# Patient Record
Sex: FEMALE | Race: WHITE | Employment: UNEMPLOYED | URBAN - METROPOLITAN AREA
[De-identification: names, ages, dates, MRNs, and addresses within clinical notes are randomized per-mention and may not be internally consistent; named-entity substitution may affect disease eponyms.]

---

## 2017-01-05 DIAGNOSIS — E11.21 TYPE 2 DIABETES MELLITUS WITH DIABETIC NEPHROPATHY, WITH LONG-TERM CURRENT USE OF INSULIN (HCC): ICD-10-CM

## 2017-01-05 DIAGNOSIS — Z79.4 TYPE 2 DIABETES MELLITUS WITH DIABETIC NEPHROPATHY, WITH LONG-TERM CURRENT USE OF INSULIN (HCC): ICD-10-CM

## 2017-01-05 RX ORDER — INSULIN LISPRO 100 [IU]/ML
INJECTION, SUSPENSION SUBCUTANEOUS
Qty: 15 ML | Refills: 1 | Status: SHIPPED | OUTPATIENT
Start: 2017-01-05 | End: 2017-01-09 | Stop reason: SDUPTHER

## 2017-01-09 DIAGNOSIS — E11.21 TYPE 2 DIABETES MELLITUS WITH DIABETIC NEPHROPATHY, WITH LONG-TERM CURRENT USE OF INSULIN (HCC): ICD-10-CM

## 2017-01-09 DIAGNOSIS — Z79.4 TYPE 2 DIABETES MELLITUS WITH DIABETIC NEPHROPATHY, WITH LONG-TERM CURRENT USE OF INSULIN (HCC): ICD-10-CM

## 2017-01-09 RX ORDER — INSULIN LISPRO 100 [IU]/ML
INJECTION, SUSPENSION SUBCUTANEOUS
Qty: 30 ML | Refills: 0 | Status: SHIPPED | OUTPATIENT
Start: 2017-01-09 | End: 2017-02-28

## 2017-01-09 RX ORDER — INSULIN LISPRO 100 [IU]/ML
INJECTION, SUSPENSION SUBCUTANEOUS
Qty: 10 PEN | Refills: 1 | Status: SHIPPED | OUTPATIENT
Start: 2017-01-09 | End: 2017-01-09

## 2017-02-27 DIAGNOSIS — Z79.4 TYPE 2 DIABETES MELLITUS WITH DIABETIC NEPHROPATHY, WITH LONG-TERM CURRENT USE OF INSULIN (HCC): ICD-10-CM

## 2017-02-27 DIAGNOSIS — E11.21 TYPE 2 DIABETES MELLITUS WITH DIABETIC NEPHROPATHY, WITH LONG-TERM CURRENT USE OF INSULIN (HCC): ICD-10-CM

## 2017-02-28 RX ORDER — INSULIN LISPRO 100 [IU]/ML
INJECTION, SUSPENSION SUBCUTANEOUS
Qty: 15 ML | Refills: 0 | Status: SHIPPED | OUTPATIENT
Start: 2017-02-28 | End: 2017-03-01

## 2017-03-01 ENCOUNTER — OFFICE VISIT (OUTPATIENT)
Dept: FAMILY MEDICINE CLINIC | Age: 77
End: 2017-03-01

## 2017-03-01 VITALS
RESPIRATION RATE: 16 BRPM | WEIGHT: 194 LBS | DIASTOLIC BLOOD PRESSURE: 68 MMHG | HEIGHT: 62 IN | SYSTOLIC BLOOD PRESSURE: 118 MMHG | BODY MASS INDEX: 35.7 KG/M2 | TEMPERATURE: 98.4 F | HEART RATE: 66 BPM

## 2017-03-01 DIAGNOSIS — E11.42 DIABETIC POLYNEUROPATHY ASSOCIATED WITH TYPE 2 DIABETES MELLITUS (HCC): ICD-10-CM

## 2017-03-01 DIAGNOSIS — E11.21 TYPE 2 DIABETES MELLITUS WITH DIABETIC NEPHROPATHY, WITH LONG-TERM CURRENT USE OF INSULIN (HCC): Primary | ICD-10-CM

## 2017-03-01 DIAGNOSIS — M15.9 GENERALIZED OSTEOARTHRITIS OF MULTIPLE SITES: ICD-10-CM

## 2017-03-01 DIAGNOSIS — F32.5 MAJOR DEPRESSIVE DISORDER WITH SINGLE EPISODE, IN FULL REMISSION (HCC): ICD-10-CM

## 2017-03-01 DIAGNOSIS — Z79.4 TYPE 2 DIABETES MELLITUS WITH DIABETIC NEPHROPATHY, WITH LONG-TERM CURRENT USE OF INSULIN (HCC): Primary | ICD-10-CM

## 2017-03-01 DIAGNOSIS — I10 ESSENTIAL HYPERTENSION: ICD-10-CM

## 2017-03-01 DIAGNOSIS — E78.2 MIXED HYPERLIPIDEMIA: ICD-10-CM

## 2017-03-01 LAB — HBA1C MFR BLD: 8.9 %

## 2017-03-01 PROCEDURE — 99214 OFFICE O/P EST MOD 30 MIN: CPT | Performed by: FAMILY MEDICINE

## 2017-03-01 PROCEDURE — 83036 HEMOGLOBIN GLYCOSYLATED A1C: CPT | Performed by: FAMILY MEDICINE

## 2017-03-01 RX ORDER — FLUOXETINE HYDROCHLORIDE 40 MG/1
40 CAPSULE ORAL DAILY
Qty: 90 CAPSULE | Refills: 1 | Status: SHIPPED | OUTPATIENT
Start: 2017-03-01 | End: 2017-05-08

## 2017-03-21 ENCOUNTER — OFFICE VISIT (OUTPATIENT)
Dept: FAMILY MEDICINE CLINIC | Age: 77
End: 2017-03-21

## 2017-03-21 VITALS
SYSTOLIC BLOOD PRESSURE: 118 MMHG | DIASTOLIC BLOOD PRESSURE: 70 MMHG | BODY MASS INDEX: 36.44 KG/M2 | HEART RATE: 68 BPM | WEIGHT: 198 LBS | RESPIRATION RATE: 16 BRPM | TEMPERATURE: 98.4 F | HEIGHT: 62 IN

## 2017-03-21 DIAGNOSIS — H60.542 ACUTE ECZEMATOID OTITIS EXTERNA OF LEFT EAR: Primary | ICD-10-CM

## 2017-03-21 PROCEDURE — 99213 OFFICE O/P EST LOW 20 MIN: CPT | Performed by: FAMILY MEDICINE

## 2017-04-17 ENCOUNTER — OFFICE VISIT (OUTPATIENT)
Dept: FAMILY MEDICINE CLINIC | Age: 77
End: 2017-04-17

## 2017-04-17 VITALS
WEIGHT: 187 LBS | RESPIRATION RATE: 16 BRPM | HEART RATE: 90 BPM | BODY MASS INDEX: 34.41 KG/M2 | OXYGEN SATURATION: 98 % | SYSTOLIC BLOOD PRESSURE: 118 MMHG | HEIGHT: 62 IN | TEMPERATURE: 98.4 F | DIASTOLIC BLOOD PRESSURE: 70 MMHG

## 2017-04-17 DIAGNOSIS — J20.9 ACUTE BRONCHITIS, UNSPECIFIED ORGANISM: Primary | ICD-10-CM

## 2017-04-17 DIAGNOSIS — Z79.4 TYPE 2 DIABETES MELLITUS WITH DIABETIC NEPHROPATHY, WITH LONG-TERM CURRENT USE OF INSULIN (HCC): ICD-10-CM

## 2017-04-17 DIAGNOSIS — E11.21 TYPE 2 DIABETES MELLITUS WITH DIABETIC NEPHROPATHY, WITH LONG-TERM CURRENT USE OF INSULIN (HCC): ICD-10-CM

## 2017-04-17 DIAGNOSIS — H10.31 ACUTE BACTERIAL CONJUNCTIVITIS OF RIGHT EYE: ICD-10-CM

## 2017-04-17 PROCEDURE — 99214 OFFICE O/P EST MOD 30 MIN: CPT | Performed by: FAMILY MEDICINE

## 2017-04-17 RX ORDER — DEXTROMETHORPHAN HYDROBROMIDE AND PROMETHAZINE HYDROCHLORIDE 15; 6.25 MG/5ML; MG/5ML
5 SYRUP ORAL 4 TIMES DAILY PRN
Qty: 240 ML | Refills: 0 | Status: SHIPPED | OUTPATIENT
Start: 2017-04-17 | End: 2017-04-24

## 2017-04-17 RX ORDER — CEFUROXIME AXETIL 250 MG/1
250 TABLET ORAL 2 TIMES DAILY
Qty: 20 TABLET | Refills: 0 | Status: SHIPPED | OUTPATIENT
Start: 2017-04-17 | End: 2017-04-27

## 2017-04-17 RX ORDER — SULFACETAMIDE SODIUM 100 MG/ML
1 SOLUTION/ DROPS OPHTHALMIC 4 TIMES DAILY
Qty: 1 BOTTLE | Refills: 0 | Status: SHIPPED | OUTPATIENT
Start: 2017-04-17 | End: 2017-04-27

## 2017-05-06 DIAGNOSIS — I10 ESSENTIAL HYPERTENSION: ICD-10-CM

## 2017-05-06 DIAGNOSIS — Z79.4 TYPE 2 DIABETES MELLITUS WITH DIABETIC NEPHROPATHY, WITH LONG-TERM CURRENT USE OF INSULIN (HCC): ICD-10-CM

## 2017-05-06 DIAGNOSIS — E11.21 TYPE 2 DIABETES MELLITUS WITH DIABETIC NEPHROPATHY, WITH LONG-TERM CURRENT USE OF INSULIN (HCC): ICD-10-CM

## 2017-05-06 DIAGNOSIS — F32.5 MAJOR DEPRESSIVE DISORDER WITH SINGLE EPISODE, IN FULL REMISSION (HCC): ICD-10-CM

## 2017-05-08 RX ORDER — QUINAPRIL 40 MG/1
TABLET ORAL
Qty: 90 TABLET | Refills: 0 | Status: SHIPPED | OUTPATIENT
Start: 2017-05-08 | End: 2017-07-19 | Stop reason: SDUPTHER

## 2017-05-08 RX ORDER — FLUOXETINE HYDROCHLORIDE 40 MG/1
CAPSULE ORAL
Qty: 90 CAPSULE | Refills: 0 | Status: SHIPPED | OUTPATIENT
Start: 2017-05-08 | End: 2017-07-19 | Stop reason: SDUPTHER

## 2017-07-19 ENCOUNTER — OFFICE VISIT (OUTPATIENT)
Dept: FAMILY MEDICINE CLINIC | Age: 77
End: 2017-07-19

## 2017-07-19 ENCOUNTER — TELEPHONE (OUTPATIENT)
Dept: FAMILY MEDICINE CLINIC | Age: 77
End: 2017-07-19

## 2017-07-19 VITALS
BODY MASS INDEX: 33.66 KG/M2 | RESPIRATION RATE: 16 BRPM | HEIGHT: 63 IN | TEMPERATURE: 98.4 F | SYSTOLIC BLOOD PRESSURE: 130 MMHG | DIASTOLIC BLOOD PRESSURE: 78 MMHG | WEIGHT: 190 LBS | HEART RATE: 60 BPM

## 2017-07-19 DIAGNOSIS — Z86.73 HISTORY OF CVA (CEREBROVASCULAR ACCIDENT): ICD-10-CM

## 2017-07-19 DIAGNOSIS — N81.10 CYSTOCELE WITHOUT UTERINE PROLAPSE: ICD-10-CM

## 2017-07-19 DIAGNOSIS — I10 ESSENTIAL HYPERTENSION: ICD-10-CM

## 2017-07-19 DIAGNOSIS — I67.9 CEREBRAL VASCULAR DISEASE: ICD-10-CM

## 2017-07-19 DIAGNOSIS — E78.2 MIXED HYPERLIPIDEMIA: ICD-10-CM

## 2017-07-19 DIAGNOSIS — K21.9 GASTROESOPHAGEAL REFLUX DISEASE WITHOUT ESOPHAGITIS: ICD-10-CM

## 2017-07-19 DIAGNOSIS — Z79.4 TYPE 2 DIABETES MELLITUS WITH DIABETIC NEPHROPATHY, WITH LONG-TERM CURRENT USE OF INSULIN (HCC): ICD-10-CM

## 2017-07-19 DIAGNOSIS — I65.21 STENOSIS OF RIGHT CAROTID ARTERY: ICD-10-CM

## 2017-07-19 DIAGNOSIS — Z79.4 TYPE 2 DIABETES MELLITUS WITH DIABETIC NEPHROPATHY, WITH LONG-TERM CURRENT USE OF INSULIN (HCC): Primary | ICD-10-CM

## 2017-07-19 DIAGNOSIS — G63 POLYNEUROPATHY ASSOCIATED WITH UNDERLYING DISEASE (HCC): ICD-10-CM

## 2017-07-19 DIAGNOSIS — E11.21 TYPE 2 DIABETES MELLITUS WITH DIABETIC NEPHROPATHY, WITH LONG-TERM CURRENT USE OF INSULIN (HCC): ICD-10-CM

## 2017-07-19 DIAGNOSIS — E11.21 TYPE 2 DIABETES MELLITUS WITH DIABETIC NEPHROPATHY, WITH LONG-TERM CURRENT USE OF INSULIN (HCC): Primary | ICD-10-CM

## 2017-07-19 DIAGNOSIS — F32.5 MAJOR DEPRESSIVE DISORDER WITH SINGLE EPISODE, IN FULL REMISSION (HCC): ICD-10-CM

## 2017-07-19 LAB
A/G RATIO: 1.8 (ref 1.1–2.2)
ALBUMIN SERPL-MCNC: 4.6 G/DL (ref 3.4–5)
ALP BLD-CCNC: 74 U/L (ref 40–129)
ALT SERPL-CCNC: <5 U/L (ref 10–40)
ANION GAP SERPL CALCULATED.3IONS-SCNC: 15 MMOL/L (ref 3–16)
AST SERPL-CCNC: 12 U/L (ref 15–37)
BASOPHILS ABSOLUTE: 0 K/UL (ref 0–0.2)
BASOPHILS RELATIVE PERCENT: 0.7 %
BILIRUB SERPL-MCNC: 0.5 MG/DL (ref 0–1)
BUN BLDV-MCNC: 28 MG/DL (ref 7–20)
CALCIUM SERPL-MCNC: 10.3 MG/DL (ref 8.3–10.6)
CHLORIDE BLD-SCNC: 105 MMOL/L (ref 99–110)
CHOLESTEROL, TOTAL: 157 MG/DL (ref 0–199)
CO2: 26 MMOL/L (ref 21–32)
CREAT SERPL-MCNC: 1.4 MG/DL (ref 0.6–1.2)
EOSINOPHILS ABSOLUTE: 0.2 K/UL (ref 0–0.6)
EOSINOPHILS RELATIVE PERCENT: 3.2 %
GFR AFRICAN AMERICAN: 44
GFR NON-AFRICAN AMERICAN: 36
GLOBULIN: 2.5 G/DL
GLUCOSE BLD-MCNC: 32 MG/DL (ref 70–99)
HCT VFR BLD CALC: 40.1 % (ref 36–48)
HDLC SERPL-MCNC: 76 MG/DL (ref 40–60)
HEMOGLOBIN: 13.2 G/DL (ref 12–16)
LDL CHOLESTEROL CALCULATED: 61 MG/DL
LYMPHOCYTES ABSOLUTE: 2.2 K/UL (ref 1–5.1)
LYMPHOCYTES RELATIVE PERCENT: 35 %
MCH RBC QN AUTO: 30.7 PG (ref 26–34)
MCHC RBC AUTO-ENTMCNC: 32.9 G/DL (ref 31–36)
MCV RBC AUTO: 93.4 FL (ref 80–100)
MONOCYTES ABSOLUTE: 0.6 K/UL (ref 0–1.3)
MONOCYTES RELATIVE PERCENT: 9.5 %
NEUTROPHILS ABSOLUTE: 3.3 K/UL (ref 1.7–7.7)
NEUTROPHILS RELATIVE PERCENT: 51.6 %
PDW BLD-RTO: 15.3 % (ref 12.4–15.4)
PLATELET # BLD: 232 K/UL (ref 135–450)
PMV BLD AUTO: 10.2 FL (ref 5–10.5)
POTASSIUM SERPL-SCNC: 4.8 MMOL/L (ref 3.5–5.1)
RBC # BLD: 4.3 M/UL (ref 4–5.2)
SODIUM BLD-SCNC: 146 MMOL/L (ref 136–145)
TOTAL PROTEIN: 7.1 G/DL (ref 6.4–8.2)
TRIGL SERPL-MCNC: 99 MG/DL (ref 0–150)
VLDLC SERPL CALC-MCNC: 20 MG/DL
WBC # BLD: 6.3 K/UL (ref 4–11)

## 2017-07-19 PROCEDURE — 99214 OFFICE O/P EST MOD 30 MIN: CPT | Performed by: FAMILY MEDICINE

## 2017-07-19 PROCEDURE — 4010F ACE/ARB THERAPY RXD/TAKEN: CPT | Performed by: FAMILY MEDICINE

## 2017-07-19 RX ORDER — PANTOPRAZOLE SODIUM 40 MG/1
40 TABLET, DELAYED RELEASE ORAL DAILY
Qty: 90 TABLET | Refills: 3 | Status: SHIPPED | OUTPATIENT
Start: 2017-07-19 | End: 2017-10-19 | Stop reason: SDUPTHER

## 2017-07-19 RX ORDER — CARVEDILOL 25 MG/1
25 TABLET ORAL 2 TIMES DAILY WITH MEALS
Qty: 180 TABLET | Refills: 1 | Status: SHIPPED | OUTPATIENT
Start: 2017-07-19 | End: 2017-10-19 | Stop reason: SDUPTHER

## 2017-07-19 RX ORDER — INSULIN ASPART 100 [IU]/ML
40 INJECTION, SUSPENSION SUBCUTANEOUS 2 TIMES DAILY WITH MEALS
Qty: 30 ML | Refills: 3 | Status: SHIPPED | OUTPATIENT
Start: 2017-07-19 | End: 2017-09-12 | Stop reason: SDUPTHER

## 2017-07-19 RX ORDER — AMLODIPINE BESYLATE 5 MG/1
5 TABLET ORAL DAILY
Qty: 90 TABLET | Refills: 1 | Status: SHIPPED | OUTPATIENT
Start: 2017-07-19 | End: 2017-10-19 | Stop reason: SDUPTHER

## 2017-07-19 RX ORDER — HYDROCHLOROTHIAZIDE 25 MG/1
25 TABLET ORAL DAILY
Qty: 90 TABLET | Refills: 1 | Status: SHIPPED | OUTPATIENT
Start: 2017-07-19 | End: 2017-10-19 | Stop reason: SDUPTHER

## 2017-07-19 RX ORDER — FLUOXETINE HYDROCHLORIDE 40 MG/1
CAPSULE ORAL
Qty: 90 CAPSULE | Refills: 0 | Status: SHIPPED | OUTPATIENT
Start: 2017-07-19 | End: 2017-08-08 | Stop reason: SDUPTHER

## 2017-07-19 RX ORDER — BLOOD SUGAR DIAGNOSTIC
1 STRIP MISCELLANEOUS 2 TIMES DAILY
Qty: 200 EACH | Refills: 3 | Status: SHIPPED | OUTPATIENT
Start: 2017-07-19 | End: 2022-05-12

## 2017-07-19 RX ORDER — QUINAPRIL 40 MG/1
TABLET ORAL
Qty: 90 TABLET | Refills: 0 | Status: SHIPPED | OUTPATIENT
Start: 2017-07-19 | End: 2017-10-19 | Stop reason: SDUPTHER

## 2017-07-19 RX ORDER — ATORVASTATIN CALCIUM 40 MG/1
40 TABLET, FILM COATED ORAL DAILY
Qty: 90 TABLET | Refills: 1 | Status: SHIPPED | OUTPATIENT
Start: 2017-07-19 | End: 2017-10-19 | Stop reason: SDUPTHER

## 2017-07-20 LAB
ESTIMATED AVERAGE GLUCOSE: 194.4 MG/DL
HBA1C MFR BLD: 8.4 %

## 2017-08-08 DIAGNOSIS — F32.5 MAJOR DEPRESSIVE DISORDER WITH SINGLE EPISODE, IN FULL REMISSION (HCC): ICD-10-CM

## 2017-08-08 RX ORDER — FLUOXETINE HYDROCHLORIDE 40 MG/1
CAPSULE ORAL
Qty: 90 CAPSULE | Refills: 1 | Status: SHIPPED | OUTPATIENT
Start: 2017-08-08 | End: 2017-10-19 | Stop reason: SDUPTHER

## 2017-08-18 ENCOUNTER — HOSPITAL ENCOUNTER (OUTPATIENT)
Dept: MAMMOGRAPHY | Age: 77
Discharge: HOME | End: 2017-08-18
Attending: FAMILY MEDICINE | Admitting: FAMILY MEDICINE

## 2017-08-18 DIAGNOSIS — G54.0 BRACHIAL PLEXUS DISORDERS: ICD-10-CM

## 2017-08-18 DIAGNOSIS — Z12.31 ENCOUNTER FOR SCREENING MAMMOGRAM FOR BREAST CANCER: ICD-10-CM

## 2017-09-12 ENCOUNTER — TELEPHONE (OUTPATIENT)
Dept: FAMILY MEDICINE CLINIC | Age: 77
End: 2017-09-12

## 2017-09-12 DIAGNOSIS — Z79.4 TYPE 2 DIABETES MELLITUS WITH DIABETIC NEPHROPATHY, WITH LONG-TERM CURRENT USE OF INSULIN (HCC): ICD-10-CM

## 2017-09-12 DIAGNOSIS — E11.21 TYPE 2 DIABETES MELLITUS WITH DIABETIC NEPHROPATHY, WITH LONG-TERM CURRENT USE OF INSULIN (HCC): ICD-10-CM

## 2017-09-13 RX ORDER — INSULIN ASPART 100 [IU]/ML
40 INJECTION, SUSPENSION SUBCUTANEOUS 2 TIMES DAILY WITH MEALS
Qty: 30 ML | Refills: 3 | Status: SHIPPED | OUTPATIENT
Start: 2017-09-13 | End: 2017-10-19 | Stop reason: SDUPTHER

## 2017-10-03 ENCOUNTER — OFFICE VISIT (OUTPATIENT)
Dept: ORTHOPEDIC SURGERY | Age: 77
End: 2017-10-03

## 2017-10-03 VITALS
SYSTOLIC BLOOD PRESSURE: 139 MMHG | DIASTOLIC BLOOD PRESSURE: 67 MMHG | BODY MASS INDEX: 34.55 KG/M2 | WEIGHT: 195 LBS | HEART RATE: 65 BPM | HEIGHT: 63 IN

## 2017-10-03 DIAGNOSIS — M25.562 LEFT KNEE PAIN, UNSPECIFIED CHRONICITY: ICD-10-CM

## 2017-10-03 DIAGNOSIS — M17.12 PRIMARY OSTEOARTHRITIS OF LEFT KNEE: Primary | ICD-10-CM

## 2017-10-03 PROCEDURE — 20610 DRAIN/INJ JOINT/BURSA W/O US: CPT | Performed by: ORTHOPAEDIC SURGERY

## 2017-10-03 PROCEDURE — 99214 OFFICE O/P EST MOD 30 MIN: CPT | Performed by: ORTHOPAEDIC SURGERY

## 2017-10-03 RX ORDER — INSULIN NPH HUM/REG INSULIN HM 70-30/ML
VIAL (ML) SUBCUTANEOUS
COMMUNITY
Start: 2017-09-12 | End: 2017-10-19

## 2017-10-03 NOTE — PROGRESS NOTES
Medication Sig Dispense Refill    insulin aspart protamine-insulin aspart (NOVOLOG MIX 70/30) (70-30) 100 UNIT/ML injection Inject 40 Units into the skin 2 times daily (with meals) 30 mL 3    FLUoxetine (PROZAC) 40 MG capsule TAKE 1 CAPSULE BY MOUTH ONE TIME A DAY  90 capsule 1    Insulin Syringe-Needle U-100 31G X 5/16\" 1 ML MISC 1 each by Does not apply route 2 times daily 200 each 3    amLODIPine (NORVASC) 5 MG tablet Take 1 tablet by mouth daily 90 tablet 1    atorvastatin (LIPITOR) 40 MG tablet Take 1 tablet by mouth daily 90 tablet 1    carvedilol (COREG) 25 MG tablet Take 1 tablet by mouth 2 times daily (with meals) 180 tablet 1    hydrochlorothiazide (HYDRODIURIL) 25 MG tablet Take 1 tablet by mouth daily 90 tablet 1    metFORMIN (GLUCOPHAGE) 1000 MG tablet TAKE 1 TABLET BY MOUTH TWO TIMES A DAY WITH MEALS 180 tablet 0    quinapril (ACCUPRIL) 40 MG tablet TAKE 1 TABLET BY MOUTH ONE TIME A DAY 90 tablet 0    pantoprazole (PROTONIX) 40 MG tablet Take 1 tablet by mouth daily 90 tablet 3    clopidogrel (PLAVIX) 75 MG tablet Take 1 tablet by mouth daily 30 tablet 3    Blood Glucose Monitoring Suppl (ACCU-CHEK WALTER) GREGORIO Test 3 times daily 1 Device 0    glucose blood VI test strips (ACCU-CHEK WALTER PLUS) strip Apply 1 each topically 3 times daily As needed. 300 each 3    glucose blood VI test strips (ACCU-CHEK WALTER) strip 4 times a day 150 each 3    aspirin 325 MG tablet Take 1 tablet by mouth daily. 30 tablet 0     No current facility-administered medications for this visit. No Known Allergies    Review of Systems:  Pertinent items are noted in HPI. Review of systems from Patient History Form dated 10/3/17 and available in the patient's chart under the Media tab.       Physical Examination:     Vital signs:   /67  Pulse 65  Ht 5' 2.5\" (1.588 m)  Wt 195 lb (88.5 kg)  LMP  (LMP Unknown)  BMI 35.1 kg/m2    General:  alert, appears stated age, cooperative and no distress   Gait:

## 2017-10-03 NOTE — MR AVS SNAPSHOT
11/28/2012, 11/14/2011    Pneumococcal 13-valent Conjugate ([id number]) 10/2/2015    Pneumococcal Conjugate 7-valent 4/7/2006    Pneumococcal Polysaccharide ([id number]) 4/7/2006    Tdap (Boostrix, Adacel) 7/15/2010    Zoster 12/12/2016      Preventive Care        Date Due    Yearly Flu Vaccine (1) 9/1/2017    Tetanus Combination Vaccine (2 - Td) 7/15/2020    Cholesterol Screening 7/19/2022            MyChart Signup           [employer] allows you to send messages to your doctor, view your test results, renew your prescriptions, schedule appointments, view visit notes, and more. How Do I Sign Up? 1. In your Internet browser, go to [email or web address]  2. Click on the Sign Up Now link in the Sign In box. You will see the New Member Sign Up page. 3. Enter your [employer] Access Code exactly as it appears below. You will not need to use this code after youve completed the sign-up process. If you do not sign up before the expiration date, you must request a new code. [employer] Access Code: [email or web address]  Expires: 12/2/2017  2:09 PM    4. Enter your Social Security Number ([id number]) and Date of Birth (mm/dd/yyyy) as indicated and click Submit. You will be taken to the next sign-up page. 5. Create a [employer] ID. This will be your [employer] login ID and cannot be changed, so think of one that is secure and easy to remember. 6. Create a [employer] password. You can change your password at any time. 7. Enter your Password Reset Question and Answer. This can be used at a later time if you forget your password. 8. Enter your e-mail address. You will receive e-mail notification when new information is available in [address] 9. Click Sign Up. You can now view your medical record. Additional Information  If you have questions, please contact the physician practice where you receive care. Remember, [employer] is NOT to be used for urgent needs. For medical emergencies, dial 911.

## 2017-10-05 RX ORDER — CLOPIDOGREL BISULFATE 75 MG/1
TABLET ORAL
Qty: 30 TABLET | Refills: 5 | Status: SHIPPED | OUTPATIENT
Start: 2017-10-05 | End: 2018-02-09 | Stop reason: SDUPTHER

## 2017-10-14 DIAGNOSIS — E11.21 TYPE 2 DIABETES MELLITUS WITH DIABETIC NEPHROPATHY, WITH LONG-TERM CURRENT USE OF INSULIN (HCC): Primary | ICD-10-CM

## 2017-10-14 DIAGNOSIS — Z79.4 TYPE 2 DIABETES MELLITUS WITH DIABETIC NEPHROPATHY, WITH LONG-TERM CURRENT USE OF INSULIN (HCC): Primary | ICD-10-CM

## 2017-10-16 RX ORDER — BLOOD-GLUCOSE METER
EACH MISCELLANEOUS
Qty: 1 KIT | Refills: 0 | Status: SHIPPED | OUTPATIENT
Start: 2017-10-16 | End: 2019-10-09 | Stop reason: SDUPTHER

## 2017-10-16 RX ORDER — BLOOD-GLUCOSE METER
EACH MISCELLANEOUS
Qty: 1 KIT | Refills: 0 | Status: SHIPPED | OUTPATIENT
Start: 2017-10-16 | End: 2017-10-16 | Stop reason: SDUPTHER

## 2017-10-17 ENCOUNTER — TELEPHONE (OUTPATIENT)
Dept: FAMILY MEDICINE CLINIC | Age: 77
End: 2017-10-17

## 2017-10-17 DIAGNOSIS — E11.21 TYPE 2 DIABETES MELLITUS WITH DIABETIC NEPHROPATHY, WITH LONG-TERM CURRENT USE OF INSULIN (HCC): ICD-10-CM

## 2017-10-17 DIAGNOSIS — Z79.4 TYPE 2 DIABETES MELLITUS WITH DIABETIC NEPHROPATHY, WITH LONG-TERM CURRENT USE OF INSULIN (HCC): ICD-10-CM

## 2017-10-17 NOTE — TELEPHONE ENCOUNTER
[facility] is calling stating pt needs test strips for the ACCU-CHEK WALTER PLUS     The kit was called in the other day but pt will need more test strips since she test 3xdaily

## 2017-10-19 ENCOUNTER — OFFICE VISIT (OUTPATIENT)
Dept: FAMILY MEDICINE CLINIC | Age: 77
End: 2017-10-19

## 2017-10-19 VITALS
HEART RATE: 58 BPM | HEIGHT: 63 IN | TEMPERATURE: 98.2 F | SYSTOLIC BLOOD PRESSURE: 130 MMHG | WEIGHT: 197 LBS | RESPIRATION RATE: 18 BRPM | BODY MASS INDEX: 34.91 KG/M2 | DIASTOLIC BLOOD PRESSURE: 72 MMHG

## 2017-10-19 DIAGNOSIS — K21.9 GASTROESOPHAGEAL REFLUX DISEASE WITHOUT ESOPHAGITIS: ICD-10-CM

## 2017-10-19 DIAGNOSIS — I10 ESSENTIAL HYPERTENSION: ICD-10-CM

## 2017-10-19 DIAGNOSIS — E11.21 TYPE 2 DIABETES MELLITUS WITH DIABETIC NEPHROPATHY, WITH LONG-TERM CURRENT USE OF INSULIN (HCC): ICD-10-CM

## 2017-10-19 DIAGNOSIS — E11.21 TYPE 2 DIABETES MELLITUS WITH DIABETIC NEPHROPATHY, WITH LONG-TERM CURRENT USE OF INSULIN (HCC): Primary | ICD-10-CM

## 2017-10-19 DIAGNOSIS — Z79.4 TYPE 2 DIABETES MELLITUS WITH DIABETIC NEPHROPATHY, WITH LONG-TERM CURRENT USE OF INSULIN (HCC): Primary | ICD-10-CM

## 2017-10-19 DIAGNOSIS — E78.2 MIXED HYPERLIPIDEMIA: ICD-10-CM

## 2017-10-19 DIAGNOSIS — Z79.4 TYPE 2 DIABETES MELLITUS WITH DIABETIC NEPHROPATHY, WITH LONG-TERM CURRENT USE OF INSULIN (HCC): ICD-10-CM

## 2017-10-19 DIAGNOSIS — F32.5 MAJOR DEPRESSIVE DISORDER WITH SINGLE EPISODE, IN FULL REMISSION (HCC): ICD-10-CM

## 2017-10-19 DIAGNOSIS — Z23 NEEDS FLU SHOT: ICD-10-CM

## 2017-10-19 DIAGNOSIS — E11.42 DIABETIC POLYNEUROPATHY ASSOCIATED WITH TYPE 2 DIABETES MELLITUS (HCC): ICD-10-CM

## 2017-10-19 DIAGNOSIS — R33.9 URINARY RETENTION: ICD-10-CM

## 2017-10-19 LAB
ANION GAP SERPL CALCULATED.3IONS-SCNC: 14 MMOL/L (ref 3–16)
BILIRUBIN, POC: NEGATIVE
BLOOD URINE, POC: ABNORMAL
BUN BLDV-MCNC: 26 MG/DL (ref 7–20)
CALCIUM SERPL-MCNC: 9.6 MG/DL (ref 8.3–10.6)
CHLORIDE BLD-SCNC: 101 MMOL/L (ref 99–110)
CLARITY, POC: ABNORMAL
CO2: 28 MMOL/L (ref 21–32)
COLOR, POC: ABNORMAL
CREAT SERPL-MCNC: 1.2 MG/DL (ref 0.6–1.2)
GFR AFRICAN AMERICAN: 53
GFR NON-AFRICAN AMERICAN: 43
GLUCOSE BLD-MCNC: 202 MG/DL (ref 70–99)
GLUCOSE URINE, POC: NEGATIVE
KETONES, POC: NEGATIVE
LEUKOCYTE EST, POC: ABNORMAL
NITRITE, POC: NEGATIVE
PH, POC: 7
POTASSIUM SERPL-SCNC: 4.5 MMOL/L (ref 3.5–5.1)
PROTEIN, POC: ABNORMAL
SODIUM BLD-SCNC: 143 MMOL/L (ref 136–145)
SPECIFIC GRAVITY, POC: 1.02
UROBILINOGEN, POC: ABNORMAL

## 2017-10-19 PROCEDURE — G8598 ASA/ANTIPLAT THER USED: HCPCS | Performed by: FAMILY MEDICINE

## 2017-10-19 PROCEDURE — G0008 ADMIN INFLUENZA VIRUS VAC: HCPCS | Performed by: FAMILY MEDICINE

## 2017-10-19 PROCEDURE — 1123F ACP DISCUSS/DSCN MKR DOCD: CPT | Performed by: FAMILY MEDICINE

## 2017-10-19 PROCEDURE — 1090F PRES/ABSN URINE INCON ASSESS: CPT | Performed by: FAMILY MEDICINE

## 2017-10-19 PROCEDURE — G8417 CALC BMI ABV UP PARAM F/U: HCPCS | Performed by: FAMILY MEDICINE

## 2017-10-19 PROCEDURE — 1036F TOBACCO NON-USER: CPT | Performed by: FAMILY MEDICINE

## 2017-10-19 PROCEDURE — 90662 IIV NO PRSV INCREASED AG IM: CPT | Performed by: FAMILY MEDICINE

## 2017-10-19 PROCEDURE — G8399 PT W/DXA RESULTS DOCUMENT: HCPCS | Performed by: FAMILY MEDICINE

## 2017-10-19 PROCEDURE — 4040F PNEUMOC VAC/ADMIN/RCVD: CPT | Performed by: FAMILY MEDICINE

## 2017-10-19 PROCEDURE — 81002 URINALYSIS NONAUTO W/O SCOPE: CPT | Performed by: FAMILY MEDICINE

## 2017-10-19 PROCEDURE — 99214 OFFICE O/P EST MOD 30 MIN: CPT | Performed by: FAMILY MEDICINE

## 2017-10-19 PROCEDURE — G8427 DOCREV CUR MEDS BY ELIG CLIN: HCPCS | Performed by: FAMILY MEDICINE

## 2017-10-19 PROCEDURE — G8484 FLU IMMUNIZE NO ADMIN: HCPCS | Performed by: FAMILY MEDICINE

## 2017-10-19 RX ORDER — PANTOPRAZOLE SODIUM 40 MG/1
40 TABLET, DELAYED RELEASE ORAL DAILY
Qty: 90 TABLET | Refills: 3 | Status: SHIPPED | OUTPATIENT
Start: 2017-10-19 | End: 2018-02-09 | Stop reason: SDUPTHER

## 2017-10-19 RX ORDER — ATORVASTATIN CALCIUM 40 MG/1
40 TABLET, FILM COATED ORAL DAILY
Qty: 90 TABLET | Refills: 1 | Status: SHIPPED | OUTPATIENT
Start: 2017-10-19 | End: 2018-02-09 | Stop reason: SDUPTHER

## 2017-10-19 RX ORDER — AMLODIPINE BESYLATE 5 MG/1
5 TABLET ORAL DAILY
Qty: 90 TABLET | Refills: 1 | Status: SHIPPED | OUTPATIENT
Start: 2017-10-19 | End: 2018-02-09 | Stop reason: SDUPTHER

## 2017-10-19 RX ORDER — INSULIN ASPART 100 [IU]/ML
INJECTION, SUSPENSION SUBCUTANEOUS
Qty: 30 ML | Refills: 3 | Status: SHIPPED | OUTPATIENT
Start: 2017-10-19 | End: 2017-10-24 | Stop reason: SDUPTHER

## 2017-10-19 RX ORDER — FLUOXETINE HYDROCHLORIDE 40 MG/1
CAPSULE ORAL
Qty: 90 CAPSULE | Refills: 1 | Status: SHIPPED | OUTPATIENT
Start: 2017-10-19 | End: 2018-02-09 | Stop reason: SDUPTHER

## 2017-10-19 RX ORDER — CARVEDILOL 25 MG/1
25 TABLET ORAL 2 TIMES DAILY WITH MEALS
Qty: 180 TABLET | Refills: 1 | Status: SHIPPED | OUTPATIENT
Start: 2017-10-19 | End: 2018-02-09 | Stop reason: SDUPTHER

## 2017-10-19 RX ORDER — HYDROCHLOROTHIAZIDE 25 MG/1
25 TABLET ORAL DAILY
Qty: 90 TABLET | Refills: 1 | Status: SHIPPED | OUTPATIENT
Start: 2017-10-19 | End: 2018-02-09 | Stop reason: SDUPTHER

## 2017-10-19 RX ORDER — QUINAPRIL 40 MG/1
TABLET ORAL
Qty: 90 TABLET | Refills: 0 | Status: SHIPPED | OUTPATIENT
Start: 2017-10-19 | End: 2017-11-06 | Stop reason: SDUPTHER

## 2017-10-19 NOTE — PROGRESS NOTES
Insulin Syringe-Needle U-100 31G X 5/16\" 1 ML MISC 1 each by Does not apply route 2 times daily 200 each 3    amLODIPine (NORVASC) 5 MG tablet Take 1 tablet by mouth daily 90 tablet 1    atorvastatin (LIPITOR) 40 MG tablet Take 1 tablet by mouth daily 90 tablet 1    carvedilol (COREG) 25 MG tablet Take 1 tablet by mouth 2 times daily (with meals) 180 tablet 1    hydrochlorothiazide (HYDRODIURIL) 25 MG tablet Take 1 tablet by mouth daily 90 tablet 1    metFORMIN (GLUCOPHAGE) 1000 MG tablet TAKE 1 TABLET BY MOUTH TWO TIMES A DAY WITH MEALS 180 tablet 0    quinapril (ACCUPRIL) 40 MG tablet TAKE 1 TABLET BY MOUTH ONE TIME A DAY 90 tablet 0    pantoprazole (PROTONIX) 40 MG tablet Take 1 tablet by mouth daily 90 tablet 3    glucose blood VI test strips (ACCU-CHEK WALTER) strip 4 times a day 150 each 3    aspirin 325 MG tablet Take 1 tablet by mouth daily. 30 tablet 0     No current facility-administered medications for this visit.        Cholesterol, Total   Date Value Ref Range Status   07/19/2017 157 0 - 199 mg/dL Final     LDL Calculated   Date Value Ref Range Status   07/19/2017 61 <100 mg/dL Final     HDL   Date Value Ref Range Status   07/19/2017 76 (H) 40 - 60 mg/dL Final   11/14/2011 65 (H) 40 - 60 mg/dl Final     Triglycerides   Date Value Ref Range Status   07/19/2017 99 0 - 150 mg/dL Final     Lab Results   Component Value Date    GLUCOSE 32 (LL) 07/19/2017     Lab Results   Component Value Date     (H) 07/19/2017    K 4.8 07/19/2017    CREATININE 1.4 (H) 07/19/2017     Lab Results   Component Value Date    WBC 6.3 07/19/2017    HGB 13.2 07/19/2017    HCT 40.1 07/19/2017    MCV 93.4 07/19/2017     07/19/2017     Lab Results   Component Value Date    ALT <5 (L) 07/19/2017    AST 12 (L) 07/19/2017    ALKPHOS 74 07/19/2017    BILITOT 0.5 07/19/2017     No results found for: TSH  Lab Results   Component Value Date    LABA1C 8.4 07/19/2017     The 10-year ASCVD risk score (Octavio Howe et al., 2013) is: 45.7%    Values used to calculate the score:      Age: 68 years      Sex: Female      Is Non- : No      Diabetic: Yes      Tobacco smoker: No      Systolic Blood Pressure: 713 mmHg      Is BP treated: Yes      HDL Cholesterol: 76 mg/dL      Total Cholesterol: 157 mg/dL    DIABETES MELLITUS FOLOW-UP  Subjective:      Chief Complaint   Patient presents with    Diabetes     3 month check up      Adelina Pat is an 68 y.o. female who presents for follow up of following chronic problems:   Type 2 diabetes mellitus with diabetic nephropathy, with long-term current use of insulin ([address]     Essential hypertension     Mixed hyperlipidemia     Gastroesophageal reflux disease without esophagitis     Major depressive disorder with single episode, in full remission ([address]     Diabetic polyneuropathy associated with type 2 diabetes mellitus ([facility]      Nocturia 3-4x last few months, BS sometimes 50-60  Denies incontinenece  Feels incomplete void, push to get urine out    · Patient checks sugars 3  time(s) daily. Average: 120. Range: . · Patent follows diabetic diet? Sometimes  · Exercise: walking intermittently  · Taking medicines daily as directed? Yes  · Is the patient reporting any side effects of medications? No  · Patient checks feet daily? Yes  · Tobacco history updated:  reports that she quit smoking about 15 years ago. She has never used smokeless tobacco.  · Blood pressure taken correctly and will be repeated if > 130/80  · See Health maintenance list below for last retinal exam and microalbumin.   · See med list below for diabetes, blood pressure or OTC meds and whether taking aspirin    ROS:  General ROS: negative for - fever or night sweats  Ophthalmic ROS: negative for - blurry vision or decreased vision  Endocrine ROS: negative for - malaise/lethargy or unexpected weight changes  Respiratory ROS: no cough, shortness of breath, or wheezing  Cardiovascular ROS: no

## 2017-10-19 NOTE — PATIENT INSTRUCTIONS
INSTRUCTIONS  NEXT APPOINTMENT: Please schedule annual complete physical (30 minutes) in 3 months. · PLEASE TAKE THIS FORM TO CHECK-OUT WINDOW TO SCHEDULE NEXT VISIT.   · REFILL POLICY:  If not getting refills today, then PLEASE make next appointment on way out today. Will need to see that future appointment scheudled when pharmacy contacts Dr. Clara Linton for a refill. · PLEASE GET BLOODWORK DRAWN TODAY ON FIRST FLOOR in 170. Take orders with you. RESULTS- most blood tests back in couple days. We will call you if any problems. If bloodwork good, you will get letter in mail or notified thru [address] (if signed up) within 2 weeks. If you do not, please call office. · See urology about bladder. Patient Education        Urinary Retention: Care Instructions  Your Care Instructions    Urinary retention means that you aren't able to urinate. In men, it is often caused by a blockage of the urinary tract from an enlarged prostate gland. In men and women, it can also be caused by an infection or nerve damage. Or it may be a side effect of a medicine. The doctor may have drained the urine from your bladder. If you still have problems passing urine, you may need to use a catheter at home. This is used to empty your bladder until the problem can be fixed. Your doctor may put a catheter in your bladder before you go home. If so, he or she will tell you when to come back to have the catheter removed. The doctor has checked you closely. But problems can develop later. If you notice any problems or new symptoms, get medical treatment right away. Follow-up care is a key part of your treatment and safety. Be sure to make and go to all appointments, and call your doctor if you are having problems. It's also a good idea to know your test results and keep a list of the medicines you take. How can you care for yourself at home? Take your medicines exactly as prescribed.  Call your doctor if you think you are having a problem Studies: About These Tests  What are urodynamic studies? Urodynamic studies are a group of tests that show how your body stores and releases urine. The type of test varies from person to person. A simple urodynamic test is done in a doctor's office. Other tests may be done in a hospital or surgery center. Why are these tests done? These tests are done to help find out why a person has symptoms such as:  Leaking urine. Feeling the need to urinate often. Pain when urinating. A weak stream of urine. Frequent urinary tract infections. How can you prepare for the test?  You may be asked to arrive for the test with a full bladder. What happens during the test?  For basic urodynamic testing: You will urinate into a container while the amount of urine and how fast it flows out of the bladder are measured. A thin, flexible tube called a catheter is then inserted into the bladder through the urethra. The urethra is the tube that carries urine from the bladder to the outside of the body. This catheter helps measure how much urine is still in the bladder. You may feel a slight burning sensation when the catheter is inserted. The bladder may be filled with water through the catheter until you have the first urge to urinate. The amount of water in the bladder is measured at this point. Then more water may be added while you resist urinating until you no longer can keep from urinating. The doctor will remove the catheter. What else should you know about the test?  Sometimes X-rays are taken during a test. If they are, your bladder may be filled with fluid that will show up on an X-ray. You may be sore after the test. Soaking in a warm tub may help. How long does the test take? How long the test will take depends on the type of test you have. Ask your doctor how long your specific test or tests should take. What happens after the test?  You will probably be able to go home right away.   You can go back to your

## 2017-10-19 NOTE — PROGRESS NOTES
Vaccine Information Sheet, \"Influenza - Inactivated\"  given to Parag Prudent, or parent/legal guardian of  Parag Prudent and verbalized understanding. Patient responses:    Have you ever had a reaction to a flu vaccine? No  Are you able to eat eggs without adverse effects? No  Do you have any current illness? No  Have you ever had Guillian [place] Syndrome? No    Flu vaccine given per order. Please see immunization tab.

## 2017-10-20 LAB
ESTIMATED AVERAGE GLUCOSE: 197.3 MG/DL
HBA1C MFR BLD: 8.5 %
URINE CULTURE, ROUTINE: NORMAL

## 2017-10-24 DIAGNOSIS — E11.21 TYPE 2 DIABETES MELLITUS WITH DIABETIC NEPHROPATHY, WITH LONG-TERM CURRENT USE OF INSULIN (HCC): ICD-10-CM

## 2017-10-24 DIAGNOSIS — Z79.4 TYPE 2 DIABETES MELLITUS WITH DIABETIC NEPHROPATHY, WITH LONG-TERM CURRENT USE OF INSULIN (HCC): ICD-10-CM

## 2017-10-24 RX ORDER — INSULIN ASPART 100 [IU]/ML
INJECTION, SUSPENSION SUBCUTANEOUS
Qty: 30 ML | Refills: 3 | Status: SHIPPED | OUTPATIENT
Start: 2017-10-24 | End: 2018-02-09 | Stop reason: SDUPTHER

## 2017-10-31 ENCOUNTER — TELEPHONE (OUTPATIENT)
Dept: FAMILY MEDICINE CLINIC | Age: 77
End: 2017-10-31

## 2017-10-31 RX ORDER — INSULIN LISPRO 100 [IU]/ML
INJECTION, SUSPENSION SUBCUTANEOUS
Qty: 5 PEN | Refills: 3 | Status: SHIPPED | OUTPATIENT
Start: 2017-10-31 | End: 2018-02-09

## 2017-10-31 NOTE — TELEPHONE ENCOUNTER
Received fax from [facility] requesting pa on Novolog. This was all ready denied on 9/18/17. Humalog 75/25 is the preferred Formulary.  Can we change to humalog or try another pa on novolog

## 2017-11-06 DIAGNOSIS — I10 ESSENTIAL HYPERTENSION: ICD-10-CM

## 2017-11-06 RX ORDER — QUINAPRIL 40 MG/1
TABLET ORAL
Qty: 90 TABLET | Refills: 0 | Status: SHIPPED | OUTPATIENT
Start: 2017-11-06 | End: 2018-02-09 | Stop reason: SDUPTHER

## 2018-02-04 DIAGNOSIS — E11.21 TYPE 2 DIABETES MELLITUS WITH DIABETIC NEPHROPATHY, WITH LONG-TERM CURRENT USE OF INSULIN (HCC): ICD-10-CM

## 2018-02-04 DIAGNOSIS — Z79.4 TYPE 2 DIABETES MELLITUS WITH DIABETIC NEPHROPATHY, WITH LONG-TERM CURRENT USE OF INSULIN (HCC): ICD-10-CM

## 2018-02-09 ENCOUNTER — OFFICE VISIT (OUTPATIENT)
Dept: FAMILY MEDICINE CLINIC | Age: 78
End: 2018-02-09

## 2018-02-09 VITALS
DIASTOLIC BLOOD PRESSURE: 80 MMHG | BODY MASS INDEX: 35.44 KG/M2 | HEART RATE: 70 BPM | WEIGHT: 200 LBS | SYSTOLIC BLOOD PRESSURE: 128 MMHG | TEMPERATURE: 98.1 F | HEIGHT: 63 IN | RESPIRATION RATE: 16 BRPM

## 2018-02-09 DIAGNOSIS — E11.42 DIABETIC POLYNEUROPATHY ASSOCIATED WITH TYPE 2 DIABETES MELLITUS (HCC): ICD-10-CM

## 2018-02-09 DIAGNOSIS — E78.2 MIXED HYPERLIPIDEMIA: ICD-10-CM

## 2018-02-09 DIAGNOSIS — Z79.4 TYPE 2 DIABETES MELLITUS WITH DIABETIC NEPHROPATHY, WITH LONG-TERM CURRENT USE OF INSULIN (HCC): ICD-10-CM

## 2018-02-09 DIAGNOSIS — K21.9 GASTROESOPHAGEAL REFLUX DISEASE WITHOUT ESOPHAGITIS: ICD-10-CM

## 2018-02-09 DIAGNOSIS — I65.21 STENOSIS OF RIGHT CAROTID ARTERY: ICD-10-CM

## 2018-02-09 DIAGNOSIS — Z86.73 HISTORY OF CVA (CEREBROVASCULAR ACCIDENT): Primary | ICD-10-CM

## 2018-02-09 DIAGNOSIS — F32.0 MAJOR DEPRESSIVE DISORDER, SINGLE EPISODE, MILD (HCC): ICD-10-CM

## 2018-02-09 DIAGNOSIS — I10 ESSENTIAL HYPERTENSION: ICD-10-CM

## 2018-02-09 DIAGNOSIS — I67.9 CEREBRAL VASCULAR DISEASE: ICD-10-CM

## 2018-02-09 DIAGNOSIS — E11.21 TYPE 2 DIABETES MELLITUS WITH DIABETIC NEPHROPATHY, WITH LONG-TERM CURRENT USE OF INSULIN (HCC): ICD-10-CM

## 2018-02-09 DIAGNOSIS — F32.5 MAJOR DEPRESSIVE DISORDER WITH SINGLE EPISODE, IN FULL REMISSION (HCC): ICD-10-CM

## 2018-02-09 LAB — HBA1C MFR BLD: 8.2 %

## 2018-02-09 PROCEDURE — G8484 FLU IMMUNIZE NO ADMIN: HCPCS | Performed by: FAMILY MEDICINE

## 2018-02-09 PROCEDURE — G8598 ASA/ANTIPLAT THER USED: HCPCS | Performed by: FAMILY MEDICINE

## 2018-02-09 PROCEDURE — 4040F PNEUMOC VAC/ADMIN/RCVD: CPT | Performed by: FAMILY MEDICINE

## 2018-02-09 PROCEDURE — G8427 DOCREV CUR MEDS BY ELIG CLIN: HCPCS | Performed by: FAMILY MEDICINE

## 2018-02-09 PROCEDURE — G8417 CALC BMI ABV UP PARAM F/U: HCPCS | Performed by: FAMILY MEDICINE

## 2018-02-09 PROCEDURE — 1036F TOBACCO NON-USER: CPT | Performed by: FAMILY MEDICINE

## 2018-02-09 PROCEDURE — 1123F ACP DISCUSS/DSCN MKR DOCD: CPT | Performed by: FAMILY MEDICINE

## 2018-02-09 PROCEDURE — 99214 OFFICE O/P EST MOD 30 MIN: CPT | Performed by: FAMILY MEDICINE

## 2018-02-09 PROCEDURE — 1090F PRES/ABSN URINE INCON ASSESS: CPT | Performed by: FAMILY MEDICINE

## 2018-02-09 PROCEDURE — 83036 HEMOGLOBIN GLYCOSYLATED A1C: CPT | Performed by: FAMILY MEDICINE

## 2018-02-09 PROCEDURE — G8399 PT W/DXA RESULTS DOCUMENT: HCPCS | Performed by: FAMILY MEDICINE

## 2018-02-09 RX ORDER — INSULIN ASPART 100 [IU]/ML
INJECTION, SUSPENSION SUBCUTANEOUS
Qty: 30 ML | Refills: 1 | Status: SHIPPED | OUTPATIENT
Start: 2018-02-09 | End: 2018-02-09 | Stop reason: SDUPTHER

## 2018-02-09 RX ORDER — PANTOPRAZOLE SODIUM 40 MG/1
40 TABLET, DELAYED RELEASE ORAL DAILY
Qty: 90 TABLET | Refills: 3 | Status: SHIPPED | OUTPATIENT
Start: 2018-02-09 | End: 2018-11-03 | Stop reason: SDUPTHER

## 2018-02-09 RX ORDER — FLUOXETINE HYDROCHLORIDE 40 MG/1
CAPSULE ORAL
Qty: 90 CAPSULE | Refills: 1 | Status: SHIPPED | OUTPATIENT
Start: 2018-02-09 | End: 2018-06-10 | Stop reason: SDUPTHER

## 2018-02-09 RX ORDER — AMLODIPINE BESYLATE 5 MG/1
5 TABLET ORAL DAILY
Qty: 90 TABLET | Refills: 1 | Status: SHIPPED | OUTPATIENT
Start: 2018-02-09 | End: 2018-06-10 | Stop reason: SDUPTHER

## 2018-02-09 RX ORDER — CLOPIDOGREL BISULFATE 75 MG/1
TABLET ORAL
Qty: 30 TABLET | Refills: 5 | Status: SHIPPED | OUTPATIENT
Start: 2018-02-09 | End: 2018-07-12 | Stop reason: SDUPTHER

## 2018-02-09 RX ORDER — INSULIN ASPART 100 [IU]/ML
INJECTION, SUSPENSION SUBCUTANEOUS
Qty: 30 ML | Refills: 1 | Status: ON HOLD | OUTPATIENT
Start: 2018-02-09 | End: 2018-08-20

## 2018-02-09 RX ORDER — CARVEDILOL 25 MG/1
25 TABLET ORAL 2 TIMES DAILY WITH MEALS
Qty: 180 TABLET | Refills: 1 | Status: SHIPPED | OUTPATIENT
Start: 2018-02-09 | End: 2018-06-10 | Stop reason: SDUPTHER

## 2018-02-09 RX ORDER — HYDROCHLOROTHIAZIDE 25 MG/1
25 TABLET ORAL DAILY
Qty: 90 TABLET | Refills: 1 | Status: SHIPPED | OUTPATIENT
Start: 2018-02-09 | End: 2018-06-10 | Stop reason: SDUPTHER

## 2018-02-09 RX ORDER — ATORVASTATIN CALCIUM 40 MG/1
40 TABLET, FILM COATED ORAL DAILY
Qty: 90 TABLET | Refills: 1 | Status: SHIPPED | OUTPATIENT
Start: 2018-02-09 | End: 2018-09-17 | Stop reason: SDUPTHER

## 2018-02-09 RX ORDER — QUINAPRIL 40 MG/1
TABLET ORAL
Qty: 90 TABLET | Refills: 1 | Status: SHIPPED | OUTPATIENT
Start: 2018-02-09 | End: 2018-06-10 | Stop reason: SDUPTHER

## 2018-02-09 RX ORDER — INSULIN LISPRO 100 [IU]/ML
INJECTION, SUSPENSION SUBCUTANEOUS
Qty: 15 PEN | Refills: 1 | Status: CANCELLED | OUTPATIENT
Start: 2018-02-09

## 2018-02-09 ASSESSMENT — PATIENT HEALTH QUESTIONNAIRE - PHQ9
SUM OF ALL RESPONSES TO PHQ9 QUESTIONS 1 & 2: 0
SUM OF ALL RESPONSES TO PHQ QUESTIONS 1-9: 0
2. FEELING DOWN, DEPRESSED OR HOPELESS: 0
1. LITTLE INTEREST OR PLEASURE IN DOING THINGS: 0

## 2018-02-09 NOTE — PROGRESS NOTES
DIABETES MELLITUS FOLOW-UP  Scribe documentation   Halina GARCIA am scribing for Trip Rollins MD. Electronically signed by Halina Gonzalez  on 2/9/2018 at 10:58 AM  I, Gillian Boswell,  personally performed the services described in this documentation, as scribed by the user listed above, and it is both accurate and complete. I agree with the Chief Complaint, ROS, and Past Histories independently gathered by the clinical support staff.      CHART REVIEW  Health Maintenance   Topic Date Due    Potassium monitoring  10/19/2018    Creatinine monitoring  10/19/2018    DTaP/Tdap/Td vaccine (2 - Td) 07/15/2020    Lipid screen  07/19/2022    Zostavax vaccine  Completed    DEXA (modify frequency per FRAX score)  Completed    Flu vaccine  Completed    Pneumococcal low/med risk  Completed      Patient Active Problem List   Diagnosis    DM type 2 (diabetes mellitus, type 2) ([address]    Hypertension    Hyperlipidemia    Osteopenia DXA -1.6 (7/10)    Nephropathy, diabetic ([address]    Edema    Generalized osteoarthritis of multiple sites    Screening mammogram, encounter for    Screening for diabetic retinopathy    Colon polyp- last colon 9/10    Needs flu shot    Fecal urgency    GERD (gastroesophageal reflux disease)    Major depressive disorder, single episode, mild (HCC)    Rotator cuff strain    Brachial plexopathy    Carpal tunnel syndrome of right wrist    Incomplete tear of right rotator cuff    Left thyroid nodule by MRI 2016    Diabetic polyneuropathy associated with type 2 diabetes mellitus ([address]    Primary osteoarthritis of right knee    History of CVA (cerebrovascular accident) 2017 by MRI, no Sx    Cerebral vascular disease    Stenosis of right carotid artery GERTRUDE < 50% (6/2017), repeat 1 year    Urinary retention     Current Outpatient Prescriptions   Medication Sig Dispense Refill    metFORMIN (GLUCOPHAGE) 1000 MG tablet TAKE 1 TABLET BY MOUTH TWO TIMES A DAY WITH MEALS 180 tablet 1  atorvastatin (LIPITOR) 40 MG tablet Take 1 tablet by mouth daily 90 tablet 1    quinapril (ACCUPRIL) 40 MG tablet TAKE 1 TABLET BY MOUTH ONE TIME A DAY 90 tablet 1    insulin aspart protamine-insulin aspart (NOVOLOG MIX 70/30) (70-30) 100 UNIT/ML injection 50 units in AM, 18 units before dinner 30 mL 1    amLODIPine (NORVASC) 5 MG tablet Take 1 tablet by mouth daily 90 tablet 1    carvedilol (COREG) 25 MG tablet Take 1 tablet by mouth 2 times daily (with meals) 180 tablet 1    hydrochlorothiazide (HYDRODIURIL) 25 MG tablet Take 1 tablet by mouth daily 90 tablet 1    pantoprazole (PROTONIX) 40 MG tablet Take 1 tablet by mouth daily 90 tablet 3    FLUoxetine (PROZAC) 40 MG capsule TAKE 1 CAPSULE BY MOUTH ONE TIME A DAY 90 capsule 1    clopidogrel (PLAVIX) 75 MG tablet TAKE 1 TABLET BY MOUTH DAILY 30 tablet 5    glucose blood VI test strips (ACCU-CHEK WALTER PLUS) strip Apply 1 each topically 3 times daily As needed. 300 each 3    Blood Glucose Monitoring Suppl (ACCU-CHEK WALTER PLUS) w/Device KIT USE TO TEST BLOOD SUGAR THREE TIMES DAILY Dx Code: E11.21 1 kit 0    Insulin Syringe-Needle U-100 31G X 5/16\" 1 ML MISC 1 each by Does not apply route 2 times daily 200 each 3    glucose blood VI test strips (ACCU-CHEK WALTER) strip 4 times a day 150 each 3    aspirin 325 MG tablet Take 1 tablet by mouth daily. 30 tablet 0     No current facility-administered medications for this visit.       Cholesterol, Total   Date Value Ref Range Status   07/19/2017 157 0 - 199 mg/dL Final     LDL Calculated   Date Value Ref Range Status   07/19/2017 61 <100 mg/dL Final     HDL   Date Value Ref Range Status   07/19/2017 76 (H) 40 - 60 mg/dL Final   11/14/2011 65 (H) 40 - 60 mg/dl Final     Triglycerides   Date Value Ref Range Status   07/19/2017 99 0 - 150 mg/dL Final     Lab Results   Component Value Date    GLUCOSE 202 (H) 10/19/2017     Lab Results   Component Value Date     10/19/2017    K 4.5 10/19/2017 CREATININE 1.2 10/19/2017     Lab Results   Component Value Date    WBC 6.3 07/19/2017    HGB 13.2 07/19/2017    HCT 40.1 07/19/2017    MCV 93.4 07/19/2017     07/19/2017     Lab Results   Component Value Date    ALT <5 (L) 07/19/2017    AST 12 (L) 07/19/2017    ALKPHOS 74 07/19/2017    BILITOT 0.5 07/19/2017     No results found for: TSH  Lab Results   Component Value Date    LABA1C 8.5 10/19/2017     The 10-year ASCVD risk score (Sharon Allison et al., 2013) is: 44.7%    Values used to calculate the score:      Age: 68 years      Sex: Female      Is Non- : No      Diabetic: Yes      Tobacco smoker: No      Systolic Blood Pressure: 790 mmHg      Is BP treated: Yes      HDL Cholesterol: 76 mg/dL      Total Cholesterol: 157 mg/dL    Subjective:      Chief Complaint   Patient presents with    Diabetes     3 month check up      Kadi Fang is an 68 y.o. female who presents for follow up of following chronic problems:   History of CVA (cerebrovascular accident) 2017 by MRI, no Sx     Type 2 diabetes mellitus with diabetic nephropathy, with long-term current use of insulin (HCC)     Mixed hyperlipidemia     Essential hypertension     Gastroesophageal reflux disease without esophagitis     Major depressive disorder with single episode, in full remission ([address]     Stenosis of right carotid artery GERTRUDE < 50% (6/2017), repeat 1 year     Major depressive disorder, single episode, mild (HCC)     Diabetic polyneuropathy associated with type 2 diabetes mellitus ([address]     Cerebral vascular disease      · Patient checks sugars 2  time(s) daily. Average: 100. Range: . · About 2 low sugars per week, awakens her at night  · Patent follows diabetic diet? Sometimes  · Exercise: walking intermittently  · Taking medicines daily as directed? Yes  · Is the patient reporting any side effects of medications? No  · Patient checks feet daily?    Yes  · Is aspirin on med list? Yes  · Tobacco history impulse normal  · LEGS:  Lower extremity edema:1 + ankle    · Foot exam reveals normal cap refill  SKIN: warm and dry  PSYCH:    · Alert and oriented  · Normal reasoning, insight good  · Facial expressions full, mood appropriate  · No memory disturbance noted     Assessment and Plan:     1. History of CVA (cerebrovascular accident) 2017 by MRI, no Sx     2. Type 2 diabetes mellitus with diabetic nephropathy, with long-term current use of insulin (HCC)  metFORMIN (GLUCOPHAGE) 1000 MG tablet    insulin aspart protamine-insulin aspart (NOVOLOG MIX 70/30) (70-30) 100 UNIT/ML injection    POCT glycosylated hemoglobin (Hb A1C)   3. Mixed hyperlipidemia  atorvastatin (LIPITOR) 40 MG tablet   4. Essential hypertension  quinapril (ACCUPRIL) 40 MG tablet    amLODIPine (NORVASC) 5 MG tablet    carvedilol (COREG) 25 MG tablet    hydrochlorothiazide (HYDRODIURIL) 25 MG tablet   5. Gastroesophageal reflux disease without esophagitis  pantoprazole (PROTONIX) 40 MG tablet   6. Major depressive disorder with single episode, in full remission (HCC)  FLUoxetine (PROZAC) 40 MG capsule   7. Stenosis of right carotid artery GERTRUDE < 50% (6/2017), repeat 1 year  clopidogrel (PLAVIX) 75 MG tablet   8. Major depressive disorder, single episode, mild (HCC)  clopidogrel (PLAVIX) 75 MG tablet   9. Diabetic polyneuropathy associated with type 2 diabetes mellitus ([facility]     10. Cerebral vascular disease  clopidogrel (PLAVIX) 75 MG tablet     RISK FACTORS AND COUNSELLING  · Behavioral Risks- Inadequate physical activity. Counseling provided on the following healthy behaviors: exercise. INSTRUCTIONS  NEXT APPOINTMENT: Please schedule annual complete physical (30 minutes) in 3 months. · PLEASE TAKE THIS FORM TO CHECK-OUT WINDOW TO SCHEDULE NEXT VISIT.   · REFILL POLICY:  If not getting refills today, then PLEASE make next appointment on way out today.   Will need to see that future appointment scheudled when pharmacy contacts Dr. Max Childs for a

## 2018-07-12 DIAGNOSIS — I65.21 STENOSIS OF RIGHT CAROTID ARTERY: ICD-10-CM

## 2018-07-12 DIAGNOSIS — F32.0 MAJOR DEPRESSIVE DISORDER, SINGLE EPISODE, MILD (HCC): ICD-10-CM

## 2018-07-12 DIAGNOSIS — I67.9 CEREBRAL VASCULAR DISEASE: ICD-10-CM

## 2018-07-12 RX ORDER — CLOPIDOGREL BISULFATE 75 MG/1
TABLET ORAL
Qty: 90 TABLET | Refills: 1 | Status: SHIPPED | OUTPATIENT
Start: 2018-07-12 | End: 2018-11-03 | Stop reason: SDUPTHER

## 2018-08-20 PROBLEM — E16.2 HYPOGLYCEMIA: Status: ACTIVE | Noted: 2018-08-20

## 2018-08-22 ENCOUNTER — TELEPHONE (OUTPATIENT)
Dept: FAMILY MEDICINE CLINIC | Age: 78
End: 2018-08-22

## 2018-08-22 NOTE — TELEPHONE ENCOUNTER
[facility] called. ...   Pt was in the hospital yesterday for DM  Her numbers were in the 300  The hospital prescribed her Novolog Mix 70/30  The pharm called the pt stating that there was an issue with this insulin  Please advise

## 2018-08-24 ENCOUNTER — OFFICE VISIT (OUTPATIENT)
Dept: FAMILY MEDICINE CLINIC | Age: 78
End: 2018-08-24

## 2018-08-24 VITALS
WEIGHT: 191 LBS | SYSTOLIC BLOOD PRESSURE: 138 MMHG | DIASTOLIC BLOOD PRESSURE: 78 MMHG | RESPIRATION RATE: 18 BRPM | HEART RATE: 78 BPM | BODY MASS INDEX: 35.15 KG/M2 | TEMPERATURE: 98.5 F | HEIGHT: 62 IN

## 2018-08-24 DIAGNOSIS — E11.21 TYPE 2 DIABETES MELLITUS WITH DIABETIC NEPHROPATHY, WITH LONG-TERM CURRENT USE OF INSULIN (HCC): ICD-10-CM

## 2018-08-24 DIAGNOSIS — N28.9 ACUTE RENAL INSUFFICIENCY: ICD-10-CM

## 2018-08-24 DIAGNOSIS — Z79.4 TYPE 2 DIABETES MELLITUS WITH DIABETIC NEPHROPATHY, WITH LONG-TERM CURRENT USE OF INSULIN (HCC): ICD-10-CM

## 2018-08-24 DIAGNOSIS — Z79.4 TYPE 2 DIABETES MELLITUS WITH DIABETIC NEPHROPATHY, WITH LONG-TERM CURRENT USE OF INSULIN (HCC): Primary | ICD-10-CM

## 2018-08-24 DIAGNOSIS — E11.21 TYPE 2 DIABETES MELLITUS WITH DIABETIC NEPHROPATHY, WITH LONG-TERM CURRENT USE OF INSULIN (HCC): Primary | ICD-10-CM

## 2018-08-24 LAB
ANION GAP SERPL CALCULATED.3IONS-SCNC: 20 MMOL/L (ref 3–16)
BUN BLDV-MCNC: 21 MG/DL (ref 7–20)
CALCIUM SERPL-MCNC: 8.8 MG/DL (ref 8.3–10.6)
CHLORIDE BLD-SCNC: 103 MMOL/L (ref 99–110)
CO2: 18 MMOL/L (ref 21–32)
CREAT SERPL-MCNC: 1.5 MG/DL (ref 0.6–1.2)
GFR AFRICAN AMERICAN: 41
GFR NON-AFRICAN AMERICAN: 34
GLUCOSE BLD-MCNC: 287 MG/DL (ref 70–99)
POTASSIUM SERPL-SCNC: 4.2 MMOL/L (ref 3.5–5.1)
SODIUM BLD-SCNC: 141 MMOL/L (ref 136–145)

## 2018-08-24 PROCEDURE — 99495 TRANSJ CARE MGMT MOD F2F 14D: CPT | Performed by: FAMILY MEDICINE

## 2018-08-24 RX ORDER — INSULIN LISPRO 100 [IU]/ML
INJECTION, SUSPENSION SUBCUTANEOUS
Qty: 5 PEN | Refills: 3 | Status: SHIPPED | OUTPATIENT
Start: 2018-08-24 | End: 2019-03-04 | Stop reason: SDUPTHER

## 2018-08-24 NOTE — PROGRESS NOTES
MD   clopidogrel (PLAVIX) 75 MG tablet TAKE 1 TABLET BY MOUTH  DAILY Yes Brien Franklin MD   quinapril (ACCUPRIL) 40 MG tablet TAKE 1 TABLET BY MOUTH ONCE A DAY Yes Brien Franklin MD   carvedilol (COREG) 25 MG tablet TAKE 1 TABLET BY MOUTH TWO  TIMES DAILY WITH MEALS Yes Brien Franklin MD   amLODIPine (NORVASC) 5 MG tablet TAKE 1 TABLET BY MOUTH  DAILY Yes Brien Franklin MD   FLUoxetine (PROZAC) 40 MG capsule TAKE 1 CAPSULE BY MOUTH  ONCE A DAY Yes Brien Franklin MD   metFORMIN (GLUCOPHAGE) 1000 MG tablet TAKE 1 TABLET BY MOUTH TWO TIMES A DAY WITH MEALS Yes Brien Franklin MD   atorvastatin (LIPITOR) 40 MG tablet Take 1 tablet by mouth daily Yes Brien Franklin MD   pantoprazole (PROTONIX) 40 MG tablet Take 1 tablet by mouth daily Yes Brien Franklin MD   glucose blood VI test strips (ACCU-CHEK WALTER PLUS) strip Apply 1 each topically 3 times daily As needed. Yes Brien Franklin MD   Blood Glucose Monitoring Suppl (ACCU-CHEK WALTER PLUS) w/Device KIT USE TO TEST BLOOD SUGAR THREE TIMES DAILY Dx Code: E11.21 Yes MEY Ko CNP   Insulin Syringe-Needle U-100 31G X 5/16\" 1 ML MISC 1 each by Does not apply route 2 times daily Yes Brien Franklin MD   aspirin 325 MG tablet Take 1 tablet by mouth daily.  Yes MEY Bynum CNP       Patient Active Problem List   Diagnosis    DM type 2 (diabetes mellitus, type 2) ([facility]    Hypertension    Hyperlipidemia    Osteopenia DXA -1.6 (7/10)    Nephropathy, diabetic ([facility]    Edema    Generalized osteoarthritis of multiple sites    Screening mammogram, encounter for    Screening for diabetic retinopathy    Colon polyp- last colon 9/10    Needs flu shot    Fecal urgency    GERD (gastroesophageal reflux disease)    Major depressive disorder, single episode, mild (HCC)    Rotator cuff strain    Brachial plexopathy    Carpal tunnel syndrome of right wrist    Incomplete tear of right rotator cuff    Left thyroid nodule by MRI 2016    Diabetic polyneuropathy associated with type 2 diabetes mellitus ([facility]    Primary osteoarthritis of right knee    History of CVA (cerebrovascular accident) 2017 by MRI, no Sx    Cerebral vascular disease    Stenosis of right carotid artery GERTRUDE < 50% (6/2017), repeat 1 year    Urinary retention    Hypoglycemia      Cholesterol, Total   Date Value Ref Range Status   07/19/2017 157 0 - 199 mg/dL Final     LDL Calculated   Date Value Ref Range Status   07/19/2017 61 <100 mg/dL Final     HDL   Date Value Ref Range Status   07/19/2017 76 (H) 40 - 60 mg/dL Final   11/14/2011 65 (H) 40 - 60 mg/dl Final     Triglycerides   Date Value Ref Range Status   07/19/2017 99 0 - 150 mg/dL Final     Lab Results   Component Value Date    GLUCOSE 206 (H) 08/22/2018     Lab Results   Component Value Date     08/22/2018    K 3.2 (L) 08/22/2018    CREATININE 1.5 (H) 08/22/2018     Lab Results   Component Value Date    WBC 6.3 08/22/2018    HGB 11.4 (L) 08/22/2018    HCT 35.1 (L) 08/22/2018    MCV 93.7 08/22/2018     08/22/2018     Lab Results   Component Value Date    ALT <5 (L) 08/22/2018    AST 8 (L) 08/22/2018    ALKPHOS 75 08/22/2018    BILITOT 0.3 08/22/2018     No results found for: TSH  Lab Results   Component Value Date    LABA1C 6.7 08/20/2018     Flora Radharry YOB: 1940  Date of Visit:  8/24/2018    Chief Complaint   Patient presents with    Follow-Up from [facility] Admitted 8/21/18 D/C 8/22/18 Dx: Hypoglyceima/ Acute Kidney Disease DOC: Patient went back to the [address] [place] [address] ED Follow-up     [facility] Admitted/D/C 8/22/2018 Dx: Hyperglycemia DOC: 8/22/2018     Inpatient course: Discharge summary reviewed- see chart.   Diagnosed with: hypoglycemia  Testing done: Labs, Chest X ray , CT Head, EKG,   Treatment: Changed insulin to Novolog 70-30 but insurance will not pay, needs alternative, taken off HTCZ   Current status: Blood Sugars not controlled, discuss getting insulin pump     Insulin reduced to 40 AM History:   Procedure Laterality Date    CARPAL TUNNEL RELEASE  2000    Right     CATARACT REMOVAL  2006    Bilateral Cataracts     Social History     Social History    Marital status:      Spouse name: N/A    Number of children: N/A    Years of education: N/A     Occupational History    Retired      Social History Main Topics    Smoking status: Former Smoker     Packs/day: 0.50     Years: 15.00     Quit date: 1/1/2002    Smokeless tobacco: Never Used      Comment: congrats    Alcohol use No    Drug use: No    Sexual activity: Not on file     Other Topics Concern    Not on file     Social History Narrative    No narrative on file     Family History   Problem Relation Age of Onset    Stroke Father     Mental Illness Brother         schizophrenia   [address] Maintenance   Topic Date Due    Shingles Vaccine (1 of 2 - 2 Dose Series) 02/12/2017    Flu vaccine (1) 09/01/2018    Potassium monitoring  08/22/2019    Creatinine monitoring  08/22/2019    DTaP/Tdap/Td vaccine (2 - Td) 07/15/2020    DEXA (modify frequency per FRAX score)  Completed    Pneumococcal low/med risk  Completed      Lab Results   Component Value Date    WBC 6.3 08/22/2018    HGB 11.4 (L) 08/22/2018    HCT 35.1 (L) 08/22/2018    MCV 93.7 08/22/2018     08/22/2018     Lab Results   Component Value Date     08/22/2018    K 3.2 (L) 08/22/2018     08/22/2018    CO2 19 (L) 08/22/2018    BUN 28 (H) 08/22/2018    CREATININE 1.5 (H) 08/22/2018    GLUCOSE 206 (H) 08/22/2018    CALCIUM 7.7 (L) 08/22/2018    PROT 5.8 (L) 08/22/2018    LABALBU 3.4 08/22/2018    BILITOT 0.3 08/22/2018    ALKPHOS 75 08/22/2018    AST 8 (L) 08/22/2018    ALT <5 (L) 08/22/2018    LABGLOM 34 (A) 08/22/2018    GFRAA 41 (A) 08/22/2018    AGRATIO 1.4 08/22/2018    GLOB 2.4 08/22/2018     Lab Results   Component Value Date    LABA1C 6.7 08/20/2018     Lab Results   Component Value Date    .6

## 2018-08-30 DIAGNOSIS — I10 ESSENTIAL HYPERTENSION: ICD-10-CM

## 2018-08-30 DIAGNOSIS — F32.5 MAJOR DEPRESSIVE DISORDER WITH SINGLE EPISODE, IN FULL REMISSION (HCC): ICD-10-CM

## 2018-08-30 RX ORDER — QUINAPRIL 40 MG/1
TABLET ORAL
Qty: 90 TABLET | Refills: 1 | Status: SHIPPED | OUTPATIENT
Start: 2018-08-30 | End: 2019-02-26 | Stop reason: SDUPTHER

## 2018-08-30 RX ORDER — AMLODIPINE BESYLATE 5 MG/1
5 TABLET ORAL DAILY
Qty: 90 TABLET | Refills: 1 | Status: SHIPPED | OUTPATIENT
Start: 2018-08-30 | End: 2019-02-26 | Stop reason: SDUPTHER

## 2018-08-30 RX ORDER — HYDROCHLOROTHIAZIDE 25 MG/1
TABLET ORAL
Qty: 90 TABLET | Refills: 1 | Status: SHIPPED | OUTPATIENT
Start: 2018-08-30 | End: 2019-06-18 | Stop reason: SDUPTHER

## 2018-08-30 RX ORDER — CARVEDILOL 25 MG/1
TABLET ORAL
Qty: 180 TABLET | Refills: 1 | Status: SHIPPED | OUTPATIENT
Start: 2018-08-30 | End: 2019-02-26 | Stop reason: SDUPTHER

## 2018-08-30 RX ORDER — FLUOXETINE HYDROCHLORIDE 40 MG/1
CAPSULE ORAL
Qty: 90 CAPSULE | Refills: 1 | Status: SHIPPED | OUTPATIENT
Start: 2018-08-30 | End: 2019-02-26 | Stop reason: SDUPTHER

## 2018-09-02 DIAGNOSIS — E11.21 TYPE 2 DIABETES MELLITUS WITH DIABETIC NEPHROPATHY, WITH LONG-TERM CURRENT USE OF INSULIN (HCC): ICD-10-CM

## 2018-09-02 DIAGNOSIS — Z79.4 TYPE 2 DIABETES MELLITUS WITH DIABETIC NEPHROPATHY, WITH LONG-TERM CURRENT USE OF INSULIN (HCC): ICD-10-CM

## 2018-09-15 DIAGNOSIS — E78.2 MIXED HYPERLIPIDEMIA: ICD-10-CM

## 2018-09-17 RX ORDER — ATORVASTATIN CALCIUM 40 MG/1
TABLET, FILM COATED ORAL
Qty: 90 TABLET | Refills: 0 | Status: SHIPPED | OUTPATIENT
Start: 2018-09-17 | End: 2018-10-13

## 2018-11-03 DIAGNOSIS — K21.9 GASTROESOPHAGEAL REFLUX DISEASE WITHOUT ESOPHAGITIS: ICD-10-CM

## 2018-11-03 DIAGNOSIS — F32.0 MAJOR DEPRESSIVE DISORDER, SINGLE EPISODE, MILD (HCC): ICD-10-CM

## 2018-11-03 DIAGNOSIS — I67.9 CEREBRAL VASCULAR DISEASE: ICD-10-CM

## 2018-11-03 DIAGNOSIS — I65.21 STENOSIS OF RIGHT CAROTID ARTERY: ICD-10-CM

## 2018-11-05 RX ORDER — PANTOPRAZOLE SODIUM 40 MG/1
40 TABLET, DELAYED RELEASE ORAL DAILY
Qty: 90 TABLET | Refills: 0 | Status: SHIPPED | OUTPATIENT
Start: 2018-11-05 | End: 2019-02-26 | Stop reason: SDUPTHER

## 2018-11-05 RX ORDER — CLOPIDOGREL BISULFATE 75 MG/1
TABLET ORAL
Qty: 90 TABLET | Refills: 0 | Status: SHIPPED | OUTPATIENT
Start: 2018-11-05 | End: 2019-02-26 | Stop reason: SDUPTHER

## 2018-11-12 ENCOUNTER — NURSE ONLY (OUTPATIENT)
Dept: FAMILY MEDICINE CLINIC | Age: 78
End: 2018-11-12
Payer: MEDICARE

## 2018-11-12 PROCEDURE — G0008 ADMIN INFLUENZA VIRUS VAC: HCPCS | Performed by: FAMILY MEDICINE

## 2018-11-12 PROCEDURE — 90662 IIV NO PRSV INCREASED AG IM: CPT | Performed by: FAMILY MEDICINE

## 2018-12-01 DIAGNOSIS — Z79.4 TYPE 2 DIABETES MELLITUS WITH DIABETIC NEPHROPATHY, WITH LONG-TERM CURRENT USE OF INSULIN (HCC): ICD-10-CM

## 2018-12-01 DIAGNOSIS — E11.21 TYPE 2 DIABETES MELLITUS WITH DIABETIC NEPHROPATHY, WITH LONG-TERM CURRENT USE OF INSULIN (HCC): ICD-10-CM

## 2018-12-05 ENCOUNTER — OFFICE VISIT (OUTPATIENT)
Dept: FAMILY MEDICINE CLINIC | Age: 78
End: 2018-12-05
Payer: MEDICARE

## 2018-12-05 VITALS
HEIGHT: 62 IN | DIASTOLIC BLOOD PRESSURE: 84 MMHG | HEART RATE: 66 BPM | WEIGHT: 190 LBS | TEMPERATURE: 98.5 F | BODY MASS INDEX: 34.96 KG/M2 | RESPIRATION RATE: 18 BRPM | SYSTOLIC BLOOD PRESSURE: 138 MMHG

## 2018-12-05 DIAGNOSIS — R05.8 POST-VIRAL COUGH SYNDROME: ICD-10-CM

## 2018-12-05 DIAGNOSIS — I10 ESSENTIAL HYPERTENSION: ICD-10-CM

## 2018-12-05 DIAGNOSIS — N18.30 CHRONIC RENAL DISEASE, STAGE 3, MODERATELY DECREASED GLOMERULAR FILTRATION RATE (GFR) BETWEEN 30-59 ML/MIN/1.73 SQUARE METER (HCC): ICD-10-CM

## 2018-12-05 DIAGNOSIS — E11.21 TYPE 2 DIABETES MELLITUS WITH DIABETIC NEPHROPATHY, WITH LONG-TERM CURRENT USE OF INSULIN (HCC): Primary | ICD-10-CM

## 2018-12-05 DIAGNOSIS — E78.2 MIXED HYPERLIPIDEMIA: ICD-10-CM

## 2018-12-05 DIAGNOSIS — F32.0 MAJOR DEPRESSIVE DISORDER, SINGLE EPISODE, MILD (HCC): ICD-10-CM

## 2018-12-05 DIAGNOSIS — Z79.4 TYPE 2 DIABETES MELLITUS WITH DIABETIC NEPHROPATHY, WITH LONG-TERM CURRENT USE OF INSULIN (HCC): Primary | ICD-10-CM

## 2018-12-05 DIAGNOSIS — L57.0 ACTINIC KERATOSIS OF LEFT CHEEK: ICD-10-CM

## 2018-12-05 DIAGNOSIS — I65.21 STENOSIS OF RIGHT CAROTID ARTERY: ICD-10-CM

## 2018-12-05 PROBLEM — E16.2 HYPOGLYCEMIA: Status: RESOLVED | Noted: 2018-08-20 | Resolved: 2018-12-05

## 2018-12-05 LAB
A/G RATIO: 1.7 (ref 1.1–2.2)
ALBUMIN SERPL-MCNC: 4.1 G/DL (ref 3.4–5)
ALP BLD-CCNC: 67 U/L (ref 40–129)
ALT SERPL-CCNC: <5 U/L (ref 10–40)
ANION GAP SERPL CALCULATED.3IONS-SCNC: 15 MMOL/L (ref 3–16)
AST SERPL-CCNC: 10 U/L (ref 15–37)
BASOPHILS ABSOLUTE: 0.1 K/UL (ref 0–0.2)
BASOPHILS RELATIVE PERCENT: 0.9 %
BILIRUB SERPL-MCNC: 0.5 MG/DL (ref 0–1)
BUN BLDV-MCNC: 17 MG/DL (ref 7–20)
CALCIUM SERPL-MCNC: 9.3 MG/DL (ref 8.3–10.6)
CHLORIDE BLD-SCNC: 107 MMOL/L (ref 99–110)
CHOLESTEROL, TOTAL: 135 MG/DL (ref 0–199)
CO2: 26 MMOL/L (ref 21–32)
CREAT SERPL-MCNC: 1.2 MG/DL (ref 0.6–1.2)
EOSINOPHILS ABSOLUTE: 0.2 K/UL (ref 0–0.6)
EOSINOPHILS RELATIVE PERCENT: 3 %
GFR AFRICAN AMERICAN: 52
GFR NON-AFRICAN AMERICAN: 43
GLOBULIN: 2.4 G/DL
GLUCOSE BLD-MCNC: 134 MG/DL (ref 70–99)
HBA1C MFR BLD: 7.8 %
HCT VFR BLD CALC: 34.7 % (ref 36–48)
HDLC SERPL-MCNC: 52 MG/DL (ref 40–60)
HEMOGLOBIN: 11.2 G/DL (ref 12–16)
LDL CHOLESTEROL CALCULATED: 65 MG/DL
LYMPHOCYTES ABSOLUTE: 1.8 K/UL (ref 1–5.1)
LYMPHOCYTES RELATIVE PERCENT: 26.4 %
MCH RBC QN AUTO: 29.6 PG (ref 26–34)
MCHC RBC AUTO-ENTMCNC: 32.2 G/DL (ref 31–36)
MCV RBC AUTO: 92 FL (ref 80–100)
MONOCYTES ABSOLUTE: 0.6 K/UL (ref 0–1.3)
MONOCYTES RELATIVE PERCENT: 9.6 %
NEUTROPHILS ABSOLUTE: 4 K/UL (ref 1.7–7.7)
NEUTROPHILS RELATIVE PERCENT: 60.1 %
PDW BLD-RTO: 14.8 % (ref 12.4–15.4)
PLATELET # BLD: 296 K/UL (ref 135–450)
PMV BLD AUTO: 9.8 FL (ref 5–10.5)
POTASSIUM SERPL-SCNC: 4.2 MMOL/L (ref 3.5–5.1)
RBC # BLD: 3.77 M/UL (ref 4–5.2)
SODIUM BLD-SCNC: 148 MMOL/L (ref 136–145)
TOTAL PROTEIN: 6.5 G/DL (ref 6.4–8.2)
TRIGL SERPL-MCNC: 91 MG/DL (ref 0–150)
VLDLC SERPL CALC-MCNC: 18 MG/DL
WBC # BLD: 6.7 K/UL (ref 4–11)

## 2018-12-05 PROCEDURE — 1101F PT FALLS ASSESS-DOCD LE1/YR: CPT | Performed by: FAMILY MEDICINE

## 2018-12-05 PROCEDURE — 83036 HEMOGLOBIN GLYCOSYLATED A1C: CPT | Performed by: FAMILY MEDICINE

## 2018-12-05 PROCEDURE — 4040F PNEUMOC VAC/ADMIN/RCVD: CPT | Performed by: FAMILY MEDICINE

## 2018-12-05 PROCEDURE — 1036F TOBACCO NON-USER: CPT | Performed by: FAMILY MEDICINE

## 2018-12-05 PROCEDURE — G8399 PT W/DXA RESULTS DOCUMENT: HCPCS | Performed by: FAMILY MEDICINE

## 2018-12-05 PROCEDURE — 99214 OFFICE O/P EST MOD 30 MIN: CPT | Performed by: FAMILY MEDICINE

## 2018-12-05 PROCEDURE — 1090F PRES/ABSN URINE INCON ASSESS: CPT | Performed by: FAMILY MEDICINE

## 2018-12-05 PROCEDURE — G8598 ASA/ANTIPLAT THER USED: HCPCS | Performed by: FAMILY MEDICINE

## 2018-12-05 PROCEDURE — 1123F ACP DISCUSS/DSCN MKR DOCD: CPT | Performed by: FAMILY MEDICINE

## 2018-12-05 PROCEDURE — G8417 CALC BMI ABV UP PARAM F/U: HCPCS | Performed by: FAMILY MEDICINE

## 2018-12-05 PROCEDURE — G8482 FLU IMMUNIZE ORDER/ADMIN: HCPCS | Performed by: FAMILY MEDICINE

## 2018-12-05 PROCEDURE — G8427 DOCREV CUR MEDS BY ELIG CLIN: HCPCS | Performed by: FAMILY MEDICINE

## 2018-12-05 NOTE — PROGRESS NOTES
Value Date    ALT <5 (L) 08/22/2018    AST 8 (L) 08/22/2018    ALKPHOS 75 08/22/2018    BILITOT 0.3 08/22/2018     No results found for: TSH  Lab Results   Component Value Date    LABA1C 7.8 12/05/2018    LABA1C 6.7 08/20/2018    LABA1C 8.2 02/09/2018     Lab Results   Component Value Date    LABMICR YES 08/19/2018     The 10-year ASCVD risk score (Lourdes Dias et al., 2013) is: 54.6%    Values used to calculate the score:      Age: 66 years      Sex: Female      Is Non- : No      Diabetic: Yes      Tobacco smoker: No      Systolic Blood Pressure: 444 mmHg      Is BP treated: Yes      HDL Cholesterol: 76 mg/dL      Total Cholesterol: 157 mg/dL  Prior to Visit Medications    Medication Sig Taking?  Authorizing Provider   metFORMIN (GLUCOPHAGE) 1000 MG tablet TAKE 1 TABLET BY MOUTH TWO TIMES A DAY WITH MEALS  Yes Pepe Perla MD   clopidogrel (PLAVIX) 75 MG tablet TAKE 1 TABLET BY MOUTH  DAILY Yes Pepe Perla MD   pantoprazole (PROTONIX) 40 MG tablet TAKE 1 TABLET BY MOUTH  DAILY Yes Pepe Perla MD   atorvastatin (LIPITOR) 40 MG tablet TAKE 1 TABLET BY MOUTH ONE TIME A DAY  Yes Pepe Perla MD   hydrochlorothiazide (HYDRODIURIL) 25 MG tablet TAKE 1 TABLET BY MOUTH  DAILY Yes Pepe Perla MD   carvedilol (COREG) 25 MG tablet TAKE 1 TABLET BY MOUTH TWO  TIMES DAILY WITH MEALS Yes Pepe Perla MD   amLODIPine (NORVASC) 5 MG tablet TAKE 1 TABLET BY MOUTH  DAILY Yes Pepe Perla MD   FLUoxetine (PROZAC) 40 MG capsule TAKE 1 CAPSULE BY MOUTH  ONCE A DAY Yes Pepe Perla MD   quinapril (ACCUPRIL) 40 MG tablet TAKE 1 TABLET BY MOUTH ONCE A DAY Yes Pepe Perla MD   MEIJER PEN NEEDLES 31G X 6 MM MISC USE FOR FLEXPEN AND QUICKPEN FOUR TIMES DAILY  Yes Diana Brown, MEY - CNP   insulin lispro protamine & lispro (HUMALOG MIX 75/25 KWIKPEN) (75-25) 100 UNIT per ML SUPN injection pen 40 units in AM, 0-10 units before dinner Yes Pepe Perla MD   glucose blood VI test strips (ACCU-CHEK WALTER presents with    Diabetes     3 month check up      Eula Wayne is an 66 y.o. female who presents for follow up of following chronic problems:  1. Type 2 diabetes mellitus with diabetic nephropathy, with long-term current use of insulin (HCC) - low sugar about once a week   2. Major depressive disorder, single episode, mild -  stable   3. Essential hypertension - no issues/concerns. 4. Mixed hyperlipidemia - taking meds, no issues. 5. Chronic renal disease, stage 3, moderately decreased glomerular filtration rate (GFR) between 30-59 mL/min/1.73 square meter (HCC)    6. Stenosis of right carotid artery GERTRUDE < 50% (6/2017), repeat 1 year    7. Post-viral cough syndrome      Complaints:   Productive cough with yellow phlegm x 2 weeks, has not tried anything for the cough     Upper Respiratory   Eula Wayne is a 66 y.o. female who presents for evaluation of symptoms of URI. Onset of symptoms was 2 weeks ago. Symptoms include nasal congestion and minimally productive cough and are gradually worsening since that time. Other symptoms: cough lingering  Denies: low grade fever, shortness of breath, sneezing and wheezing. Tried OTC: cold/cough meds     Bumps on left cheek getting bigger over past 2 months  · Patient checks sugars 2-3  time(s) daily. Average: 130. Rare up to 200   · Patent follows diabetic diet? Sometimes  · Exercise: never  never  · Taking medicines daily as directed? Yes  · Is the patient reporting any side effects of medications? No  · Patient checks feet daily? Yes  · Is aspirin on med list? Yes  · Tobacco history updated:  reports that she quit smoking about 16 years ago. She has a 7.50 pack-year smoking history. She has never used smokeless tobacco.    Review of Systems   General ROS: fever? No,    Night sweats? No  Ophthalmic ROS: change in vision? No  Endocrine ROS: fatigue? No   Unexpected weight changes? No  Respiratory ROS: shortness of breath?  No  Cardiovascular ROS:

## 2018-12-10 DIAGNOSIS — E11.21 TYPE 2 DIABETES MELLITUS WITH DIABETIC NEPHROPATHY, WITH LONG-TERM CURRENT USE OF INSULIN (HCC): ICD-10-CM

## 2018-12-10 DIAGNOSIS — Z79.4 TYPE 2 DIABETES MELLITUS WITH DIABETIC NEPHROPATHY, WITH LONG-TERM CURRENT USE OF INSULIN (HCC): ICD-10-CM

## 2018-12-11 RX ORDER — BLOOD SUGAR DIAGNOSTIC
STRIP MISCELLANEOUS
Qty: 200 EACH | Refills: 5 | Status: SHIPPED | OUTPATIENT
Start: 2018-12-11 | End: 2019-06-18 | Stop reason: SDUPTHER

## 2018-12-13 DIAGNOSIS — Z79.4 TYPE 2 DIABETES MELLITUS WITH DIABETIC NEPHROPATHY, WITH LONG-TERM CURRENT USE OF INSULIN (HCC): ICD-10-CM

## 2018-12-13 DIAGNOSIS — E11.21 TYPE 2 DIABETES MELLITUS WITH DIABETIC NEPHROPATHY, WITH LONG-TERM CURRENT USE OF INSULIN (HCC): ICD-10-CM

## 2019-01-09 ENCOUNTER — OFFICE VISIT (OUTPATIENT)
Dept: SURGERY | Age: 79
End: 2019-01-09
Payer: MEDICARE

## 2019-01-09 VITALS
SYSTOLIC BLOOD PRESSURE: 147 MMHG | OXYGEN SATURATION: 99 % | DIASTOLIC BLOOD PRESSURE: 79 MMHG | BODY MASS INDEX: 34.78 KG/M2 | TEMPERATURE: 97.9 F | HEART RATE: 60 BPM | HEIGHT: 62 IN | WEIGHT: 189 LBS

## 2019-01-09 DIAGNOSIS — L57.0 ACTINIC KERATOSIS OF LEFT CHEEK: Primary | ICD-10-CM

## 2019-01-09 PROCEDURE — G8598 ASA/ANTIPLAT THER USED: HCPCS | Performed by: SURGERY

## 2019-01-09 PROCEDURE — G8427 DOCREV CUR MEDS BY ELIG CLIN: HCPCS | Performed by: SURGERY

## 2019-01-09 PROCEDURE — 1123F ACP DISCUSS/DSCN MKR DOCD: CPT | Performed by: SURGERY

## 2019-01-09 PROCEDURE — 1036F TOBACCO NON-USER: CPT | Performed by: SURGERY

## 2019-01-09 PROCEDURE — 1101F PT FALLS ASSESS-DOCD LE1/YR: CPT | Performed by: SURGERY

## 2019-01-09 PROCEDURE — 99203 OFFICE O/P NEW LOW 30 MIN: CPT | Performed by: SURGERY

## 2019-01-09 PROCEDURE — G8417 CALC BMI ABV UP PARAM F/U: HCPCS | Performed by: SURGERY

## 2019-01-09 PROCEDURE — 4040F PNEUMOC VAC/ADMIN/RCVD: CPT | Performed by: SURGERY

## 2019-01-09 PROCEDURE — 1090F PRES/ABSN URINE INCON ASSESS: CPT | Performed by: SURGERY

## 2019-01-09 PROCEDURE — G8399 PT W/DXA RESULTS DOCUMENT: HCPCS | Performed by: SURGERY

## 2019-01-09 PROCEDURE — G8482 FLU IMMUNIZE ORDER/ADMIN: HCPCS | Performed by: SURGERY

## 2019-02-26 DIAGNOSIS — I65.21 STENOSIS OF RIGHT CAROTID ARTERY: ICD-10-CM

## 2019-02-26 DIAGNOSIS — I10 ESSENTIAL HYPERTENSION: ICD-10-CM

## 2019-02-26 DIAGNOSIS — K21.9 GASTROESOPHAGEAL REFLUX DISEASE WITHOUT ESOPHAGITIS: ICD-10-CM

## 2019-02-26 DIAGNOSIS — F32.0 MAJOR DEPRESSIVE DISORDER, SINGLE EPISODE, MILD (HCC): ICD-10-CM

## 2019-02-26 DIAGNOSIS — I67.9 CEREBRAL VASCULAR DISEASE: ICD-10-CM

## 2019-02-26 DIAGNOSIS — F32.5 MAJOR DEPRESSIVE DISORDER WITH SINGLE EPISODE, IN FULL REMISSION (HCC): ICD-10-CM

## 2019-02-26 RX ORDER — FLUOXETINE HYDROCHLORIDE 40 MG/1
CAPSULE ORAL
Qty: 90 CAPSULE | Refills: 0 | Status: SHIPPED | OUTPATIENT
Start: 2019-02-26 | End: 2019-06-18 | Stop reason: SDUPTHER

## 2019-02-26 RX ORDER — AMLODIPINE BESYLATE 5 MG/1
5 TABLET ORAL DAILY
Qty: 90 TABLET | Refills: 0 | Status: SHIPPED | OUTPATIENT
Start: 2019-02-26 | End: 2019-06-18 | Stop reason: SDUPTHER

## 2019-02-26 RX ORDER — PANTOPRAZOLE SODIUM 40 MG/1
40 TABLET, DELAYED RELEASE ORAL DAILY
Qty: 90 TABLET | Refills: 0 | Status: SHIPPED | OUTPATIENT
Start: 2019-02-26 | End: 2019-06-18 | Stop reason: SDUPTHER

## 2019-02-26 RX ORDER — CARVEDILOL 25 MG/1
TABLET ORAL
Qty: 180 TABLET | Refills: 0 | Status: SHIPPED | OUTPATIENT
Start: 2019-02-26 | End: 2019-06-18 | Stop reason: SDUPTHER

## 2019-02-26 RX ORDER — QUINAPRIL 40 MG/1
TABLET ORAL
Qty: 90 TABLET | Refills: 0 | Status: SHIPPED | OUTPATIENT
Start: 2019-02-26 | End: 2019-06-18 | Stop reason: SDUPTHER

## 2019-02-26 RX ORDER — CLOPIDOGREL BISULFATE 75 MG/1
TABLET ORAL
Qty: 90 TABLET | Refills: 0 | Status: SHIPPED | OUTPATIENT
Start: 2019-02-26 | End: 2019-06-18 | Stop reason: SDUPTHER

## 2019-03-04 ENCOUNTER — TELEPHONE (OUTPATIENT)
Dept: FAMILY MEDICINE CLINIC | Age: 79
End: 2019-03-04

## 2019-03-04 DIAGNOSIS — E11.21 TYPE 2 DIABETES MELLITUS WITH DIABETIC NEPHROPATHY, WITH LONG-TERM CURRENT USE OF INSULIN (HCC): ICD-10-CM

## 2019-03-04 DIAGNOSIS — Z79.4 TYPE 2 DIABETES MELLITUS WITH DIABETIC NEPHROPATHY, WITH LONG-TERM CURRENT USE OF INSULIN (HCC): ICD-10-CM

## 2019-03-04 RX ORDER — INSULIN LISPRO 100 [IU]/ML
INJECTION, SUSPENSION SUBCUTANEOUS
Qty: 5 PEN | Refills: 0 | Status: SHIPPED | OUTPATIENT
Start: 2019-03-04 | End: 2019-06-18 | Stop reason: SDUPTHER

## 2019-03-04 NOTE — TELEPHONE ENCOUNTER
1- due for appointment now, please schedule  2- Please notify patient that prescription was e-scribed to [facility]

## 2019-03-13 DIAGNOSIS — E78.2 MIXED HYPERLIPIDEMIA: ICD-10-CM

## 2019-03-13 RX ORDER — ATORVASTATIN CALCIUM 40 MG/1
TABLET, FILM COATED ORAL
Qty: 90 TABLET | Refills: 0 | Status: SHIPPED | OUTPATIENT
Start: 2019-03-13 | End: 2019-06-10 | Stop reason: SDUPTHER

## 2019-06-18 ENCOUNTER — OFFICE VISIT (OUTPATIENT)
Dept: FAMILY MEDICINE CLINIC | Age: 79
End: 2019-06-18
Payer: MEDICARE

## 2019-06-18 VITALS
TEMPERATURE: 98.2 F | HEIGHT: 62 IN | HEART RATE: 68 BPM | BODY MASS INDEX: 33.31 KG/M2 | WEIGHT: 181 LBS | RESPIRATION RATE: 16 BRPM | DIASTOLIC BLOOD PRESSURE: 60 MMHG | SYSTOLIC BLOOD PRESSURE: 120 MMHG

## 2019-06-18 DIAGNOSIS — E11.42 DIABETIC POLYNEUROPATHY ASSOCIATED WITH TYPE 2 DIABETES MELLITUS (HCC): Primary | ICD-10-CM

## 2019-06-18 DIAGNOSIS — F32.0 MAJOR DEPRESSIVE DISORDER, SINGLE EPISODE, MILD (HCC): ICD-10-CM

## 2019-06-18 DIAGNOSIS — R42 VERTIGO: ICD-10-CM

## 2019-06-18 DIAGNOSIS — F32.5 MAJOR DEPRESSIVE DISORDER WITH SINGLE EPISODE, IN FULL REMISSION (HCC): ICD-10-CM

## 2019-06-18 DIAGNOSIS — I67.9 CEREBRAL VASCULAR DISEASE: ICD-10-CM

## 2019-06-18 DIAGNOSIS — I10 ESSENTIAL HYPERTENSION: ICD-10-CM

## 2019-06-18 DIAGNOSIS — N18.30 CHRONIC RENAL DISEASE, STAGE 3, MODERATELY DECREASED GLOMERULAR FILTRATION RATE (GFR) BETWEEN 30-59 ML/MIN/1.73 SQUARE METER (HCC): ICD-10-CM

## 2019-06-18 DIAGNOSIS — E11.21 TYPE 2 DIABETES MELLITUS WITH DIABETIC NEPHROPATHY, WITH LONG-TERM CURRENT USE OF INSULIN (HCC): ICD-10-CM

## 2019-06-18 DIAGNOSIS — E78.2 MIXED HYPERLIPIDEMIA: ICD-10-CM

## 2019-06-18 DIAGNOSIS — Z86.73 HISTORY OF CVA (CEREBROVASCULAR ACCIDENT): ICD-10-CM

## 2019-06-18 DIAGNOSIS — Z79.4 TYPE 2 DIABETES MELLITUS WITH DIABETIC NEPHROPATHY, WITH LONG-TERM CURRENT USE OF INSULIN (HCC): ICD-10-CM

## 2019-06-18 DIAGNOSIS — I65.21 STENOSIS OF RIGHT CAROTID ARTERY: ICD-10-CM

## 2019-06-18 DIAGNOSIS — K21.9 GASTROESOPHAGEAL REFLUX DISEASE WITHOUT ESOPHAGITIS: ICD-10-CM

## 2019-06-18 LAB — HBA1C MFR BLD: 8.8 %

## 2019-06-18 PROCEDURE — G8399 PT W/DXA RESULTS DOCUMENT: HCPCS | Performed by: FAMILY MEDICINE

## 2019-06-18 PROCEDURE — 4040F PNEUMOC VAC/ADMIN/RCVD: CPT | Performed by: FAMILY MEDICINE

## 2019-06-18 PROCEDURE — G8598 ASA/ANTIPLAT THER USED: HCPCS | Performed by: FAMILY MEDICINE

## 2019-06-18 PROCEDURE — 1036F TOBACCO NON-USER: CPT | Performed by: FAMILY MEDICINE

## 2019-06-18 PROCEDURE — 1090F PRES/ABSN URINE INCON ASSESS: CPT | Performed by: FAMILY MEDICINE

## 2019-06-18 PROCEDURE — G8417 CALC BMI ABV UP PARAM F/U: HCPCS | Performed by: FAMILY MEDICINE

## 2019-06-18 PROCEDURE — 1123F ACP DISCUSS/DSCN MKR DOCD: CPT | Performed by: FAMILY MEDICINE

## 2019-06-18 PROCEDURE — 83036 HEMOGLOBIN GLYCOSYLATED A1C: CPT | Performed by: FAMILY MEDICINE

## 2019-06-18 PROCEDURE — G8427 DOCREV CUR MEDS BY ELIG CLIN: HCPCS | Performed by: FAMILY MEDICINE

## 2019-06-18 PROCEDURE — 99214 OFFICE O/P EST MOD 30 MIN: CPT | Performed by: FAMILY MEDICINE

## 2019-06-18 RX ORDER — QUINAPRIL 40 MG/1
TABLET ORAL
Qty: 90 TABLET | Refills: 1 | Status: SHIPPED | OUTPATIENT
Start: 2019-06-18 | End: 2019-11-23 | Stop reason: SDUPTHER

## 2019-06-18 RX ORDER — INSULIN LISPRO 100 [IU]/ML
INJECTION, SUSPENSION SUBCUTANEOUS
Qty: 10 PEN | Refills: 3 | Status: SHIPPED | OUTPATIENT
Start: 2019-06-18 | End: 2019-10-01 | Stop reason: SDUPTHER

## 2019-06-18 RX ORDER — PANTOPRAZOLE SODIUM 40 MG/1
40 TABLET, DELAYED RELEASE ORAL DAILY
Qty: 90 TABLET | Refills: 3 | Status: SHIPPED | OUTPATIENT
Start: 2019-06-18 | End: 2019-12-06 | Stop reason: SDUPTHER

## 2019-06-18 RX ORDER — FLUOXETINE HYDROCHLORIDE 40 MG/1
CAPSULE ORAL
Qty: 90 CAPSULE | Refills: 1 | Status: SHIPPED | OUTPATIENT
Start: 2019-06-18 | End: 2019-12-06 | Stop reason: SDUPTHER

## 2019-06-18 RX ORDER — CARVEDILOL 25 MG/1
TABLET ORAL
Qty: 180 TABLET | Refills: 1 | Status: SHIPPED | OUTPATIENT
Start: 2019-06-18 | End: 2019-12-06 | Stop reason: SDUPTHER

## 2019-06-18 RX ORDER — AMLODIPINE BESYLATE 5 MG/1
5 TABLET ORAL DAILY
Qty: 90 TABLET | Refills: 1 | Status: SHIPPED | OUTPATIENT
Start: 2019-06-18 | End: 2019-12-06 | Stop reason: SDUPTHER

## 2019-06-18 RX ORDER — MECLIZINE HYDROCHLORIDE 25 MG/1
25 TABLET ORAL 3 TIMES DAILY PRN
Qty: 30 TABLET | Refills: 1 | Status: SHIPPED | OUTPATIENT
Start: 2019-06-18 | End: 2019-12-06 | Stop reason: SDUPTHER

## 2019-06-18 RX ORDER — ATORVASTATIN CALCIUM 40 MG/1
TABLET, FILM COATED ORAL
Qty: 90 TABLET | Refills: 1 | Status: SHIPPED | OUTPATIENT
Start: 2019-06-18 | End: 2019-12-06 | Stop reason: SDUPTHER

## 2019-06-18 RX ORDER — INSULIN LISPRO 100 [IU]/ML
INJECTION, SUSPENSION SUBCUTANEOUS
Qty: 10 PEN | Refills: 3 | Status: SHIPPED | OUTPATIENT
Start: 2019-06-18 | End: 2019-06-18 | Stop reason: SDUPTHER

## 2019-06-18 RX ORDER — HYDROCHLOROTHIAZIDE 25 MG/1
TABLET ORAL
Qty: 90 TABLET | Refills: 1 | Status: SHIPPED | OUTPATIENT
Start: 2019-06-18 | End: 2019-12-06 | Stop reason: SDUPTHER

## 2019-06-18 RX ORDER — CLOPIDOGREL BISULFATE 75 MG/1
TABLET ORAL
Qty: 90 TABLET | Refills: 1 | Status: SHIPPED | OUTPATIENT
Start: 2019-06-18 | End: 2019-12-06 | Stop reason: SDUPTHER

## 2019-06-18 NOTE — PATIENT INSTRUCTIONS
INSTRUCTIONS  NEXT APPOINTMENT: Please schedule fasting annual physical (30 minutes) in 3 months. OK to have water and medications (except for diabetes medicines). ·  Please get flu vaccine when available in fall. Can get either at this office or at stores such as [employer] and [address]. · Take meclizine as needed when dizzy. Do the exercises. · Take 30 units in AM, 15 units with lunch, 0-10 units before dinner    Patient Education       BENIGN PAROXYSMAL VERTIGO    Overview   What is benign paroxysmal positional vertigo? Benign paroxysmal positional vertigo (BPPV) is a problem with the nerves and structure of the inner ear that causes you to suddenly feel dizzy. Symptoms   What are the symptoms of BPPV? You might feel like the room is spinning around in circles or that your surroundings are moving. This feeling is called \"vertigo.  BPPV is associated with feelings of vertigo when you move a certain way (such as turning your head, standing up, rolling over in bed or lying down). You might also feel nauseous (sick to your stomach) at the same time. The nausea and dizziness go away in a few seconds. BPPV is bothersome, but it's rarely serious. Causes & Risk Factors   What causes BPPV? Your inner ear contains tiny calcium particles that help you keep your balance. Normally, these particles are distributed evenly in the inner ears 3 canals. When you move your head, the calcium particles stimulate nerve cells inside the canals. The nerve cells then send your brain a signal telling it which direction your head is moving. Sometimes, the particles can break loose and clump together in one of the canals. When this happens, the nerve cells tell your brain that your head has moved more than it actually has. This incorrect signal results in vertigo. BPPV is most often associated with aging, but it can also occur after you hit your head or (in more rare cases) you develop a virus in the inner ear.     Diagnosis & Tests   How is BPPV diagnosed? Your doctor may suspect BPPV if you feel dizzy when you move your head or body in certain ways. Several tests can help your doctor tell if your dizziness is caused by BPPV, such as a magnetic resonance imaging (MRI) scan or an electronystagmography (ENG). Treatment   How is BPPV treated? Your doctor can show you some easy head movements to help move the particles out of the inner ear canals and into areas where they will not cause episodes of vertigo. Doing these movements can stop the symptoms and may keep the dizziness from coming back. Your doctor may also give you medicine to treat the nausea and dizziness. In more severe cases, surgery may be necessary to fix the problem. HOME TREATMENT OF BPPV:  BERNABE-DAROFF EXERCISES       The Bernabe-Daroff Exercises are a home method of treating BPPV, usually used when the side of BPPV is unclear. Their use has been declining in recent years, as the home Epley maneuver (see below) is considerably more effective. They succeed in 95% of cases but are more arduous than the office treatments. These exercises also may take longer than the other maneuvers -- the response rate at one week is only about 25% Jakob et al, 1999). These exercises are performed in three sets per day for two weeks. In each set, one performs the maneuver as shown five times. 1 repetition = maneuver done to each side in turn (takes 2 minutes)   Suggested Schedule for Bernabe-Daroff exercises   Time Exercise Duration   Morning 5 repetitions 10 minutes   Noon 5 repetitions 10 minutes   Evening 5 repetitions 10 minutes   Start sitting upright (position 1). Then move into the side-lying position (position 2), with the head angled upward about assisted. An easy way to remember this is to imagine someone standing about 6 feet in front of you, and just keep looking at their head at all times.  Stay in the side-lying position for 30 seconds, or until the dizziness subsides if this is longer, then go back to the sitting position (position 3). Stay there for 30 seconds, and then go to the opposite side (position 4) and follow the same routine. These exercises should be performed for two weeks, three times per day, or for three weeks, twice per day. This adds up to 42 sets in total. In most persons, complete relief from symptoms is obtained after 30 sets, or about 10 days. In approximately 30 percent of patients, BPPV will recur within one year. HOME EPLEY MANEUVER        The Epley and/or Semont maneuvers as described above can be done at home (Calin et al, 1999; Abbe Bowens and Vic, 2004). We often recommend the home-Epley to our patients who have a clear diagnosis. This procedure seems to be even more effective than the in-office procedure, perhaps because it is repeated every night for a week. The method (for the left side) is performed as shown on the figure to the right. One stays in each of the supine (lying down) positions for 30 seconds, and in the sitting upright position (top) for 1 minute. Thus, once cycle takes 2 1/2 minutes. Typically 3 cycles are performed just prior to going to sleep. It is best to do them at night rather than in the morning or midday, as if one becomes dizzy following the exercises, then it can resolve while one is sleeping. The mirror image of this procedure is used for the right ear.

## 2019-06-18 NOTE — PROGRESS NOTES
DIABETES MELLITUS FOLOW-UP  Subjective:      Chief Complaint   Patient presents with    Diabetes     Norma Alexander is an 78 y.o. female who presents for follow up of following chronic problems:  1. Diabetic polyneuropathy associated with type 2 diabetes mellitus ([facility]    2. Chronic renal disease, stage 3, moderately decreased glomerular filtration rate (GFR) between 30-59 mL/min/1.73 square meter (HCC)    3. Essential hypertension    4. Mixed hyperlipidemia    5. History of CVA (cerebrovascular accident) 2017 by MRI, no Sx    6. Type 2 diabetes mellitus with diabetic nephropathy, with long-term current use of insulin ([address]    7. Major depressive disorder, single episode, mild ([address]    8. Vertigo    9. Major depressive disorder with single episode, in full remission ([facility]    10. Gastroesophageal reflux disease without esophagitis    11. Stenosis of right carotid artery GERTRUDE < 50% (6/2017), repeat 1 year    12. Cerebral vascular disease      Complaints: pt states she has been getting dizzy when she lays down and when she gets up. For less than a minute. No ear pain. · Patient checks sugars 3  time(s) daily. Average: 140. Range: under 200. · Patent follows diabetic diet? Sometimes  · Exercise: sometimes  intermittently  · Taking medicines daily as directed? Yes  · Is the patient reporting any side effects of medications? No  · Patient checks bottom of feet daily? Yes  · Tobacco history updated:  reports that she quit smoking about 17 years ago. She has a 7.50 pack-year smoking history. She has never used smokeless tobacco.    Review of Systems   General ROS: fever? No,   Night sweats? No  Ophthalmic ROS: change in vision? No  Endocrine ROS: fatigue? No  Unexpected weight changes? No  Respiratory ROS: Shortness of breath? No  Cardiovascular ROS: chest pain? No  Gastrointestinal ROS: abdominal pain? No  Change in stools? No  Genito-Urinary ROS: painful urination?   No  Trouble urinating? No  Neurological ROS: TIA or stroke symptoms? No  Numbness/tingling? Yes in fingers and feet, symptoms are at baseline. Dermatological ROS: rash or sores on feet? No  Changes in skin spots? No     * Documentation provided by medical assistant reviewed and updated by provider. HISTORY:  Patient's medications, allergies, past medical, and social histories were reviewed and updated as appropriate. CHART REVIEW  Health Maintenance   Topic Date Due    Shingles Vaccine (2 of 3) 06/18/2020 (Originally 2/6/2017)    Potassium monitoring  12/05/2019    Creatinine monitoring  12/05/2019    DTaP/Tdap/Td vaccine (2 - Td) 07/15/2020    DEXA (modify frequency per FRAX score)  Completed    Flu vaccine  Completed    Pneumococcal 65+ years Vaccine  Completed     The 10-year ASCVD risk score (Nicky Fernandez, et al., 2013) is: 47.4%    Values used to calculate the score:      Age: 78 years      Sex: Female      Is Non- : No      Diabetic: Yes      Tobacco smoker: No      Systolic Blood Pressure: 551 mmHg      Is BP treated: Yes      HDL Cholesterol: 52 mg/dL      Total Cholesterol: 135 mg/dL  Prior to Visit Medications    Medication Sig Taking?  Authorizing Provider   meclizine (ANTIVERT) 25 MG tablet Take 1 tablet by mouth 3 times daily as needed for Dizziness or Nausea Yes Jade Burns MD   atorvastatin (LIPITOR) 40 MG tablet TAKE 1 TABLET BY MOUTH ONE TIME A DAY Yes Jade Burns MD   metFORMIN (GLUCOPHAGE) 1000 MG tablet TAKE 1 TABLET BY MOUTH TWO TIMES A DAY WITH MEALS Yes Jade Burns MD   insulin lispro protamine & lispro (HUMALOG MIX 75/25 KWIKPEN) (75-25) 100 UNIT per ML SUPN injection pen 40 units in AM, 0-10 units before dinner Yes Jade Burns MD   FLUoxetine (PROZAC) 40 MG capsule TAKE 1 CAPSULE BY MOUTH  ONCE A DAY Yes Jade Burns MD   amLODIPine (NORVASC) 5 MG tablet Take 1 tablet by mouth daily Yes Jade Burns MD   carvedilol (COREG) 25 MG tablet TAKE 1 TABLET BY MOUTH TWO  TIMES DAILY WITH MEALS Yes Nelida Mesa MD   pantoprazole (PROTONIX) 40 MG tablet Take 1 tablet by mouth daily Yes Nelida Mesa MD   quinapril (ACCUPRIL) 40 MG tablet TAKE 1 TABLET BY MOUTH ONCE A DAY Yes Nelida Mesa MD   clopidogrel (PLAVIX) 75 MG tablet TAKE 1 TABLET BY MOUTH  DAILY Yes Nelida Mesa MD   hydrochlorothiazide (HYDRODIURIL) 25 MG tablet TAKE 1 TABLET BY MOUTH  DAILY Yes Nelida Mesa MD   blood glucose test strips (ACCU-CHEK WALTER PLUS) strip USE THREE TIMES DAILY AS NEEDED Yes Nelida Mesa MD   MEIJER PEN NEEDLES 31G X 6 MM MISC USE FOR FLEXPEN AND QUICKPEN FOUR TIMES DAILY  Yes MEY Magana CNP   Blood Glucose Monitoring Suppl (ACCU-CHEK WALTER PLUS) w/Device KIT USE TO TEST BLOOD SUGAR THREE TIMES DAILY Dx Code: E11.21 Yes MEY Magana CNP   Insulin Syringe-Needle U-100 31G X 5/16\" 1 ML MISC 1 each by Does not apply route 2 times daily Yes Nelida Mesa MD   aspirin 325 MG tablet Take 1 tablet by mouth daily.  Yes MEY Valero CNP      Family History   Problem Relation Age of Onset    Stroke Father     Mental Illness Brother         schizophrenia    Cancer Brother         Lung Cancer     Social History     Tobacco Use    Smoking status: Former Smoker     Packs/day: 0.50     Years: 15.00     Pack years: 7.50     Last attempt to quit: 2002     Years since quittin.4    Smokeless tobacco: Never Used    Tobacco comment: congrats   Substance Use Topics    Alcohol use: No    Drug use: No      LAST LABS  Cholesterol, Total   Date Value Ref Range Status   2018 135 0 - 199 mg/dL Final     LDL Calculated   Date Value Ref Range Status   2018 65 <100 mg/dL Final     HDL   Date Value Ref Range Status   2018 52 40 - 60 mg/dL Final   2011 65 (H) 40 - 60 mg/dl Final     Triglycerides   Date Value Ref Range Status   2018 91 0 - 150 mg/dL Final     Lab Results   Component Value Date    GLUCOSE 134 (H) 12/05/2018     Lab Results   Component Value Date     (H) 12/05/2018    K 4.2 12/05/2018    CREATININE 1.2 12/05/2018     Lab Results   Component Value Date    WBC 6.7 12/05/2018    HGB 11.2 (L) 12/05/2018    HCT 34.7 (L) 12/05/2018    MCV 92.0 12/05/2018     12/05/2018     Lab Results   Component Value Date    ALT <5 (L) 12/05/2018    AST 10 (L) 12/05/2018    ALKPHOS 67 12/05/2018    BILITOT 0.5 12/05/2018     No results found for: TSH  Lab Results   Component Value Date    LABA1C 7.8 12/05/2018 06/21/19   Objective:   PHYSICAL EXAM   /60 (Site: Left Upper Arm, Position: Sitting, Cuff Size: Large Adult)   Pulse 68   Temp 98.2 °F (36.8 °C) (Oral)   Resp 16   Ht 5' 2\" (1.575 m)   Wt 181 lb (82.1 kg)   LMP  (LMP Unknown)   BMI 33.11 kg/m²   BP Readings from Last 5 Encounters:   06/18/19 120/60   01/09/19 (!) 147/79   12/05/18 138/84   08/24/18 138/78   08/22/18 (!) 154/72     Wt Readings from Last 5 Encounters:   06/18/19 181 lb (82.1 kg)   01/09/19 189 lb (85.7 kg)   12/05/18 190 lb (86.2 kg)   08/24/18 191 lb (86.6 kg)   08/20/18 193 lb 4 oz (87.7 kg)      GENERAL:   · well-developed, well-nourished, alert, no distress. EYES:   · External findings: lids and lashes normal and conjunctivae and sclerae normal  LUNGS:    · Breathing unlabored  · clear to auscultation bilaterally and good air movement  CARDIOVASC:   · regular rate and rhythm  · LEGS:  Lower extremity edema: 1 + pitting to ankle    SKIN: warm and dry  PSYCH:    · Alert and oriented  · Normal reasoning, insight good  · Facial expressions full, mood appropriate  · No memory disturbance noted  MUSCULOSKEL:    · No significant finger or nail findings  NEURO:   · CN 2-12 intact     Assessment and Plan:      Diagnosis Orders   1. Diabetic polyneuropathy associated with type 2 diabetes mellitus ([facility]     2.  Chronic renal disease, stage 3, moderately decreased glomerular filtration rate (GFR) between 30-59 mL/min/1.73 square

## 2019-09-30 ENCOUNTER — TELEPHONE (OUTPATIENT)
Dept: FAMILY MEDICINE CLINIC | Age: 79
End: 2019-09-30

## 2019-09-30 DIAGNOSIS — E11.21 TYPE 2 DIABETES MELLITUS WITH DIABETIC NEPHROPATHY, WITH LONG-TERM CURRENT USE OF INSULIN (HCC): ICD-10-CM

## 2019-09-30 DIAGNOSIS — Z79.4 TYPE 2 DIABETES MELLITUS WITH DIABETIC NEPHROPATHY, WITH LONG-TERM CURRENT USE OF INSULIN (HCC): ICD-10-CM

## 2019-10-01 RX ORDER — INSULIN LISPRO 100 [IU]/ML
INJECTION, SUSPENSION SUBCUTANEOUS
Qty: 10 PEN | Refills: 0 | Status: SHIPPED | OUTPATIENT
Start: 2019-10-01 | End: 2019-12-06 | Stop reason: SDUPTHER

## 2019-10-02 ENCOUNTER — TELEPHONE (OUTPATIENT)
Dept: FAMILY MEDICINE CLINIC | Age: 79
End: 2019-10-02

## 2019-10-02 DIAGNOSIS — E11.21 TYPE 2 DIABETES MELLITUS WITH DIABETIC NEPHROPATHY, WITH LONG-TERM CURRENT USE OF INSULIN (HCC): ICD-10-CM

## 2019-10-02 DIAGNOSIS — Z79.4 TYPE 2 DIABETES MELLITUS WITH DIABETIC NEPHROPATHY, WITH LONG-TERM CURRENT USE OF INSULIN (HCC): ICD-10-CM

## 2019-10-08 RX ORDER — BLOOD-GLUCOSE METER
EACH MISCELLANEOUS
Qty: 1 KIT | Refills: 0 | Status: CANCELLED | OUTPATIENT
Start: 2019-10-08

## 2019-10-08 RX ORDER — LANCETS 30 GAUGE
1 EACH MISCELLANEOUS 4 TIMES DAILY
Qty: 400 EACH | Refills: 3 | Status: SHIPPED | OUTPATIENT
Start: 2019-10-08 | End: 2019-12-06 | Stop reason: SDUPTHER

## 2019-10-08 RX ORDER — GLUCOSAMINE HCL/CHONDROITIN SU 500-400 MG
CAPSULE ORAL
Qty: 400 STRIP | Refills: 3 | Status: SHIPPED | OUTPATIENT
Start: 2019-10-08 | End: 2019-12-06 | Stop reason: SDUPTHER

## 2019-10-09 DIAGNOSIS — Z79.4 TYPE 2 DIABETES MELLITUS WITH DIABETIC NEPHROPATHY, WITH LONG-TERM CURRENT USE OF INSULIN (HCC): ICD-10-CM

## 2019-10-09 DIAGNOSIS — E11.21 TYPE 2 DIABETES MELLITUS WITH DIABETIC NEPHROPATHY, WITH LONG-TERM CURRENT USE OF INSULIN (HCC): ICD-10-CM

## 2019-10-09 RX ORDER — BLOOD-GLUCOSE METER
EACH MISCELLANEOUS
Qty: 1 KIT | Refills: 0 | Status: CANCELLED | OUTPATIENT
Start: 2019-10-09

## 2019-10-09 RX ORDER — BLOOD-GLUCOSE METER
EACH MISCELLANEOUS
Qty: 1 KIT | Refills: 0 | Status: SHIPPED | OUTPATIENT
Start: 2019-10-09

## 2019-10-10 ENCOUNTER — TELEPHONE (OUTPATIENT)
Dept: FAMILY MEDICINE CLINIC | Age: 79
End: 2019-10-10

## 2019-10-30 DIAGNOSIS — E11.21 TYPE 2 DIABETES MELLITUS WITH DIABETIC NEPHROPATHY, WITH LONG-TERM CURRENT USE OF INSULIN (HCC): ICD-10-CM

## 2019-10-30 DIAGNOSIS — Z79.4 TYPE 2 DIABETES MELLITUS WITH DIABETIC NEPHROPATHY, WITH LONG-TERM CURRENT USE OF INSULIN (HCC): ICD-10-CM

## 2019-11-21 DIAGNOSIS — I10 ESSENTIAL HYPERTENSION: ICD-10-CM

## 2019-11-23 RX ORDER — QUINAPRIL 40 MG/1
TABLET ORAL
Qty: 90 TABLET | Refills: 0 | Status: SHIPPED | OUTPATIENT
Start: 2019-11-23 | End: 2019-12-06 | Stop reason: SDUPTHER

## 2019-12-06 ENCOUNTER — OFFICE VISIT (OUTPATIENT)
Dept: FAMILY MEDICINE CLINIC | Age: 79
End: 2019-12-06
Payer: MEDICARE

## 2019-12-06 ENCOUNTER — TELEPHONE (OUTPATIENT)
Dept: FAMILY MEDICINE CLINIC | Age: 79
End: 2019-12-06

## 2019-12-06 VITALS
WEIGHT: 190 LBS | HEIGHT: 62 IN | SYSTOLIC BLOOD PRESSURE: 128 MMHG | HEART RATE: 72 BPM | RESPIRATION RATE: 16 BRPM | DIASTOLIC BLOOD PRESSURE: 78 MMHG | BODY MASS INDEX: 34.96 KG/M2

## 2019-12-06 DIAGNOSIS — F32.0 MAJOR DEPRESSIVE DISORDER, SINGLE EPISODE, MILD (HCC): ICD-10-CM

## 2019-12-06 DIAGNOSIS — F32.5 MAJOR DEPRESSIVE DISORDER WITH SINGLE EPISODE, IN FULL REMISSION (HCC): ICD-10-CM

## 2019-12-06 DIAGNOSIS — E78.2 MIXED HYPERLIPIDEMIA: ICD-10-CM

## 2019-12-06 DIAGNOSIS — I65.21 STENOSIS OF RIGHT CAROTID ARTERY: ICD-10-CM

## 2019-12-06 DIAGNOSIS — Z79.4 TYPE 2 DIABETES MELLITUS WITH DIABETIC NEPHROPATHY, WITH LONG-TERM CURRENT USE OF INSULIN (HCC): Primary | ICD-10-CM

## 2019-12-06 DIAGNOSIS — E11.21 TYPE 2 DIABETES MELLITUS WITH DIABETIC NEPHROPATHY, WITH LONG-TERM CURRENT USE OF INSULIN (HCC): ICD-10-CM

## 2019-12-06 DIAGNOSIS — I10 ESSENTIAL HYPERTENSION: ICD-10-CM

## 2019-12-06 DIAGNOSIS — Z79.4 TYPE 2 DIABETES MELLITUS WITH DIABETIC NEPHROPATHY, WITH LONG-TERM CURRENT USE OF INSULIN (HCC): ICD-10-CM

## 2019-12-06 DIAGNOSIS — I67.9 CEREBRAL VASCULAR DISEASE: ICD-10-CM

## 2019-12-06 DIAGNOSIS — E11.21 TYPE 2 DIABETES MELLITUS WITH DIABETIC NEPHROPATHY, WITH LONG-TERM CURRENT USE OF INSULIN (HCC): Primary | ICD-10-CM

## 2019-12-06 DIAGNOSIS — R42 VERTIGO: ICD-10-CM

## 2019-12-06 DIAGNOSIS — K21.9 GASTROESOPHAGEAL REFLUX DISEASE WITHOUT ESOPHAGITIS: ICD-10-CM

## 2019-12-06 DIAGNOSIS — Z23 NEEDS FLU SHOT: ICD-10-CM

## 2019-12-06 LAB — HBA1C MFR BLD: 8.6 %

## 2019-12-06 PROCEDURE — 83036 HEMOGLOBIN GLYCOSYLATED A1C: CPT | Performed by: FAMILY MEDICINE

## 2019-12-06 PROCEDURE — G8482 FLU IMMUNIZE ORDER/ADMIN: HCPCS | Performed by: FAMILY MEDICINE

## 2019-12-06 PROCEDURE — 1123F ACP DISCUSS/DSCN MKR DOCD: CPT | Performed by: FAMILY MEDICINE

## 2019-12-06 PROCEDURE — G8427 DOCREV CUR MEDS BY ELIG CLIN: HCPCS | Performed by: FAMILY MEDICINE

## 2019-12-06 PROCEDURE — 4040F PNEUMOC VAC/ADMIN/RCVD: CPT | Performed by: FAMILY MEDICINE

## 2019-12-06 PROCEDURE — G8399 PT W/DXA RESULTS DOCUMENT: HCPCS | Performed by: FAMILY MEDICINE

## 2019-12-06 PROCEDURE — 1036F TOBACCO NON-USER: CPT | Performed by: FAMILY MEDICINE

## 2019-12-06 PROCEDURE — 1090F PRES/ABSN URINE INCON ASSESS: CPT | Performed by: FAMILY MEDICINE

## 2019-12-06 PROCEDURE — G8598 ASA/ANTIPLAT THER USED: HCPCS | Performed by: FAMILY MEDICINE

## 2019-12-06 PROCEDURE — G8417 CALC BMI ABV UP PARAM F/U: HCPCS | Performed by: FAMILY MEDICINE

## 2019-12-06 PROCEDURE — 90653 IIV ADJUVANT VACCINE IM: CPT | Performed by: FAMILY MEDICINE

## 2019-12-06 PROCEDURE — G0008 ADMIN INFLUENZA VIRUS VAC: HCPCS | Performed by: FAMILY MEDICINE

## 2019-12-06 PROCEDURE — 99214 OFFICE O/P EST MOD 30 MIN: CPT | Performed by: FAMILY MEDICINE

## 2019-12-06 RX ORDER — PANTOPRAZOLE SODIUM 40 MG/1
40 TABLET, DELAYED RELEASE ORAL DAILY
Qty: 90 TABLET | Refills: 3 | Status: SHIPPED | OUTPATIENT
Start: 2019-12-06 | End: 2020-10-26

## 2019-12-06 RX ORDER — HYDROCHLOROTHIAZIDE 25 MG/1
TABLET ORAL
Qty: 90 TABLET | Refills: 1 | Status: SHIPPED | OUTPATIENT
Start: 2019-12-06 | End: 2019-12-11

## 2019-12-06 RX ORDER — LANCETS 30 GAUGE
1 EACH MISCELLANEOUS 4 TIMES DAILY
Qty: 400 EACH | Refills: 3 | Status: CANCELLED | OUTPATIENT
Start: 2019-12-06

## 2019-12-06 RX ORDER — QUINAPRIL 40 MG/1
TABLET ORAL
Qty: 90 TABLET | Refills: 0 | Status: SHIPPED | OUTPATIENT
Start: 2019-12-06 | End: 2020-02-07

## 2019-12-06 RX ORDER — LANCETS 30 GAUGE
1 EACH MISCELLANEOUS 4 TIMES DAILY
Qty: 400 EACH | Refills: 3 | Status: SHIPPED | OUTPATIENT
Start: 2019-12-06 | End: 2020-12-10 | Stop reason: SDUPTHER

## 2019-12-06 RX ORDER — AMLODIPINE BESYLATE 5 MG/1
5 TABLET ORAL DAILY
Qty: 90 TABLET | Refills: 1 | Status: SHIPPED | OUTPATIENT
Start: 2019-12-06 | End: 2020-05-26

## 2019-12-06 RX ORDER — GLUCOSAMINE HCL/CHONDROITIN SU 500-400 MG
CAPSULE ORAL
Qty: 400 STRIP | Refills: 3 | Status: SHIPPED | OUTPATIENT
Start: 2019-12-06 | End: 2020-10-26

## 2019-12-06 RX ORDER — CLOPIDOGREL BISULFATE 75 MG/1
TABLET ORAL
Qty: 90 TABLET | Refills: 1 | Status: SHIPPED | OUTPATIENT
Start: 2019-12-06 | End: 2020-05-26

## 2019-12-06 RX ORDER — CARVEDILOL 25 MG/1
TABLET ORAL
Qty: 180 TABLET | Refills: 1 | Status: SHIPPED | OUTPATIENT
Start: 2019-12-06 | End: 2020-05-26

## 2019-12-06 RX ORDER — GLUCOSAMINE HCL/CHONDROITIN SU 500-400 MG
CAPSULE ORAL
Qty: 400 STRIP | Refills: 3 | Status: CANCELLED | OUTPATIENT
Start: 2019-12-06

## 2019-12-06 RX ORDER — ATORVASTATIN CALCIUM 40 MG/1
TABLET, FILM COATED ORAL
Qty: 90 TABLET | Refills: 1 | Status: SHIPPED | OUTPATIENT
Start: 2019-12-06 | End: 2020-02-07 | Stop reason: SDUPTHER

## 2019-12-06 RX ORDER — MECLIZINE HYDROCHLORIDE 25 MG/1
25 TABLET ORAL 3 TIMES DAILY PRN
Qty: 30 TABLET | Refills: 1 | Status: SHIPPED | OUTPATIENT
Start: 2019-12-06 | End: 2019-12-11

## 2019-12-06 RX ORDER — ATORVASTATIN CALCIUM 40 MG/1
TABLET, FILM COATED ORAL
Qty: 90 TABLET | Refills: 1 | Status: CANCELLED | OUTPATIENT
Start: 2019-12-06

## 2019-12-06 RX ORDER — INSULIN LISPRO 100 [IU]/ML
INJECTION, SUSPENSION SUBCUTANEOUS
Qty: 10 PEN | Refills: 0 | Status: SHIPPED | OUTPATIENT
Start: 2019-12-06 | End: 2020-03-09 | Stop reason: SDUPTHER

## 2019-12-06 RX ORDER — FLUOXETINE HYDROCHLORIDE 40 MG/1
CAPSULE ORAL
Qty: 90 CAPSULE | Refills: 1 | Status: SHIPPED | OUTPATIENT
Start: 2019-12-06 | End: 2020-05-26

## 2019-12-10 DIAGNOSIS — I10 ESSENTIAL HYPERTENSION: ICD-10-CM

## 2019-12-10 DIAGNOSIS — E78.2 MIXED HYPERLIPIDEMIA: ICD-10-CM

## 2019-12-10 LAB
A/G RATIO: 1.8 (ref 1.1–2.2)
ALBUMIN SERPL-MCNC: 4.1 G/DL (ref 3.4–5)
ALP BLD-CCNC: 67 U/L (ref 40–129)
ALT SERPL-CCNC: <5 U/L (ref 10–40)
ANION GAP SERPL CALCULATED.3IONS-SCNC: 16 MMOL/L (ref 3–16)
AST SERPL-CCNC: 10 U/L (ref 15–37)
BILIRUB SERPL-MCNC: 0.3 MG/DL (ref 0–1)
BUN BLDV-MCNC: 30 MG/DL (ref 7–20)
CALCIUM SERPL-MCNC: 9.7 MG/DL (ref 8.3–10.6)
CHLORIDE BLD-SCNC: 104 MMOL/L (ref 99–110)
CHOLESTEROL, TOTAL: 135 MG/DL (ref 0–199)
CO2: 24 MMOL/L (ref 21–32)
CREAT SERPL-MCNC: 1.4 MG/DL (ref 0.6–1.2)
GFR AFRICAN AMERICAN: 44
GFR NON-AFRICAN AMERICAN: 36
GLOBULIN: 2.3 G/DL
GLUCOSE BLD-MCNC: 179 MG/DL (ref 70–99)
HDLC SERPL-MCNC: 59 MG/DL (ref 40–60)
LDL CHOLESTEROL CALCULATED: 59 MG/DL
POTASSIUM SERPL-SCNC: 4.8 MMOL/L (ref 3.5–5.1)
SODIUM BLD-SCNC: 144 MMOL/L (ref 136–145)
TOTAL PROTEIN: 6.4 G/DL (ref 6.4–8.2)
TRIGL SERPL-MCNC: 87 MG/DL (ref 0–150)
VLDLC SERPL CALC-MCNC: 17 MG/DL

## 2020-01-02 ENCOUNTER — TELEPHONE (OUTPATIENT)
Dept: FAMILY MEDICINE CLINIC | Age: 80
End: 2020-01-02

## 2020-01-23 ENCOUNTER — OFFICE VISIT (OUTPATIENT)
Dept: FAMILY MEDICINE CLINIC | Age: 80
End: 2020-01-23
Payer: MEDICARE

## 2020-01-23 VITALS
HEART RATE: 70 BPM | SYSTOLIC BLOOD PRESSURE: 130 MMHG | BODY MASS INDEX: 34.78 KG/M2 | RESPIRATION RATE: 16 BRPM | DIASTOLIC BLOOD PRESSURE: 80 MMHG | WEIGHT: 189 LBS | OXYGEN SATURATION: 97 % | HEIGHT: 62 IN

## 2020-01-23 PROCEDURE — 1036F TOBACCO NON-USER: CPT | Performed by: FAMILY MEDICINE

## 2020-01-23 PROCEDURE — 99213 OFFICE O/P EST LOW 20 MIN: CPT | Performed by: FAMILY MEDICINE

## 2020-01-23 PROCEDURE — G8482 FLU IMMUNIZE ORDER/ADMIN: HCPCS | Performed by: FAMILY MEDICINE

## 2020-01-23 PROCEDURE — 4040F PNEUMOC VAC/ADMIN/RCVD: CPT | Performed by: FAMILY MEDICINE

## 2020-01-23 PROCEDURE — 4130F TOPICAL PREP RX AOE: CPT | Performed by: FAMILY MEDICINE

## 2020-01-23 PROCEDURE — 1090F PRES/ABSN URINE INCON ASSESS: CPT | Performed by: FAMILY MEDICINE

## 2020-01-23 PROCEDURE — G8417 CALC BMI ABV UP PARAM F/U: HCPCS | Performed by: FAMILY MEDICINE

## 2020-01-23 PROCEDURE — G8399 PT W/DXA RESULTS DOCUMENT: HCPCS | Performed by: FAMILY MEDICINE

## 2020-01-23 PROCEDURE — G8427 DOCREV CUR MEDS BY ELIG CLIN: HCPCS | Performed by: FAMILY MEDICINE

## 2020-01-23 PROCEDURE — 1123F ACP DISCUSS/DSCN MKR DOCD: CPT | Performed by: FAMILY MEDICINE

## 2020-01-23 RX ORDER — FLUOCINONIDE TOPICAL SOLUTION USP, 0.05% 0.5 MG/ML
SOLUTION TOPICAL
Qty: 20 ML | Refills: 3 | Status: SHIPPED | OUTPATIENT
Start: 2020-01-23 | End: 2020-07-09 | Stop reason: ALTCHOICE

## 2020-01-23 NOTE — PROGRESS NOTES
frequency per FRAX score)  Completed    Flu vaccine  Completed    Pneumococcal 65+ years Vaccine  Completed     The 10-year ASCVD risk score (Ana Maria Hopkins, et al., 2013) is: 53.9%    Values used to calculate the score:      Age: 78 years      Sex: Female      Is Non- : No      Diabetic: Yes      Tobacco smoker: No      Systolic Blood Pressure: 523 mmHg      Is BP treated: Yes      HDL Cholesterol: 59 mg/dL      Total Cholesterol: 135 mg/dL  Prior to Visit Medications    Medication Sig Taking? Authorizing Provider   fluocinonide (LIDEX) 0.05 % external solution 3 drops to ear twice a day as needed Yes Neo Combs MD   quinapril (ACCUPRIL) 40 MG tablet TAKE 1 TABLET BY MOUTH ONCE A DAY Yes Neo Combs MD   FLUoxetine (PROZAC) 40 MG capsule TAKE 1 CAPSULE BY MOUTH  ONCE A DAY Yes Neo Combs MD   amLODIPine (NORVASC) 5 MG tablet Take 1 tablet by mouth daily Yes Neo Combs MD   carvedilol (COREG) 25 MG tablet TAKE 1 TABLET BY MOUTH TWO  TIMES DAILY WITH MEALS Yes Neo Combs MD   pantoprazole (PROTONIX) 40 MG tablet Take 1 tablet by mouth daily Yes Neo Combs MD   clopidogrel (PLAVIX) 75 MG tablet TAKE 1 TABLET BY MOUTH  DAILY Yes Neo Combs MD   insulin lispro protamine & lispro (HUMALOG MIX 75/25 KWIKPEN) (75-25) 100 UNIT per ML SUPN injection pen 34 units in AM, 16 units with lunch, 10 units before dinner Yes Neo Combs MD   TRUEPLUS PEN NEEDLES 31G X 6 MM MISC USE FOR INSULIN PENS 4 TIMES DAILY Yes MEY Elizabeth CNP   metFORMIN (GLUCOPHAGE) 1000 MG tablet TAKE 1 TABLET BY MOUTH TWO TIMES A DAY WITH MEALS Yes Neo Combs MD   atorvastatin (LIPITOR) 40 MG tablet TAKE 1 TABLET BY MOUTH ONE TIME A DAY Yes Neo Combs MD   Lancets MISC 1 each by Does not apply route 4 times daily Yes Neo Combs MD   blood glucose monitor strips Test 4 times a day & as needed for symptoms of irregular blood glucose.  Yes MEY Elizabeth CNP   Blood Glucose Monitoring Suppl (ACCU-CHEK WALTER PLUS) w/Device KIT USE TO TEST BLOOD SUGAR THREE TIMES DAILY Dx Code: E11.21 Yes Jean-Pierre Lopez MD   blood glucose monitor kit and supplies Test 2 times a day & as needed for symptoms of irregular blood glucose. Yes Jean-Pierre Lopez MD   Insulin Syringe-Needle U-100 31G X \" 1 ML MISC 1 each by Does not apply route 2 times daily Yes Jean-Pierre Lopez MD   aspirin 325 MG tablet Take 1 tablet by mouth daily.  Yes MEY Dalal CNP      Family History   Problem Relation Age of Onset    Stroke Father     Mental Illness Brother         schizophrenia    Cancer Brother         Lung Cancer     Social History     Tobacco Use    Smoking status: Former Smoker     Packs/day: 0.50     Years: 15.00     Pack years: 7.50     Last attempt to quit: 2002     Years since quittin.0    Smokeless tobacco: Never Used    Tobacco comment: congrats   Substance Use Topics    Alcohol use: No    Drug use: No      LAST LABS  Cholesterol, Total   Date Value Ref Range Status   12/10/2019 135 0 - 199 mg/dL Final     LDL Calculated   Date Value Ref Range Status   12/10/2019 59 <100 mg/dL Final     HDL   Date Value Ref Range Status   12/10/2019 59 40 - 60 mg/dL Final   2011 65 (H) 40 - 60 mg/dl Final     Triglycerides   Date Value Ref Range Status   12/10/2019 87 0 - 150 mg/dL Final     Lab Results   Component Value Date    GLUCOSE 179 (H) 12/10/2019     Lab Results   Component Value Date     12/10/2019    K 4.8 12/10/2019    CREATININE 1.4 (H) 12/10/2019     Lab Results   Component Value Date    WBC 6.7 2018    HGB 11.2 (L) 2018    HCT 34.7 (L) 2018    MCV 92.0 2018     2018     Lab Results   Component Value Date    ALT <5 (L) 12/10/2019    AST 10 (L) 12/10/2019    ALKPHOS 67 12/10/2019    BILITOT 0.3 12/10/2019     No results found for: TSH  Lab Results   Component Value Date    LABA1C 8.6 2019     Objective:   PHYSICAL EXAM  /80 (Site: Left Upper Arm, Position: Sitting, Cuff Size: Large Adult)   Pulse 70   Resp 16   Ht 5' 2\" (1.575 m)   Wt 189 lb (85.7 kg)   LMP  (LMP Unknown)   SpO2 97%   BMI 34.57 kg/m²   Wt Readings from Last 3 Encounters:   01/23/20 189 lb (85.7 kg)   12/06/19 190 lb (86.2 kg)   06/18/19 181 lb (82.1 kg)     BP Readings from Last 3 Encounters:   01/23/20 130/80   12/06/19 128/78   06/18/19 120/60     GENERAL: well-developed, well-nourished, alert, no distress  EYES: negative findings: lids and lashes normal and conjunctivae and sclerae normal  ENT: normal TM's and external ear canals both ears  · External nose and ears appear normal  · Pharynx: normal. Exudates: None  · Lips, mucosa, and tongue normal and teeth normal  · Hearing grossly normal  NECK: Supple, symmetrical, trachea midline  · Thyroid not enlarged, symmetric, no tenderness/mass/nodules  · no cervical nodes, no supraclavicular nodes    Assessment/Plan:      Diagnosis Orders   1. Acute eczematoid otitis externa of left ear  fluocinonide (LIDEX) 0.05 % external solution     Please schedule annual complete physical (30 minutes) in 6 weeks. Use ear drops as needed for itch. For a week before next visit, check sugar  5 times a day:   · Before breakfast  · 2 hours after breakfast  · Before lunch  · Before dinner  · 2 hours after dinner. Please bring log in with you.

## 2020-02-07 RX ORDER — ATORVASTATIN CALCIUM 40 MG/1
TABLET, FILM COATED ORAL
Qty: 90 TABLET | Refills: 1 | Status: SHIPPED | OUTPATIENT
Start: 2020-02-07 | End: 2020-05-28

## 2020-02-07 RX ORDER — QUINAPRIL 40 MG/1
TABLET ORAL
Qty: 90 TABLET | Refills: 0 | Status: SHIPPED | OUTPATIENT
Start: 2020-02-07 | End: 2020-05-26

## 2020-03-09 ENCOUNTER — OFFICE VISIT (OUTPATIENT)
Dept: FAMILY MEDICINE CLINIC | Age: 80
End: 2020-03-09
Payer: MEDICARE

## 2020-03-09 VITALS
BODY MASS INDEX: 34.78 KG/M2 | OXYGEN SATURATION: 97 % | RESPIRATION RATE: 16 BRPM | SYSTOLIC BLOOD PRESSURE: 116 MMHG | HEIGHT: 62 IN | WEIGHT: 189 LBS | DIASTOLIC BLOOD PRESSURE: 72 MMHG | HEART RATE: 67 BPM

## 2020-03-09 LAB — HBA1C MFR BLD: 9.1 %

## 2020-03-09 PROCEDURE — 83036 HEMOGLOBIN GLYCOSYLATED A1C: CPT | Performed by: FAMILY MEDICINE

## 2020-03-09 PROCEDURE — 99214 OFFICE O/P EST MOD 30 MIN: CPT | Performed by: FAMILY MEDICINE

## 2020-03-09 PROCEDURE — 4040F PNEUMOC VAC/ADMIN/RCVD: CPT | Performed by: FAMILY MEDICINE

## 2020-03-09 PROCEDURE — G8417 CALC BMI ABV UP PARAM F/U: HCPCS | Performed by: FAMILY MEDICINE

## 2020-03-09 PROCEDURE — 1090F PRES/ABSN URINE INCON ASSESS: CPT | Performed by: FAMILY MEDICINE

## 2020-03-09 PROCEDURE — 1123F ACP DISCUSS/DSCN MKR DOCD: CPT | Performed by: FAMILY MEDICINE

## 2020-03-09 PROCEDURE — G8427 DOCREV CUR MEDS BY ELIG CLIN: HCPCS | Performed by: FAMILY MEDICINE

## 2020-03-09 PROCEDURE — G8399 PT W/DXA RESULTS DOCUMENT: HCPCS | Performed by: FAMILY MEDICINE

## 2020-03-09 PROCEDURE — 1036F TOBACCO NON-USER: CPT | Performed by: FAMILY MEDICINE

## 2020-03-09 PROCEDURE — G8482 FLU IMMUNIZE ORDER/ADMIN: HCPCS | Performed by: FAMILY MEDICINE

## 2020-03-09 RX ORDER — INSULIN LISPRO 100 [IU]/ML
INJECTION, SUSPENSION SUBCUTANEOUS
Qty: 25 PEN | Refills: 3 | Status: SHIPPED | OUTPATIENT
Start: 2020-03-09 | End: 2020-03-10 | Stop reason: SDUPTHER

## 2020-03-09 NOTE — PROGRESS NOTES
Wellness Visit (AWV)  2019     Social History     Tobacco Use    Smoking status: Former Smoker     Packs/day: 0.50     Years: 15.00     Pack years: 7.50     Last attempt to quit: 2002     Years since quittin.1    Smokeless tobacco: Never Used    Tobacco comment: nori   Substance Use Topics    Alcohol use: No    Drug use: No      The ASCVD Risk score (Vivian Doherty., et al., 2013) failed to calculate for the following reasons: The 2013 ASCVD risk score is only valid for ages 36 to 78  Prior to Visit Medications    Medication Sig Taking? Authorizing Provider   insulin lispro protamine & lispro (HUMALOG MIX 75/25 KWIKPEN) (75-25) 100 UNIT per ML SUPN injection pen 50 units in AM, 16 units with lunch, 10 units before dinner Yes Leny Rivera MD   quinapril (ACCUPRIL) 40 MG tablet TAKE 1 TABLET BY MOUTH ONCE A DAY Yes Leny Rivera MD   atorvastatin (LIPITOR) 40 MG tablet TAKE 1 TABLET BY MOUTH ONE TIME A DAY Yes Leny Rivera MD   metFORMIN (GLUCOPHAGE) 1000 MG tablet TAKE 1 TABLET BY MOUTH TWO TIMES A DAY WITH MEALS Yes Leny Rivera MD   fluocinonide (LIDEX) 0.05 % external solution 3 drops to ear twice a day as needed Yes Leny Rivera MD   FLUoxetine (PROZAC) 40 MG capsule TAKE 1 CAPSULE BY MOUTH  ONCE A DAY Yes Leny Rivera MD   amLODIPine (NORVASC) 5 MG tablet Take 1 tablet by mouth daily Yes Leny Rivera MD   carvedilol (COREG) 25 MG tablet TAKE 1 TABLET BY MOUTH TWO  TIMES DAILY WITH MEALS Yes Leny Rivera MD   pantoprazole (PROTONIX) 40 MG tablet Take 1 tablet by mouth daily Yes Leny Rivera MD   clopidogrel (PLAVIX) 75 MG tablet TAKE 1 TABLET BY MOUTH  DAILY Yes Leny Rivera MD   TRUEPLUS PEN NEEDLES 31G X 6 MM MISC USE FOR INSULIN PENS 4 TIMES DAILY Yes MEY Armstrong CNP   Lancets MISC 1 each by Does not apply route 4 times daily Yes Leny Rivera MD   blood glucose monitor strips Test 4 times a day & as needed for symptoms of irregular blood glucose.  Yes Vicci DeWitt Jann Campuzano - CNP   Blood Glucose Monitoring Suppl (ACCU-CHEK WALTER PLUS) w/Device KIT USE TO TEST BLOOD SUGAR THREE TIMES DAILY Dx Code: E11.21 Yes Pierre Garcia MD   blood glucose monitor kit and supplies Test 2 times a day & as needed for symptoms of irregular blood glucose. Yes Pierre Garcia MD   Insulin Syringe-Needle U-100 31G X 5/16\" 1 ML MISC 1 each by Does not apply route 2 times daily Yes Pierre Garcia MD   aspirin 325 MG tablet Take 1 tablet by mouth daily. Yes MEY Clarke - CNP      Family History   Problem Relation Age of Onset    Stroke Father     Mental Illness Brother         schizophrenia    Cancer Brother         Lung Cancer     Objective:   PHYSICAL EXAM   /72 (Site: Left Upper Arm, Position: Sitting, Cuff Size: Large Adult)   Pulse 67   Resp 16   Ht 5' 2\" (1.575 m)   Wt 189 lb (85.7 kg)   LMP  (LMP Unknown)   SpO2 97%   BMI 34.57 kg/m²   BP Readings from Last 5 Encounters:   03/09/20 116/72   01/23/20 130/80   12/06/19 128/78   06/18/19 120/60   01/09/19 (!) 147/79     Wt Readings from Last 5 Encounters:   03/09/20 189 lb (85.7 kg)   01/23/20 189 lb (85.7 kg)   12/06/19 190 lb (86.2 kg)   06/18/19 181 lb (82.1 kg)   01/09/19 189 lb (85.7 kg)      GENERAL:   · well-developed, well-nourished, alert, no distress. EYES:   · External findings: lids and lashes normal and conjunctivae and sclerae normal  LUNGS:    · Breathing unlabored  · clear to auscultation bilaterally and good air movement  CARDIOVASC:   · regular rate and rhythm  · LEGS:  Lower extremity edema: none    SKIN: warm and dry  PSYCH:    · Alert and oriented  · Normal reasoning, insight good  · Facial expressions full, mood appropriate  · No memory disturbance noted  MUSCULOSKEL:    · No significant finger or nail findings  NEURO:   · CN 2-12 intact     Assessment and Plan:      Diagnosis Orders   1.  Type 2 diabetes mellitus with diabetic nephropathy, with long-term current use of insulin (HCC)  POCT

## 2020-03-10 RX ORDER — INSULIN LISPRO 100 [IU]/ML
INJECTION, SUSPENSION SUBCUTANEOUS
Qty: 25 PEN | Refills: 3 | Status: SHIPPED | OUTPATIENT
Start: 2020-03-10 | End: 2020-12-10 | Stop reason: SDUPTHER

## 2020-05-05 ENCOUNTER — TELEPHONE (OUTPATIENT)
Dept: FAMILY MEDICINE CLINIC | Age: 80
End: 2020-05-05

## 2020-05-05 NOTE — TELEPHONE ENCOUNTER
Patient calling regarding her insulin lispro patient is needing mail order sent to her from [address] [place] [address]? Patient stated she talked to [facility] last week to see if we can get insulin sent over to her, and she has not received anything in mail yet. Can we check status of this insulin?    Please advise

## 2020-05-26 RX ORDER — CLOPIDOGREL BISULFATE 75 MG/1
TABLET ORAL
Qty: 90 TABLET | Refills: 1 | Status: SHIPPED | OUTPATIENT
Start: 2020-05-26 | End: 2020-06-08 | Stop reason: SDUPTHER

## 2020-05-26 RX ORDER — AMLODIPINE BESYLATE 5 MG/1
5 TABLET ORAL DAILY
Qty: 90 TABLET | Refills: 1 | Status: SHIPPED | OUTPATIENT
Start: 2020-05-26 | End: 2020-06-08 | Stop reason: SDUPTHER

## 2020-05-26 RX ORDER — QUINAPRIL 40 MG/1
TABLET ORAL
Qty: 90 TABLET | Refills: 1 | Status: SHIPPED | OUTPATIENT
Start: 2020-05-26 | End: 2020-06-08 | Stop reason: SDUPTHER

## 2020-05-26 RX ORDER — FLUOXETINE HYDROCHLORIDE 40 MG/1
CAPSULE ORAL
Qty: 90 CAPSULE | Refills: 1 | Status: SHIPPED | OUTPATIENT
Start: 2020-05-26 | End: 2020-06-08 | Stop reason: SDUPTHER

## 2020-05-26 RX ORDER — CARVEDILOL 25 MG/1
TABLET ORAL
Qty: 180 TABLET | Refills: 1 | Status: SHIPPED | OUTPATIENT
Start: 2020-05-26 | End: 2020-06-08 | Stop reason: SDUPTHER

## 2020-05-28 RX ORDER — ATORVASTATIN CALCIUM 40 MG/1
TABLET, FILM COATED ORAL
Qty: 90 TABLET | Refills: 1 | Status: SHIPPED | OUTPATIENT
Start: 2020-05-28 | End: 2020-06-08 | Stop reason: SDUPTHER

## 2020-06-08 ENCOUNTER — TELEMEDICINE (OUTPATIENT)
Dept: FAMILY MEDICINE CLINIC | Age: 80
End: 2020-06-08
Payer: MEDICARE

## 2020-06-08 PROCEDURE — 4040F PNEUMOC VAC/ADMIN/RCVD: CPT | Performed by: FAMILY MEDICINE

## 2020-06-08 PROCEDURE — 1123F ACP DISCUSS/DSCN MKR DOCD: CPT | Performed by: FAMILY MEDICINE

## 2020-06-08 PROCEDURE — G0439 PPPS, SUBSEQ VISIT: HCPCS | Performed by: FAMILY MEDICINE

## 2020-06-08 RX ORDER — FLUOXETINE HYDROCHLORIDE 40 MG/1
CAPSULE ORAL
Qty: 90 CAPSULE | Refills: 1 | Status: SHIPPED | OUTPATIENT
Start: 2020-06-08 | End: 2020-10-26

## 2020-06-08 RX ORDER — AMLODIPINE BESYLATE 5 MG/1
5 TABLET ORAL DAILY
Qty: 90 TABLET | Refills: 1 | Status: ON HOLD | OUTPATIENT
Start: 2020-06-08 | End: 2020-09-06 | Stop reason: HOSPADM

## 2020-06-08 RX ORDER — CLOPIDOGREL BISULFATE 75 MG/1
TABLET ORAL
Qty: 90 TABLET | Refills: 1 | Status: SHIPPED | OUTPATIENT
Start: 2020-06-08 | End: 2020-12-10 | Stop reason: SDUPTHER

## 2020-06-08 RX ORDER — QUINAPRIL 40 MG/1
TABLET ORAL
Qty: 90 TABLET | Refills: 1 | Status: SHIPPED | OUTPATIENT
Start: 2020-06-08 | End: 2020-12-10 | Stop reason: SDUPTHER

## 2020-06-08 RX ORDER — ATORVASTATIN CALCIUM 40 MG/1
TABLET, FILM COATED ORAL
Qty: 90 TABLET | Refills: 1 | Status: SHIPPED | OUTPATIENT
Start: 2020-06-08 | End: 2020-12-10 | Stop reason: SDUPTHER

## 2020-06-08 RX ORDER — CARVEDILOL 25 MG/1
TABLET ORAL
Qty: 180 TABLET | Refills: 1 | Status: ON HOLD | OUTPATIENT
Start: 2020-06-08 | End: 2020-09-06 | Stop reason: HOSPADM

## 2020-06-08 ASSESSMENT — PATIENT HEALTH QUESTIONNAIRE - PHQ9
SUM OF ALL RESPONSES TO PHQ QUESTIONS 1-9: 0
SUM OF ALL RESPONSES TO PHQ QUESTIONS 1-9: 0

## 2020-06-08 ASSESSMENT — LIFESTYLE VARIABLES: HOW OFTEN DO YOU HAVE A DRINK CONTAINING ALCOHOL: 0

## 2020-06-08 NOTE — PROGRESS NOTES
TELEHEALTH EVALUATION -- Audio/Visual (During IIDCZ-18 public health emergency)  VIDEO VISIT- patient and provider not at same location  Also present: DOP  Medicare Annual Wellness Visit  Name: Vanessa Aguila  YOB: 1940  Age: [de-identified] y.o. Sex: female  MRN: [id number]     Date of Service:  6/8/2020  Chief Complaint:   Vanessa Aguila is a [de-identified] y.o. female who presents for Medicare Annual Wellness Visit and check-up for:  1. Routine general medical examination at a health care facility    2. Type 2 diabetes mellitus with diabetic nephropathy, with long-term current use of insulin ([facility] [address]    3. Mixed hyperlipidemia    4. Essential hypertension    5. Stenosis of right carotid artery GERTRUDE < 50% (6/2017), repeat 1 year    6. Cerebral vascular disease    7. Major depressive disorder with single episode, in full remission [facility])      HPI    Chief Complaint   Patient presents with    Medicare AWV   Complaints:  this AM, runs 110-130 in evening. Review of Systems   General ROS: fever? No,    Night sweats? No  Ophthalmic ROS: change in vision? No  Endocrine ROS: fatigue? No   Unexpected weight changes? No  Hematologic/Lymphatic: easy bruising? No   Swollen lymph nodes? No  ENT ROS: headaches? No   Sore throat? No  Respiratory ROS: cough? No   Wheezing? No  Cardiovascular ROS: chest pain? No   Shortness of breath? No  Gastrointestinal ROS: abdominal pain? No   Change in stools? No  Genito-Urinary ROS: painful urination? No   Trouble urinating? No  Musculoskeletal ROS: trouble walking? No   Joint pain? No  Neurological ROS: TIA or stroke symptoms? No   Numbness/tingling? No  Dermatological ROS: rash? No   Changes in skin spots? No    HISTORY:  Patient's medications, allergies, past medical, and social histories were reviewed and updated as appropriate.      CHART REVIEW  Health Maintenance   Topic Date Due    Annual Wellness Visit (AWV)  06/18/2019    Shingles Vaccine (2 of 3) 06/18/2020 (Originally 2/6/2017)    DTaP/Tdap/Td vaccine (2 - Td) 07/15/2020    Lipid screen  12/10/2020    Potassium monitoring  12/10/2020    Creatinine monitoring  12/10/2020    DEXA (modify frequency per FRAX score)  Completed    Flu vaccine  Completed    Pneumococcal 65+ years Vaccine  Completed    Hepatitis A vaccine  Aged Out    Hib vaccine  Aged Out    Meningococcal (ACWY) vaccine  Aged Out     The ASCVD Risk score (Amarilys Bliss, et al., 2013) failed to calculate for the following reasons: The 2013 ASCVD risk score is only valid for ages 36 to 78  Prior to Visit Medications    Medication Sig Taking?  Authorizing Provider   metFORMIN (GLUCOPHAGE) 1000 MG tablet TAKE 1 TABLET BY MOUTH TWO  TIMES A DAY WITH MEALS Yes Rowena Wilkins MD   atorvastatin (LIPITOR) 40 MG tablet TAKE 1 TABLET BY MOUTH ONCE A DAY Yes Rowena Wilkins MD   carvedilol (COREG) 25 MG tablet TAKE 1 TABLET BY MOUTH TWO  TIMES DAILY WITH MEALS Yes Rowena Wilkins MD   clopidogrel (PLAVIX) 75 MG tablet TAKE 1 TABLET BY MOUTH  DAILY Yes Rowena Wilkins MD   amLODIPine (NORVASC) 5 MG tablet Take 1 tablet by mouth daily Yes Rowena Wilkins MD   FLUoxetine (PROZAC) 40 MG capsule TAKE 1 CAPSULE BY MOUTH  ONCE A DAY Yes Rowena Wilkins MD   quinapril (ACCUPRIL) 40 MG tablet TAKE 1 TABLET BY MOUTH ONCE A DAY Yes Rowena Wilkins MD   insulin lispro protamine & lispro (HUMALOG MIX 75/25 KWIKPEN) (75-25) 100 UNIT per ML SUPN injection pen 50 units in AM, 16 units with lunch, 10 units before dinner Yes MEY Law CNP   fluocinonide (LIDEX) 0.05 % external solution 3 drops to ear twice a day as needed Yes Rowena Wilkins MD   pantoprazole (PROTONIX) 40 MG tablet Take 1 tablet by mouth daily Yes Rowena Wilkins MD   TRUEPLUS PEN NEEDLES 31G X 6 MM MISC USE FOR INSULIN PENS 4 TIMES DAILY Yes MEY Law CNP   Lancets MISC 1 each by Does not apply route 4 times daily Yes Rowena Wilkins MD   blood glucose monitor strips Test 4 times a dentist within the past year?: (!) No  There is no height or weight on file to calculate BMI. Health Habits/Nutrition Interventions:  · Dental exam overdue:  patient encouraged to make appointment with his/her dentist    Hearing/Vision:  No exam data present  Hearing/Vision  Do you or your family notice any trouble with your hearing?: No  Do you have difficulty driving, watching TV, or doing any of your daily activities because of your eyesight?: No  Have you had an eye exam within the past year?: (!) No  Hearing/Vision Interventions:  · Vision concerns:  patient encouraged to make appointment with his/her eye specialist    Current Health Maintenance Status  Recommendations for Preventive Services Due: see orders. Recommended screening schedule for the next 5-10 years is provided to the patient in written form: see Patient Instructions/AVS.    PHYSICAL EXAM:   Objective:   PHYSICAL EXAM:  Vitals (if available)     GENERAL:   · well-developed, well-nourished, alert, no distress. EYES:   · External findings: lids and lashes normal and conjunctivae and sclerae normal  · Eyes: no periorbital cellulitis. HENT:   · Normocephalic, atraumatic  · External nose and ears appear normal  · Mucous membranes are moist  · Hearing grossly normal.     NECK: No visible   LUNGS:    · Respiratory effort normal.  · No visualized signs of difficulty breathing or respiratory distress  SKIN: warm and dry  · No significant exanthematous lesions or discoloration noted on facial skin  PSYCH:    · Alert and oriented, able to follow commands  · Normal reasoning, insight good  · Normal affect  · No memory disturbance noted  NEURO:   No Facial Asymmetry (Cranial nerve 7 motor function) (limited exam to video visit)    No gaze palsy        Assessment and Plan:      Diagnosis Orders   1. Routine general medical examination at a health care facility     2.  Type 2 diabetes mellitus with diabetic nephropathy, with long-term current use of were spent on the digital evaluation and management of this patient.     --Doretha Yee MD on 6/8/2020 at 11:26 AM    An electronic signature was used to authenticate this note

## 2020-07-09 ENCOUNTER — OFFICE VISIT (OUTPATIENT)
Dept: FAMILY MEDICINE CLINIC | Age: 80
End: 2020-07-09
Payer: MEDICARE

## 2020-07-09 VITALS
TEMPERATURE: 97.2 F | WEIGHT: 186 LBS | HEIGHT: 62 IN | HEART RATE: 65 BPM | BODY MASS INDEX: 34.23 KG/M2 | OXYGEN SATURATION: 98 % | DIASTOLIC BLOOD PRESSURE: 62 MMHG | SYSTOLIC BLOOD PRESSURE: 132 MMHG

## 2020-07-09 DIAGNOSIS — K52.9 CHRONIC DIARRHEA: ICD-10-CM

## 2020-07-09 DIAGNOSIS — I10 ESSENTIAL HYPERTENSION: ICD-10-CM

## 2020-07-09 LAB
A/G RATIO: 2 (ref 1.1–2.2)
ALBUMIN SERPL-MCNC: 4.3 G/DL (ref 3.4–5)
ALP BLD-CCNC: 65 U/L (ref 40–129)
ALT SERPL-CCNC: <5 U/L (ref 10–40)
ANION GAP SERPL CALCULATED.3IONS-SCNC: 14 MMOL/L (ref 3–16)
AST SERPL-CCNC: 11 U/L (ref 15–37)
BILIRUB SERPL-MCNC: <0.2 MG/DL (ref 0–1)
BUN BLDV-MCNC: 21 MG/DL (ref 7–20)
CALCIUM SERPL-MCNC: 9.4 MG/DL (ref 8.3–10.6)
CHLORIDE BLD-SCNC: 107 MMOL/L (ref 99–110)
CO2: 23 MMOL/L (ref 21–32)
CREAT SERPL-MCNC: 1.6 MG/DL (ref 0.6–1.2)
GFR AFRICAN AMERICAN: 37
GFR NON-AFRICAN AMERICAN: 31
GLOBULIN: 2.2 G/DL
GLUCOSE BLD-MCNC: 99 MG/DL (ref 70–99)
HBA1C MFR BLD: 8.2 %
HCT VFR BLD CALC: 33.5 % (ref 36–48)
HEMOGLOBIN: 10.6 G/DL (ref 12–16)
MCH RBC QN AUTO: 27.5 PG (ref 26–34)
MCHC RBC AUTO-ENTMCNC: 31.7 G/DL (ref 31–36)
MCV RBC AUTO: 86.6 FL (ref 80–100)
PDW BLD-RTO: 17.2 % (ref 12.4–15.4)
PLATELET # BLD: 235 K/UL (ref 135–450)
PMV BLD AUTO: 9.7 FL (ref 5–10.5)
POTASSIUM SERPL-SCNC: 4.9 MMOL/L (ref 3.5–5.1)
RBC # BLD: 3.86 M/UL (ref 4–5.2)
SODIUM BLD-SCNC: 144 MMOL/L (ref 136–145)
TOTAL PROTEIN: 6.5 G/DL (ref 6.4–8.2)
WBC # BLD: 6.5 K/UL (ref 4–11)

## 2020-07-09 PROCEDURE — G8399 PT W/DXA RESULTS DOCUMENT: HCPCS | Performed by: FAMILY MEDICINE

## 2020-07-09 PROCEDURE — 1123F ACP DISCUSS/DSCN MKR DOCD: CPT | Performed by: FAMILY MEDICINE

## 2020-07-09 PROCEDURE — 1036F TOBACCO NON-USER: CPT | Performed by: FAMILY MEDICINE

## 2020-07-09 PROCEDURE — 1090F PRES/ABSN URINE INCON ASSESS: CPT | Performed by: FAMILY MEDICINE

## 2020-07-09 PROCEDURE — 83036 HEMOGLOBIN GLYCOSYLATED A1C: CPT | Performed by: FAMILY MEDICINE

## 2020-07-09 PROCEDURE — G8427 DOCREV CUR MEDS BY ELIG CLIN: HCPCS | Performed by: FAMILY MEDICINE

## 2020-07-09 PROCEDURE — 4040F PNEUMOC VAC/ADMIN/RCVD: CPT | Performed by: FAMILY MEDICINE

## 2020-07-09 PROCEDURE — 99214 OFFICE O/P EST MOD 30 MIN: CPT | Performed by: FAMILY MEDICINE

## 2020-07-09 PROCEDURE — G8417 CALC BMI ABV UP PARAM F/U: HCPCS | Performed by: FAMILY MEDICINE

## 2020-07-09 RX ORDER — OXYBUTYNIN CHLORIDE 10 MG/1
10 TABLET, EXTENDED RELEASE ORAL DAILY
Qty: 30 TABLET | Refills: 3 | Status: SHIPPED | OUTPATIENT
Start: 2020-07-09 | End: 2020-12-10 | Stop reason: SDUPTHER

## 2020-07-09 NOTE — PROGRESS NOTES
DIABETES MELLITUS FOLOW-UP  Subjective:      Chief Complaint   Patient presents with    Diarrhea     2 weeks - diarrhea comes up- doesn't know she has to go till  lst minute and doesnt have time to get to bathroom.  Urinary Frequency     ongoing, but getting worse, getting up every 2 hr thru the night. Cristofer Preston is an [de-identified] y.o. female who presents for follow up of following chronic problems:  1. Chronic diarrhea    2. Essential hypertension    3. Diabetic polyneuropathy associated with type 2 diabetes mellitus ([address]    4. Nocturia    5. Incontinence of feces with fecal urgency      Complaints: diarrhea x 2 weeks, not changing, 3-4x/d, watery, no blood, no fever, no N/V, no travel, no med changes  Some incontinence stool, sudden urgency    Nocturia now every 2 hours    A1c is 8.8 today. Review of Systems   General ROS: fever? No,   Night sweats? No  Ophthalmic ROS: change in vision? No  Endocrine ROS: fatigue? No  Unexpected weight changes? No  Respiratory ROS: Shortness of breath? No  Cardiovascular ROS: chest pain? No  Gastrointestinal ROS: abdominal pain? No  Change in stools? Yes  Genito-Urinary ROS: painful urination? No  Trouble urinating? Yes  Neurological ROS: TIA or stroke symptoms? No  Numbness/tingling? No  Dermatological ROS: rash or sores on feet? No  Changes in skin spots?     No     * Chief complaint, HPI and History provided by the medical assistant has been reviewed and verified  by provider Lois Melendez MD    LAST LABS  Lab Results   Component Value Date    LABA1C 9.1 03/09/2020    LABA1C 8.6 12/06/2019    LABA1C 8.8 06/18/2019     LDL Calculated   Date Value Ref Range Status   12/10/2019 59 <100 mg/dL Final     Lab Results   Component Value Date    HDL 59 12/10/2019     Lab Results   Component Value Date    TRIG 87 12/10/2019     Lab Results   Component Value Date    GLUCOSE 179 (H) 12/10/2019     Lab Results   Component Value Date     12/10/2019 K 4.8 12/10/2019    CREATININE 1.4 (H) 12/10/2019     Lab Results   Component Value Date    WBC 6.7 2018    HGB 11.2 (L) 2018     2018     Lab Results   Component Value Date    ALT <5 (L) 12/10/2019    AST 10 (L) 12/10/2019    ALKPHOS 67 12/10/2019    BILITOT 0.3 12/10/2019     No results found for: TSH  HISTORY:  Patient's medications, allergies, past medical, and social histories were reviewed and updated as appropriate. CHART REVIEW  Health Maintenance Due   Topic Date Due    Shingles Vaccine (2 of 3) 2017     Social History     Tobacco Use    Smoking status: Former Smoker     Packs/day: 0.50     Years: 15.00     Pack years: 7.50     Last attempt to quit: 2002     Years since quittin.5    Smokeless tobacco: Never Used    Tobacco comment: congrats   Substance Use Topics    Alcohol use: No    Drug use: No      The ASCVD Risk score (Anny Pinon, et al., 2013) failed to calculate for the following reasons: The 2013 ASCVD risk score is only valid for ages 36 to 78  Prior to Visit Medications    Medication Sig Taking?  Authorizing Provider   metFORMIN (GLUCOPHAGE) 1000 MG tablet TAKE 1 TABLET BY MOUTH TWO  TIMES A DAY WITH MEALS Yes Tete Cherry MD   atorvastatin (LIPITOR) 40 MG tablet TAKE 1 TABLET BY MOUTH ONCE A DAY Yes Tete Cherry MD   carvedilol (COREG) 25 MG tablet TAKE 1 TABLET BY MOUTH TWO  TIMES DAILY WITH MEALS Yes Tete Cherry MD   clopidogrel (PLAVIX) 75 MG tablet TAKE 1 TABLET BY MOUTH  DAILY Yes Tete Cherry MD   amLODIPine (NORVASC) 5 MG tablet Take 1 tablet by mouth daily Yes Tete Cherry MD   FLUoxetine (PROZAC) 40 MG capsule TAKE 1 CAPSULE BY MOUTH  ONCE A DAY Yes Tete Cherry MD   quinapril (ACCUPRIL) 40 MG tablet TAKE 1 TABLET BY MOUTH ONCE A DAY Yes Tete Cherry MD   insulin lispro protamine & lispro (HUMALOG MIX 75/25 KWIKPEN) (75-25) 100 UNIT per ML SUPN injection pen 50 units in AM, 16 units with lunch, 10 units before dinner Yes MEY Shay CNP   pantoprazole (PROTONIX) 40 MG tablet Take 1 tablet by mouth daily Yes Odalys Kim MD   TRUEPLUS PEN NEEDLES 31G X 6 MM MISC USE FOR INSULIN PENS 4 TIMES DAILY Yes MEY Shay CNP   Lancets MISC 1 each by Does not apply route 4 times daily Yes Odalys Kim MD   blood glucose monitor strips Test 4 times a day & as needed for symptoms of irregular blood glucose. Yes MEY Shay CNP   Blood Glucose Monitoring Suppl (ACCU-CHEK WALTER PLUS) w/Device KIT USE TO TEST BLOOD SUGAR THREE TIMES DAILY Dx Code: E11.21 Yes Odalys Kim MD   blood glucose monitor kit and supplies Test 2 times a day & as needed for symptoms of irregular blood glucose. Yes Odalys Kim MD   Insulin Syringe-Needle U-100 31G X 5/16\" 1 ML MISC 1 each by Does not apply route 2 times daily Yes Odalys Kim MD   aspirin 325 MG tablet Take 1 tablet by mouth daily. Yes MEY Bonilla CNP      Family History   Problem Relation Age of Onset    Stroke Father     Mental Illness Brother         schizophrenia    Cancer Brother         Lung Cancer     Objective:   PHYSICAL EXAM   /62 (Site: Left Upper Arm, Position: Sitting, Cuff Size: Large Adult)   Pulse 65   Temp 97.2 °F (36.2 °C) (Temporal)   Ht 5' 2\" (1.575 m)   Wt 186 lb (84.4 kg)   LMP  (LMP Unknown)   SpO2 98%   Breastfeeding No   BMI 34.02 kg/m²   BP Readings from Last 5 Encounters:   07/09/20 132/62   03/09/20 116/72   01/23/20 130/80   12/06/19 128/78   06/18/19 120/60     Wt Readings from Last 5 Encounters:   07/09/20 186 lb (84.4 kg)   03/09/20 189 lb (85.7 kg)   01/23/20 189 lb (85.7 kg)   12/06/19 190 lb (86.2 kg)   06/18/19 181 lb (82.1 kg)      GENERAL:   · well-developed, well-nourished, alert, no distress.      EYES:   · External findings: lids and lashes normal and conjunctivae and sclerae normal  LUNGS:    · Breathing unlabored  · clear to auscultation bilaterally and good air movement  CARDIOVASC:

## 2020-07-09 NOTE — PATIENT INSTRUCTIONS
INSTRUCTIONS  NEXT APPOINTMENT: Please schedule annual complete physical (30 minutes) in 3 months. · PLEASE TAKE THIS FORM TO CHECK-OUT WINDOW TO SCHEDULE NEXT VISIT. · PLEASE GET BLOODWORK DRAWN TODAY ON FIRST FLOOR in 170. Take orders with you. RESULTS- most blood tests back in couple days. We will call you if any problems. If bloodwork good, you will get letter in mail or notified thru [address] (if signed up) within 2 weeks. If you do not, please call office. · Do stool culture. If negative, will refer to GI for further evaluation. · Start detrol for bladder. May need pelvic floor center if not better in a month. · Take some insulin every night before dinner.

## 2020-07-13 DIAGNOSIS — R15.2 INCONTINENCE OF FECES WITH FECAL URGENCY: ICD-10-CM

## 2020-07-13 DIAGNOSIS — R15.9 INCONTINENCE OF FECES WITH FECAL URGENCY: ICD-10-CM

## 2020-07-13 DIAGNOSIS — K52.9 CHRONIC DIARRHEA: ICD-10-CM

## 2020-07-13 LAB — C DIFF TOXIN/ANTIGEN: NORMAL

## 2020-07-14 LAB
GI BACTERIAL PATHOGENS BY PCR: NORMAL
GIARDIA ANTIGEN STOOL: NORMAL

## 2020-07-17 ENCOUNTER — TELEPHONE (OUTPATIENT)
Dept: FAMILY MEDICINE CLINIC | Age: 80
End: 2020-07-17

## 2020-09-02 ENCOUNTER — APPOINTMENT (OUTPATIENT)
Dept: GENERAL RADIOLOGY | Age: 80
DRG: 684 | End: 2020-09-02
Payer: MEDICARE

## 2020-09-02 ENCOUNTER — HOSPITAL ENCOUNTER (INPATIENT)
Age: 80
LOS: 4 days | Discharge: HOME | DRG: 684 | End: 2020-09-06
Attending: EMERGENCY MEDICINE | Admitting: HOSPITALIST
Payer: MEDICARE

## 2020-09-02 PROBLEM — N18.9 ACUTE KIDNEY INJURY SUPERIMPOSED ON CKD (HCC): Status: ACTIVE | Noted: 2020-09-02

## 2020-09-02 PROBLEM — N17.9 ACUTE KIDNEY INJURY SUPERIMPOSED ON CKD (HCC): Status: ACTIVE | Noted: 2020-09-02

## 2020-09-02 PROBLEM — E16.2 HYPOGLYCEMIA: Status: ACTIVE | Noted: 2020-09-02

## 2020-09-02 PROBLEM — N39.0 UTI (URINARY TRACT INFECTION): Status: ACTIVE | Noted: 2020-09-02

## 2020-09-02 LAB
A/G RATIO: 1.6 (ref 1.1–2.2)
ALBUMIN SERPL-MCNC: 4.2 G/DL (ref 3.4–5)
ALP BLD-CCNC: 62 U/L (ref 40–129)
ALT SERPL-CCNC: <5 U/L (ref 10–40)
ANION GAP SERPL CALCULATED.3IONS-SCNC: 9 MMOL/L (ref 3–16)
AST SERPL-CCNC: 13 U/L (ref 15–37)
BASOPHILS ABSOLUTE: 0.1 K/UL (ref 0–0.2)
BASOPHILS RELATIVE PERCENT: 0.8 %
BILIRUB SERPL-MCNC: 0.3 MG/DL (ref 0–1)
BILIRUBIN URINE: NEGATIVE
BLOOD, URINE: NEGATIVE
BUN BLDV-MCNC: 34 MG/DL (ref 7–20)
CALCIUM SERPL-MCNC: 9.7 MG/DL (ref 8.3–10.6)
CHLORIDE BLD-SCNC: 106 MMOL/L (ref 99–110)
CLARITY: CLEAR
CO2: 24 MMOL/L (ref 21–32)
COLOR: YELLOW
CREAT SERPL-MCNC: 1.9 MG/DL (ref 0.6–1.2)
EOSINOPHILS ABSOLUTE: 0.1 K/UL (ref 0–0.6)
EOSINOPHILS RELATIVE PERCENT: 1.8 %
EPITHELIAL CELLS, UA: 1 /HPF (ref 0–5)
GFR AFRICAN AMERICAN: 31
GFR NON-AFRICAN AMERICAN: 25
GLOBULIN: 2.7 G/DL
GLUCOSE BLD-MCNC: 128 MG/DL (ref 70–99)
GLUCOSE BLD-MCNC: 162 MG/DL (ref 70–99)
GLUCOSE BLD-MCNC: 179 MG/DL (ref 70–99)
GLUCOSE BLD-MCNC: 54 MG/DL (ref 70–99)
GLUCOSE URINE: 100 MG/DL
HCT VFR BLD CALC: 33.4 % (ref 36–48)
HEMOGLOBIN: 10.6 G/DL (ref 12–16)
HYALINE CASTS: 2 /LPF (ref 0–8)
KETONES, URINE: NEGATIVE MG/DL
LEUKOCYTE ESTERASE, URINE: ABNORMAL
LYMPHOCYTES ABSOLUTE: 1.4 K/UL (ref 1–5.1)
LYMPHOCYTES RELATIVE PERCENT: 17.4 %
MCH RBC QN AUTO: 27.3 PG (ref 26–34)
MCHC RBC AUTO-ENTMCNC: 31.7 G/DL (ref 31–36)
MCV RBC AUTO: 86.2 FL (ref 80–100)
MICROSCOPIC EXAMINATION: YES
MONOCYTES ABSOLUTE: 0.5 K/UL (ref 0–1.3)
MONOCYTES RELATIVE PERCENT: 6.5 %
NEUTROPHILS ABSOLUTE: 5.9 K/UL (ref 1.7–7.7)
NEUTROPHILS RELATIVE PERCENT: 73.5 %
NITRITE, URINE: NEGATIVE
PDW BLD-RTO: 18.1 % (ref 12.4–15.4)
PERFORMED ON: ABNORMAL
PH UA: 5.5 (ref 5–8)
PLATELET # BLD: 252 K/UL (ref 135–450)
PMV BLD AUTO: 9.5 FL (ref 5–10.5)
POTASSIUM SERPL-SCNC: 4.6 MMOL/L (ref 3.5–5.1)
PROTEIN UA: 30 MG/DL
RBC # BLD: 3.88 M/UL (ref 4–5.2)
RBC UA: 3 /HPF (ref 0–4)
SODIUM BLD-SCNC: 139 MMOL/L (ref 136–145)
SPECIFIC GRAVITY UA: 1.02 (ref 1–1.03)
TOTAL PROTEIN: 6.9 G/DL (ref 6.4–8.2)
TROPONIN: <0.01 NG/ML
URINE REFLEX TO CULTURE: YES
URINE TYPE: ABNORMAL
UROBILINOGEN, URINE: 0.2 E.U./DL
WBC # BLD: 8 K/UL (ref 4–11)
WBC UA: 46 /HPF (ref 0–5)

## 2020-09-02 PROCEDURE — 85025 COMPLETE CBC W/AUTO DIFF WBC: CPT

## 2020-09-02 PROCEDURE — 6360000002 HC RX W HCPCS: Performed by: EMERGENCY MEDICINE

## 2020-09-02 PROCEDURE — 99291 CRITICAL CARE FIRST HOUR: CPT

## 2020-09-02 PROCEDURE — 96366 THER/PROPH/DIAG IV INF ADDON: CPT

## 2020-09-02 PROCEDURE — 96361 HYDRATE IV INFUSION ADD-ON: CPT

## 2020-09-02 PROCEDURE — 96375 TX/PRO/DX INJ NEW DRUG ADDON: CPT

## 2020-09-02 PROCEDURE — 71045 X-RAY EXAM CHEST 1 VIEW: CPT

## 2020-09-02 PROCEDURE — 2580000003 HC RX 258: Performed by: EMERGENCY MEDICINE

## 2020-09-02 PROCEDURE — 93005 ELECTROCARDIOGRAM TRACING: CPT | Performed by: PHYSICIAN ASSISTANT

## 2020-09-02 PROCEDURE — 2580000003 HC RX 258: Performed by: PHYSICIAN ASSISTANT

## 2020-09-02 PROCEDURE — 87086 URINE CULTURE/COLONY COUNT: CPT

## 2020-09-02 PROCEDURE — 96365 THER/PROPH/DIAG IV INF INIT: CPT

## 2020-09-02 PROCEDURE — 2580000003 HC RX 258: Performed by: INTERNAL MEDICINE

## 2020-09-02 PROCEDURE — 81001 URINALYSIS AUTO W/SCOPE: CPT

## 2020-09-02 PROCEDURE — 80053 COMPREHEN METABOLIC PANEL: CPT

## 2020-09-02 PROCEDURE — 84484 ASSAY OF TROPONIN QUANT: CPT

## 2020-09-02 PROCEDURE — 1200000000 HC SEMI PRIVATE

## 2020-09-02 RX ORDER — SODIUM CHLORIDE 0.9 % (FLUSH) 0.9 %
10 SYRINGE (ML) INJECTION EVERY 12 HOURS SCHEDULED
Status: DISCONTINUED | OUTPATIENT
Start: 2020-09-02 | End: 2020-09-06 | Stop reason: HOSPADM

## 2020-09-02 RX ORDER — DEXTROSE AND SODIUM CHLORIDE 5; .45 G/100ML; G/100ML
INJECTION, SOLUTION INTRAVENOUS CONTINUOUS
Status: DISCONTINUED | OUTPATIENT
Start: 2020-09-02 | End: 2020-09-03

## 2020-09-02 RX ORDER — MAGNESIUM SULFATE 1 G/100ML
1 INJECTION INTRAVENOUS PRN
Status: DISCONTINUED | OUTPATIENT
Start: 2020-09-02 | End: 2020-09-06 | Stop reason: HOSPADM

## 2020-09-02 RX ORDER — DEXTROSE AND SODIUM CHLORIDE 5; .9 G/100ML; G/100ML
INJECTION, SOLUTION INTRAVENOUS CONTINUOUS
Status: DISCONTINUED | OUTPATIENT
Start: 2020-09-02 | End: 2020-09-03

## 2020-09-02 RX ORDER — 0.9 % SODIUM CHLORIDE 0.9 %
1000 INTRAVENOUS SOLUTION INTRAVENOUS ONCE
Status: COMPLETED | OUTPATIENT
Start: 2020-09-02 | End: 2020-09-02

## 2020-09-02 RX ORDER — SODIUM CHLORIDE 0.9 % (FLUSH) 0.9 %
10 SYRINGE (ML) INJECTION PRN
Status: DISCONTINUED | OUTPATIENT
Start: 2020-09-02 | End: 2020-09-06 | Stop reason: HOSPADM

## 2020-09-02 RX ORDER — POTASSIUM CHLORIDE 7.45 MG/ML
10 INJECTION INTRAVENOUS PRN
Status: DISCONTINUED | OUTPATIENT
Start: 2020-09-02 | End: 2020-09-06 | Stop reason: HOSPADM

## 2020-09-02 RX ORDER — ACETAMINOPHEN 650 MG/1
650 SUPPOSITORY RECTAL EVERY 6 HOURS PRN
Status: DISCONTINUED | OUTPATIENT
Start: 2020-09-02 | End: 2020-09-02 | Stop reason: SDUPTHER

## 2020-09-02 RX ORDER — ACETAMINOPHEN 325 MG/1
650 TABLET ORAL EVERY 6 HOURS PRN
Status: DISCONTINUED | OUTPATIENT
Start: 2020-09-02 | End: 2020-09-02 | Stop reason: SDUPTHER

## 2020-09-02 RX ORDER — DEXTROSE MONOHYDRATE 50 MG/ML
INJECTION, SOLUTION INTRAVENOUS CONTINUOUS
Status: DISCONTINUED | OUTPATIENT
Start: 2020-09-02 | End: 2020-09-02

## 2020-09-02 RX ORDER — ONDANSETRON 2 MG/ML
4 INJECTION INTRAMUSCULAR; INTRAVENOUS EVERY 6 HOURS PRN
Status: DISCONTINUED | OUTPATIENT
Start: 2020-09-02 | End: 2020-09-06 | Stop reason: HOSPADM

## 2020-09-02 RX ORDER — POTASSIUM CHLORIDE 20 MEQ/1
40 TABLET, EXTENDED RELEASE ORAL PRN
Status: DISCONTINUED | OUTPATIENT
Start: 2020-09-02 | End: 2020-09-06 | Stop reason: HOSPADM

## 2020-09-02 RX ORDER — ACETAMINOPHEN 650 MG/1
650 SUPPOSITORY RECTAL EVERY 6 HOURS PRN
Status: DISCONTINUED | OUTPATIENT
Start: 2020-09-02 | End: 2020-09-06 | Stop reason: HOSPADM

## 2020-09-02 RX ORDER — POLYETHYLENE GLYCOL 3350 17 G/17G
17 POWDER, FOR SOLUTION ORAL DAILY PRN
Status: DISCONTINUED | OUTPATIENT
Start: 2020-09-02 | End: 2020-09-06 | Stop reason: HOSPADM

## 2020-09-02 RX ORDER — CEFUROXIME AXETIL 250 MG/1
250 TABLET ORAL 2 TIMES DAILY
Qty: 20 TABLET | Refills: 0 | Status: SHIPPED | OUTPATIENT
Start: 2020-09-02 | End: 2020-09-02 | Stop reason: ALTCHOICE

## 2020-09-02 RX ORDER — ACETAMINOPHEN 325 MG/1
650 TABLET ORAL EVERY 6 HOURS PRN
Status: DISCONTINUED | OUTPATIENT
Start: 2020-09-02 | End: 2020-09-06 | Stop reason: HOSPADM

## 2020-09-02 RX ORDER — PROMETHAZINE HYDROCHLORIDE 25 MG/1
12.5 TABLET ORAL EVERY 6 HOURS PRN
Status: DISCONTINUED | OUTPATIENT
Start: 2020-09-02 | End: 2020-09-06 | Stop reason: HOSPADM

## 2020-09-02 RX ADMIN — DEXTROSE AND SODIUM CHLORIDE: 5; 450 INJECTION, SOLUTION INTRAVENOUS at 23:13

## 2020-09-02 RX ADMIN — SODIUM CHLORIDE 1000 ML: 9 INJECTION, SOLUTION INTRAVENOUS at 17:54

## 2020-09-02 RX ADMIN — Medication 10 ML: at 23:13

## 2020-09-02 RX ADMIN — Medication 1 G: at 18:57

## 2020-09-02 RX ADMIN — DEXTROSE AND SODIUM CHLORIDE: 5; 900 INJECTION, SOLUTION INTRAVENOUS at 19:28

## 2020-09-02 ASSESSMENT — ENCOUNTER SYMPTOMS
WHEEZING: 0
COUGH: 0
SHORTNESS OF BREATH: 0
DIARRHEA: 0
ABDOMINAL PAIN: 0
RHINORRHEA: 0
NAUSEA: 0
VOMITING: 0

## 2020-09-02 ASSESSMENT — PAIN SCALES - GENERAL: PAINLEVEL_OUTOF10: 0

## 2020-09-02 NOTE — ED NOTES
Pt comes in today by [facility] from home due to low BS of 41. Squad gave D50 for low BS and rechecked of 212. Pt alert and oriented during triage. Pt states she was feeling confused, diaphoretic, weak and dizzy earlier today. Denies any blurry vision, syncope. Pt hooked up to monitor and VSS. Pt is alert and oriented. Son at bedside. Will continue to monitor.       Celestine Crump RN  09/02/20 [id number]

## 2020-09-02 NOTE — ED PROVIDER NOTES
[facility]        Pt Name: Lorraine Dillard  MRN: [id number]  [place] 1940  Date of evaluation: 9/2/2020  Provider: Jason Colin PA-C  PCP: Eliud Palma MD     I have seen and evaluated this patient with my supervising physician Genesis Li MD.    [facility]       Chief Complaint   Patient presents with    Hypoglycemia     comes in per [facility] from home due to low BS of 41. squad gave D50 and  after. pt alert and oriented        HISTORY OF PRESENT ILLNESS   (Location, Timing/Onset, Context/Setting, Quality, Duration, Modifying Factors, Severity, Associated Signs and Symptoms)  Note limiting factors. Lorraine Dillard is a [de-identified] y.o. female who presents for evaluation of hypoglycemia. Patient states that she started feeling very poorly, confused, diaphoretic, weak and dizzy. Blood glucose was 46 upon EMS arrival.  She was given D50 and oral glucose and sugar was 212 after arrival.  Patient states that she feels much better. She denies any dizziness/lightheadedness, weakness, visual disturbances or syncope. No chest pain or shortness of breath. No abdominal pain nausea vomiting or diarrhea. She takes metformin and insulin. She has no other antihyperglycemic medications. She does have history of similar symptoms. No other complaints or concerns at this time. Nursing Notes were all reviewed and agreed with or any disagreements were addressed in the HPI. REVIEW OF SYSTEMS    (2-9 systems for level 4, 10 or more for level 5)     Review of Systems   Constitutional: Positive for diaphoresis. Negative for appetite change, chills and fever. HENT: Negative for congestion and rhinorrhea. Eyes: Negative for visual disturbance. Respiratory: Negative for cough, shortness of breath and wheezing. Cardiovascular: Negative for chest pain.    Gastrointestinal: Negative for abdominal pain, diarrhea, nausea and vomiting. Genitourinary: Negative for difficulty urinating, dysuria and hematuria. Musculoskeletal: Negative for neck pain and neck stiffness. Skin: Negative for rash. Neurological: Positive for dizziness, weakness and light-headedness. Negative for headaches. Psychiatric/Behavioral: Positive for confusion. Positives and Pertinent negatives as per HPI. Except as noted above in the ROS, all other systems were reviewed and negative. PAST MEDICAL HISTORY     Past Medical History:   Diagnosis Date    Cataract     Cystocele without mention of uterine prolapse     Depression     DM type 2 (diabetes mellitus, type 2) (HCC)     Dysuria     Generalized osteoarthritis of multiple sites     HTN (hypertension)     Hyperlipidemia     Menopause     Nephropathy, diabetic ([address]     Osteopenia     Stroke [facility] [address]          SURGICAL HISTORY     Past Surgical History:   Procedure Laterality Date    CARPAL TUNNEL RELEASE  2000    Right     CATARACT REMOVAL  2006    Bilateral Cataracts         CURRENTMEDICATIONS       Previous Medications    AMLODIPINE (NORVASC) 5 MG TABLET    Take 1 tablet by mouth daily    ASPIRIN 325 MG TABLET    Take 1 tablet by mouth daily. ATORVASTATIN (LIPITOR) 40 MG TABLET    TAKE 1 TABLET BY MOUTH ONCE A DAY    BLOOD GLUCOSE MONITOR KIT AND SUPPLIES    Test 2 times a day & as needed for symptoms of irregular blood glucose. BLOOD GLUCOSE MONITOR STRIPS    Test 4 times a day & as needed for symptoms of irregular blood glucose.     BLOOD GLUCOSE MONITORING SUPPL (ACCU-CHEK WALTER PLUS) W/DEVICE KIT    USE TO TEST BLOOD SUGAR THREE TIMES DAILY Dx Code: E11.21    CARVEDILOL (COREG) 25 MG TABLET    TAKE 1 TABLET BY MOUTH TWO  TIMES DAILY WITH MEALS    CLOPIDOGREL (PLAVIX) 75 MG TABLET    TAKE 1 TABLET BY MOUTH  DAILY    FLUOXETINE (PROZAC) 40 MG CAPSULE    TAKE 1 CAPSULE BY MOUTH  ONCE A DAY    INSULIN LISPRO PROTAMINE & LISPRO (HUMALOG MIX 75/25 KWIKPEN) (75-25) 100 UNIT PER ML SUPN INJECTION PEN    50 units in AM, 16 units with lunch, 10 units before dinner    INSULIN SYRINGE-NEEDLE U-100 31G X 5/16\" 1 ML MISC    1 each by Does not apply route 2 times daily    LANCETS MISC    1 each by Does not apply route 4 times daily    METFORMIN (GLUCOPHAGE) 1000 MG TABLET    TAKE 1 TABLET BY MOUTH TWO  TIMES A DAY WITH MEALS    OXYBUTYNIN (DITROPAN-XL) 10 MG EXTENDED RELEASE TABLET    Take 1 tablet by mouth daily    PANTOPRAZOLE (PROTONIX) 40 MG TABLET    Take 1 tablet by mouth daily    QUINAPRIL (ACCUPRIL) 40 MG TABLET    TAKE 1 TABLET BY MOUTH ONCE A DAY    TRUEPLUS PEN NEEDLES 31G X 6 MM MISC    USE FOR INSULIN PENS 4 TIMES DAILY         ALLERGIES     Patient has no known allergies. FAMILYHISTORY       Family History   Problem Relation Age of Onset    Stroke Father     Mental Illness Brother         schizophrenia    Cancer Brother         Lung Cancer          SOCIAL HISTORY       Social History     Tobacco Use    Smoking status: Former Smoker     Packs/day: 0.50     Years: 15.00     Pack years: 7.50     Last attempt to quit: 2002     Years since quittin.6    Smokeless tobacco: Never Used    Tobacco comment: congrats   Substance Use Topics    Alcohol use: No    Drug use: No       SCREENINGS             PHYSICAL EXAM    (up to 7 for level 4, 8 or more for level 5)     ED Triage Vitals [20 1606]   BP Temp Temp src Pulse Resp SpO2 Height Weight   (!) 120/56 -- -- (!) 48 18 99 % 5' 3\" (1.6 m) 190 lb (86.2 kg)       Physical Exam  Vitals signs and nursing note reviewed. Constitutional:       General: She is not in acute distress. Appearance: She is well-developed. She is not ill-appearing, toxic-appearing or diaphoretic. HENT:      Head: Normocephalic and atraumatic. Right Ear: External ear normal.      Left Ear: External ear normal.      Nose: Nose normal.   Eyes:      General:         Right eye: No discharge. Left eye: No discharge.    Neck: Musculoskeletal: Normal range of motion and neck supple. Cardiovascular:      Rate and Rhythm: Normal rate and regular rhythm. Heart sounds: Normal heart sounds. Pulmonary:      Effort: Pulmonary effort is normal. No respiratory distress. Breath sounds: Normal breath sounds. Chest:      Chest wall: No tenderness. Abdominal:      General: There is no distension. Palpations: Abdomen is soft. Tenderness: There is no abdominal tenderness. Musculoskeletal: Normal range of motion. Skin:     General: Skin is warm and dry. Neurological:      Mental Status: She is alert and oriented to person, place, and time. Mental status is at baseline. GCS: GCS eye subscore is 4. GCS verbal subscore is 5. GCS motor subscore is 6. Cranial Nerves: Cranial nerves are intact. Sensory: Sensation is intact. Motor: Motor function is intact. Coordination: Coordination is intact.    Psychiatric:         Behavior: Behavior normal.         DIAGNOSTIC RESULTS   LABS:    Labs Reviewed   CBC WITH AUTO DIFFERENTIAL - Abnormal; Notable for the following components:       Result Value    RBC 3.88 (*)     Hemoglobin 10.6 (*)     Hematocrit 33.4 (*)     RDW 18.1 (*)     All other components within normal limits    Narrative:     Performed at:  [facility] [address], [place], [address]   Phone [phone]   COMPREHENSIVE METABOLIC PANEL - Abnormal; Notable for the following components:    Glucose 128 (*)     BUN 34 (*)     CREATININE 1.9 (*)     GFR Non- 25 (*)     GFR  31 (*)     ALT <5 (*)     AST 13 (*)     All other components within normal limits    Narrative:     Performed at:  [facility]  [address] [facility], [facility] [place]   Phone [phone]   URINE RT REFLEX TO CULTURE - Abnormal; Notable for the following components:    Glucose, Ur 100 (*)     Protein, UA 30 (*)     Leukocyte Esterase, Urine MODERATE (*)     All other components within normal limits    Narrative:     Performed at:  [facility] [address],  [place], [place]   Phone [phone]   MICROSCOPIC URINALYSIS - Abnormal; Notable for the following components:    WBC, UA 46 (*)     All other components within normal limits    Narrative:     Performed at:  [facility] [address],  [place], [place]   Phone [phone]   POCT GLUCOSE - Abnormal; Notable for the following components:    POC Glucose 54 (*)     All other components within normal limits    Narrative:     Performed at:  [facility] [address],  [place], [place]   Phone [phone]   CULTURE, URINE   TROPONIN    Narrative:     Performed at:  [facility] [address],  [place], [place]   Phone [phone]   POCT GLUCOSE       All other labs were within normal range or not returned as of this dictation. EKG: All EKG's are interpreted by the Emergency Department Physician in the absence of a cardiologist.  Please see their note for interpretation of EKG. RADIOLOGY:   Non-plain film images such as CT, Ultrasound and MRI are read by the radiologist. Plain radiographic images are visualized and preliminarily interpreted by the ED Provider with the below findings:        Interpretation per the Radiologist below, if available at the time of this note:    XR CHEST PORTABLE   Final Result   Unremarkable portable chest radiograph. No results found.         PROCEDURES   Unless otherwise noted below, none     Procedures    CRITICAL CARE TIME   N/A    CONSULTS:  IP CONSULT TO HOSPITALIST      EMERGENCY DEPARTMENT COURSE and DIFFERENTIAL DIAGNOSIS/MDM:   Vitals:    Vitals:    09/02/20 1606 09/02/20 1840   BP: (!) 120/56    Pulse: (!) 48    Resp: 18    Temp:  97.7 °F (36.5 °C)   TempSrc:  Oral   SpO2: 99%    Weight: 190 lb (86.2 kg)    Height: 5' 3\" (1.6 m)        Patient was given the following medications:  Medications   dextrose 5 % solution (has no administration in time range)   0.9 % sodium chloride bolus (1,000 mLs Intravenous New Bag 9/2/20 3474)   cefTRIAXone (ROCEPHIN) 1 g in sterile water 10 mL IV syringe (1 g Intravenous Given 9/2/20 8347)           Patient presents for evaluation of episode of hypoglycemia. On exam, she is resting comfortably in bed in no acute distress and nontoxic. Vitals are stable and she is afebrile. Lungs are clear to auscultation bilaterally, chest is nontender and abdomen is benign. She is alert and oriented x3. GCS 15. Cranial nerves II through XII are intact. She is moving all extremities without difficulty or focal neurologic deficit. Please see attending note for EKG interpretation. CBC and CMP are remarkable for a mild acute on chronic kidney disease with a creatinine of 1.9. She was given fluid resuscitation for correction of this in the ED. Glucose was 128. Troponin is negative. Chest x-ray shows no acute process. Urinalysis positive for moderate leukocytes and 46 white blood cells with 1 epithelial cell. She was given dose of Rocephin in the ED. Repeat glucose 54. She was started on D5 drip and will be admitted for refractory hypoglycemia. Hospitals will resume care the patient at this time. Patient was informed and agreeable. Stable for admission. Critical Care  There was a high probability of life-threatening clinical deterioration in the patient's condition requiring my urgent intervention. Total critical care time with the patient was 33 minutes excluding separately reportable procedures. Critical care required due to patients presentation, comorbidities and concern for acute life-threatening disease and decompensation including refractory severe hyperglycemia. FINAL IMPRESSION      1. Hypoglycemia    2.  Acute cystitis without hematuria          DISPOSITION/PLAN   DISPOSITION Decision To Admit 09/02/2020 07:08:35 PM      PATIENT REFERREDTO:  No follow-up provider specified.     DISCHARGE MEDICATIONS:  New Prescriptions    CEFUROXIME (CEFTIN) 250 MG TABLET    Take 1 tablet by mouth 2 times daily for 10 days       DISCONTINUED MEDICATIONS:  Discontinued Medications    No medications on file              (Please note that portions of this note were completed with a voice recognition program.  Efforts were made to edit the dictations but occasionally words are mis-transcribed.)    Ruth Rizvi PA-C (electronically signed)           Hannah Le PA-C  09/02/20 [address] [place], [place]  09/02/20 9986

## 2020-09-02 NOTE — ED PROVIDER NOTES
I independently performed a history and physical on [facility]. All diagnostic, treatment, and disposition decisions were made by myself in conjunction with the advanced practice provider. I have participated in the medical decision making and directed the treatment plan and disposition of the patient. For further details of [facility] emergency department encounter, please see the advanced practice provider's documentation. CHIEF COMPLAINT  Chief Complaint   Patient presents with    Hypoglycemia     comes in per [facility] from home due to low BS of 41. squad gave D50 and  after. pt alert and oriented      Briefly, [facility] is a [de-identified] y.o. female  who presents to the ED complaining of malaise, fatigue, and some diaphoresis at home, found to have per EMS sugar in the 40's and was given D50 and PO glucose and subsequently felt better. Was apparently lethargic on the couch when originally found per son. Pt tells me she generally feels better/normal now. No chest pain or fevers. Denies any potential accidental or intentional misuse of her DM medications. No abd pain but has urinary frequency. FOCUSED PHYSICAL EXAMINATION  BP (!) 120/56   Pulse (!) 48   Temp 97.7 °F (36.5 °C) (Oral)   Resp 18   Ht 5' 3\" (1.6 m)   Wt 190 lb (86.2 kg)   LMP  (LMP Unknown)   SpO2 99%   BMI 33.66 kg/m²    Focused physical examination notable for no acute distress, well-appearing, well-nourished, normal speech and mentation without obvious facial droop, no obvious rash. No obvious cranial nerve deficits on my initial exam. Bradycardia, reg rhythm, CTAB, abd soft NTND.     The 12 lead EKG was interpreted by me as follows:  Rate: bradycardia with a rate of 49  Rhythm: sinus  Axis: normal  Intervals: normal VA, narrow QRS, normal QTc  ST segments: no ST elevations or depressions  T waves: no abnormal inversions  Non-specific T wave changes: not present  Prior EKG comparison: EKG dated 8/22/18 is not significantly different    MDM:  ED course was notable for hypoglycemia, on recheck here this recurred. With UTI, mild TOMASZ and no misuse of her insulin, etiology is unclear at this time. D5.9 MIVF initiated and rocephin for UTI. Not septic. Admit for ongoing sugar monitoring. Original intention was hopefully to discharge but given the recurrence of hypoglycemia after monitoring and recheck, this will need to be continually monitored overnight. During the patient's ED course, the patient was given:  Medications   dextrose 5 % and 0.9 % sodium chloride infusion (has no administration in time range)   0.9 % sodium chloride bolus (1,000 mLs Intravenous New Bag 9/2/20 9317)   cefTRIAXone (ROCEPHIN) 1 g in sterile water 10 mL IV syringe (1 g Intravenous Given 9/2/20 5173)        CLINICAL IMPRESSION  1. Hypoglycemia    2. Acute cystitis without hematuria    3. TOMASZ (acute kidney injury) ([facility] [address]        DISPOSITION  Reji Lane was admitted in fair condition. The plan is to admit to the hospital at this time under the hospitalist service. Hospitalist accepted the patient and will take over the patient's care. The total critical care time spent while evaluating and treating this patient was at least 35 minutes. This excludes time spent doing separately billable procedures. This includes time at the bedside, data interpretation, medication management, obtaining critical history from collateral sources if the patient is unable to provide it directly, and physician consultation. Specifics of interventions taken and potentially life-threatening diagnostic considerations are listed above in the medical decision making. This chart was created using Dragon dictation software. Efforts were made by me to ensure accuracy, however some errors may be present due to limitations of this technology.             Ken Sorto MD  09/02/20 Preet [place]

## 2020-09-02 NOTE — ED NOTES
Bed: 30  Expected date:   Expected time:   Means of arrival:   Comments:  M25     April Christian RN  09/02/20 1600

## 2020-09-03 LAB
A/G RATIO: 1.9 (ref 1.1–2.2)
ALBUMIN SERPL-MCNC: 3.8 G/DL (ref 3.4–5)
ALP BLD-CCNC: 50 U/L (ref 40–129)
ALT SERPL-CCNC: <5 U/L (ref 10–40)
ANION GAP SERPL CALCULATED.3IONS-SCNC: 11 MMOL/L (ref 3–16)
AST SERPL-CCNC: 11 U/L (ref 15–37)
BASOPHILS ABSOLUTE: 0.1 K/UL (ref 0–0.2)
BASOPHILS RELATIVE PERCENT: 0.9 %
BILIRUB SERPL-MCNC: 0.4 MG/DL (ref 0–1)
BUN BLDV-MCNC: 26 MG/DL (ref 7–20)
CALCIUM IONIZED: 1.14 MMOL/L (ref 1.12–1.32)
CALCIUM SERPL-MCNC: 8.7 MG/DL (ref 8.3–10.6)
CHLORIDE BLD-SCNC: 108 MMOL/L (ref 99–110)
CHLORIDE URINE RANDOM: 115 MMOL/L
CO2: 21 MMOL/L (ref 21–32)
CREAT SERPL-MCNC: 1.5 MG/DL (ref 0.6–1.2)
CREATININE URINE: 68.3 MG/DL (ref 28–259)
EKG ATRIAL RATE: 49 BPM
EKG DIAGNOSIS: NORMAL
EKG P AXIS: 51 DEGREES
EKG P-R INTERVAL: 178 MS
EKG Q-T INTERVAL: 508 MS
EKG QRS DURATION: 90 MS
EKG QTC CALCULATION (BAZETT): 458 MS
EKG R AXIS: 33 DEGREES
EKG T AXIS: 51 DEGREES
EKG VENTRICULAR RATE: 49 BPM
EOSINOPHILS ABSOLUTE: 0.1 K/UL (ref 0–0.6)
EOSINOPHILS RELATIVE PERCENT: 1.3 %
ESTIMATED AVERAGE GLUCOSE: 200.1 MG/DL
GFR AFRICAN AMERICAN: 40
GFR NON-AFRICAN AMERICAN: 33
GLOBULIN: 2 G/DL
GLUCOSE BLD-MCNC: 203 MG/DL (ref 70–99)
GLUCOSE BLD-MCNC: 229 MG/DL (ref 70–99)
GLUCOSE BLD-MCNC: 254 MG/DL (ref 70–99)
GLUCOSE BLD-MCNC: 257 MG/DL (ref 70–99)
GLUCOSE BLD-MCNC: 271 MG/DL (ref 70–99)
GLUCOSE BLD-MCNC: 309 MG/DL (ref 70–99)
HBA1C MFR BLD: 8.6 %
HCT VFR BLD CALC: 30.1 % (ref 36–48)
HEMOGLOBIN: 9.7 G/DL (ref 12–16)
LACTIC ACID: 1.5 MMOL/L (ref 0.4–2)
LYMPHOCYTES ABSOLUTE: 1.5 K/UL (ref 1–5.1)
LYMPHOCYTES RELATIVE PERCENT: 24.4 %
MCH RBC QN AUTO: 27.1 PG (ref 26–34)
MCHC RBC AUTO-ENTMCNC: 32.3 G/DL (ref 31–36)
MCV RBC AUTO: 84 FL (ref 80–100)
MONOCYTES ABSOLUTE: 0.6 K/UL (ref 0–1.3)
MONOCYTES RELATIVE PERCENT: 8.8 %
NEUTROPHILS ABSOLUTE: 4 K/UL (ref 1.7–7.7)
NEUTROPHILS RELATIVE PERCENT: 64.6 %
PDW BLD-RTO: 17.6 % (ref 12.4–15.4)
PERFORMED ON: ABNORMAL
PH VENOUS: 7.39 (ref 7.35–7.45)
PHOSPHORUS: 2.8 MG/DL (ref 2.5–4.9)
PLATELET # BLD: 231 K/UL (ref 135–450)
PMV BLD AUTO: 9.8 FL (ref 5–10.5)
POTASSIUM REFLEX MAGNESIUM: 4.2 MMOL/L (ref 3.5–5.1)
POTASSIUM, UR: 22.5 MMOL/L
RBC # BLD: 3.58 M/UL (ref 4–5.2)
SODIUM BLD-SCNC: 140 MMOL/L (ref 136–145)
SODIUM URINE: 111 MMOL/L
T4 FREE: 1.3 NG/DL (ref 0.9–1.8)
TOTAL PROTEIN: 5.8 G/DL (ref 6.4–8.2)
TSH SERPL DL<=0.05 MIU/L-ACNC: 2.02 UIU/ML (ref 0.27–4.2)
UREA NITROGEN, UR: 496 MG/DL (ref 800–1666)
URINE CULTURE, ROUTINE: NORMAL
WBC # BLD: 6.3 K/UL (ref 4–11)

## 2020-09-03 PROCEDURE — 80053 COMPREHEN METABOLIC PANEL: CPT

## 2020-09-03 PROCEDURE — 85025 COMPLETE CBC W/AUTO DIFF WBC: CPT

## 2020-09-03 PROCEDURE — 82330 ASSAY OF CALCIUM: CPT

## 2020-09-03 PROCEDURE — 2580000003 HC RX 258: Performed by: INTERNAL MEDICINE

## 2020-09-03 PROCEDURE — 1200000000 HC SEMI PRIVATE

## 2020-09-03 PROCEDURE — 36415 COLL VENOUS BLD VENIPUNCTURE: CPT

## 2020-09-03 PROCEDURE — 84133 ASSAY OF URINE POTASSIUM: CPT

## 2020-09-03 PROCEDURE — 84100 ASSAY OF PHOSPHORUS: CPT

## 2020-09-03 PROCEDURE — 99222 1ST HOSP IP/OBS MODERATE 55: CPT | Performed by: INTERNAL MEDICINE

## 2020-09-03 PROCEDURE — 84439 ASSAY OF FREE THYROXINE: CPT

## 2020-09-03 PROCEDURE — 93010 ELECTROCARDIOGRAM REPORT: CPT | Performed by: INTERNAL MEDICINE

## 2020-09-03 PROCEDURE — 82436 ASSAY OF URINE CHLORIDE: CPT

## 2020-09-03 PROCEDURE — 6370000000 HC RX 637 (ALT 250 FOR IP): Performed by: HOSPITALIST

## 2020-09-03 PROCEDURE — 82570 ASSAY OF URINE CREATININE: CPT

## 2020-09-03 PROCEDURE — 6370000000 HC RX 637 (ALT 250 FOR IP): Performed by: INTERNAL MEDICINE

## 2020-09-03 PROCEDURE — 6360000002 HC RX W HCPCS: Performed by: INTERNAL MEDICINE

## 2020-09-03 PROCEDURE — 84300 ASSAY OF URINE SODIUM: CPT

## 2020-09-03 PROCEDURE — 84443 ASSAY THYROID STIM HORMONE: CPT

## 2020-09-03 PROCEDURE — 6360000002 HC RX W HCPCS: Performed by: HOSPITALIST

## 2020-09-03 PROCEDURE — 83605 ASSAY OF LACTIC ACID: CPT

## 2020-09-03 PROCEDURE — 84540 ASSAY OF URINE/UREA-N: CPT

## 2020-09-03 PROCEDURE — 83036 HEMOGLOBIN GLYCOSYLATED A1C: CPT

## 2020-09-03 RX ORDER — HYDRALAZINE HYDROCHLORIDE 20 MG/ML
10 INJECTION INTRAMUSCULAR; INTRAVENOUS EVERY 8 HOURS
Status: DISCONTINUED | OUTPATIENT
Start: 2020-09-03 | End: 2020-09-03

## 2020-09-03 RX ORDER — HYDRALAZINE HYDROCHLORIDE 20 MG/ML
20 INJECTION INTRAMUSCULAR; INTRAVENOUS EVERY 8 HOURS
Status: DISCONTINUED | OUTPATIENT
Start: 2020-09-03 | End: 2020-09-05

## 2020-09-03 RX ORDER — DEXTROSE MONOHYDRATE 25 G/50ML
12.5 INJECTION, SOLUTION INTRAVENOUS PRN
Status: DISCONTINUED | OUTPATIENT
Start: 2020-09-03 | End: 2020-09-06 | Stop reason: HOSPADM

## 2020-09-03 RX ORDER — SODIUM CHLORIDE 9 MG/ML
INJECTION, SOLUTION INTRAVENOUS CONTINUOUS
Status: DISCONTINUED | OUTPATIENT
Start: 2020-09-03 | End: 2020-09-05

## 2020-09-03 RX ORDER — INSULIN LISPRO 100 [IU]/ML
0-6 INJECTION, SOLUTION INTRAVENOUS; SUBCUTANEOUS EVERY 4 HOURS
Status: DISCONTINUED | OUTPATIENT
Start: 2020-09-03 | End: 2020-09-03

## 2020-09-03 RX ORDER — INSULIN LISPRO 100 [IU]/ML
0-6 INJECTION, SOLUTION INTRAVENOUS; SUBCUTANEOUS
Status: DISCONTINUED | OUTPATIENT
Start: 2020-09-03 | End: 2020-09-04

## 2020-09-03 RX ORDER — AMLODIPINE BESYLATE 5 MG/1
5 TABLET ORAL DAILY
Status: DISCONTINUED | OUTPATIENT
Start: 2020-09-03 | End: 2020-09-04

## 2020-09-03 RX ORDER — DEXTROSE MONOHYDRATE 50 MG/ML
100 INJECTION, SOLUTION INTRAVENOUS PRN
Status: DISCONTINUED | OUTPATIENT
Start: 2020-09-03 | End: 2020-09-06 | Stop reason: HOSPADM

## 2020-09-03 RX ORDER — HYDRALAZINE HYDROCHLORIDE 20 MG/ML
10 INJECTION INTRAMUSCULAR; INTRAVENOUS EVERY 6 HOURS PRN
Status: DISCONTINUED | OUTPATIENT
Start: 2020-09-02 | End: 2020-09-03

## 2020-09-03 RX ORDER — NICOTINE POLACRILEX 4 MG
15 LOZENGE BUCCAL PRN
Status: DISCONTINUED | OUTPATIENT
Start: 2020-09-03 | End: 2020-09-06 | Stop reason: HOSPADM

## 2020-09-03 RX ORDER — ATORVASTATIN CALCIUM 40 MG/1
40 TABLET, FILM COATED ORAL NIGHTLY
Status: DISCONTINUED | OUTPATIENT
Start: 2020-09-03 | End: 2020-09-06 | Stop reason: HOSPADM

## 2020-09-03 RX ORDER — FLUOXETINE HYDROCHLORIDE 20 MG/1
40 CAPSULE ORAL DAILY
Status: DISCONTINUED | OUTPATIENT
Start: 2020-09-03 | End: 2020-09-06 | Stop reason: HOSPADM

## 2020-09-03 RX ORDER — CARVEDILOL 25 MG/1
25 TABLET ORAL 2 TIMES DAILY
Status: DISCONTINUED | OUTPATIENT
Start: 2020-09-03 | End: 2020-09-03

## 2020-09-03 RX ORDER — CLOPIDOGREL BISULFATE 75 MG/1
75 TABLET ORAL DAILY
Status: DISCONTINUED | OUTPATIENT
Start: 2020-09-03 | End: 2020-09-06 | Stop reason: HOSPADM

## 2020-09-03 RX ADMIN — INSULIN LISPRO 3 UNITS: 100 INJECTION, SOLUTION INTRAVENOUS; SUBCUTANEOUS at 04:58

## 2020-09-03 RX ADMIN — DESMOPRESSIN ACETATE 40 MG: 0.2 TABLET ORAL at 00:54

## 2020-09-03 RX ADMIN — FLUOXETINE 40 MG: 20 CAPSULE ORAL at 09:16

## 2020-09-03 RX ADMIN — CLOPIDOGREL BISULFATE 75 MG: 75 TABLET ORAL at 09:16

## 2020-09-03 RX ADMIN — SODIUM CHLORIDE: 9 INJECTION, SOLUTION INTRAVENOUS at 13:18

## 2020-09-03 RX ADMIN — Medication 10 ML: at 09:16

## 2020-09-03 RX ADMIN — INSULIN LISPRO 4 UNITS: 100 INJECTION, SOLUTION INTRAVENOUS; SUBCUTANEOUS at 16:32

## 2020-09-03 RX ADMIN — DESMOPRESSIN ACETATE 40 MG: 0.2 TABLET ORAL at 22:24

## 2020-09-03 RX ADMIN — Medication 10 ML: at 22:25

## 2020-09-03 RX ADMIN — HYDRALAZINE HYDROCHLORIDE 20 MG: 20 INJECTION INTRAMUSCULAR; INTRAVENOUS at 16:32

## 2020-09-03 RX ADMIN — HYDRALAZINE HYDROCHLORIDE 10 MG: 20 INJECTION INTRAMUSCULAR; INTRAVENOUS at 00:55

## 2020-09-03 RX ADMIN — INSULIN LISPRO 2 UNITS: 100 INJECTION, SOLUTION INTRAVENOUS; SUBCUTANEOUS at 09:16

## 2020-09-03 RX ADMIN — ENOXAPARIN SODIUM 30 MG: 30 INJECTION SUBCUTANEOUS at 09:16

## 2020-09-03 RX ADMIN — INSULIN LISPRO 3 UNITS: 100 INJECTION, SOLUTION INTRAVENOUS; SUBCUTANEOUS at 12:48

## 2020-09-03 RX ADMIN — HYDRALAZINE HYDROCHLORIDE 10 MG: 20 INJECTION INTRAMUSCULAR; INTRAVENOUS at 09:16

## 2020-09-03 RX ADMIN — Medication 1 G: at 18:51

## 2020-09-03 RX ADMIN — INSULIN LISPRO 2 UNITS: 100 INJECTION, SOLUTION INTRAVENOUS; SUBCUTANEOUS at 22:31

## 2020-09-03 RX ADMIN — INSULIN GLARGINE 20 UNITS: 100 INJECTION, SOLUTION SUBCUTANEOUS at 13:33

## 2020-09-03 RX ADMIN — INSULIN LISPRO 1 UNITS: 100 INJECTION, SOLUTION INTRAVENOUS; SUBCUTANEOUS at 01:56

## 2020-09-03 RX ADMIN — AMLODIPINE BESYLATE 5 MG: 5 TABLET ORAL at 09:16

## 2020-09-03 ASSESSMENT — PAIN SCALES - GENERAL
PAINLEVEL_OUTOF10: 0

## 2020-09-03 NOTE — H&P
HOSPITALISTS HISTORY AND PHYSICAL    9/3/2020 1:58 AM    Patient Information:  Mukesh Mittal is a [de-identified] y.o. female [id number]  PCP:  Josh Mckeon MD (Tel: [phone] )    Chief complaint:    Chief Complaint   Patient presents with    Hypoglycemia     [facility] BS of 41. squad gave D50 and  after. pt alert and oriented         History of Present Illness:  Ilia Berry is a [de-identified] y.o. female with known history of DM treated with insulin who presents to the ED via EMS for hypoglycemia. Upon arrival to the ED the patient received an amp of D50 with initial improvement of her glucose which subsequently dropped thus requiring initiation of D5 normal saline infusion. Labs were obtained and also revealed patient has UTI as well as TOMASZ. Patient has known CKD however her GFR is much reduced compared to baseline. Finally, the patient was noted to be bradycardic and EKG revealed pulse of 49. Hospitalist team consulted to admit this patient for recalcitrant hypoglycemia, TOMASZ on CKD, UTI, and bradycardia      History obtained from patient and daughter at the bedside  Old medical records show patient has required hospital treatment in the past for recurrent episodes of hypoglycemia. REVIEW OF SYSTEMS:   Constitutional: Negative for fever,chills or night sweats  ENT: Negative for rhinorrhea, epistaxis, hoarseness, sore throat.   Respiratory: Negative for shortness of breath,wheezing  Cardiovascular: Negative for chest pain, palpitations   Gastrointestinal: Positive for nausea, vomiting, diarrhea  Genitourinary: Positive for polyuria, dysuria   Hematologic/Lymphatic: Negative for bleeding tendency, easy bruising  Musculoskeletal: Negative for myalgias and arthralgias  Neurologic: Temporary confusion due to acute hypoglycemia  Skin: Negative for itching,rash  Psychiatric: Negative for depression,anxiety, agitation. Endocrine: Negative for polydipsia,polyuria,heat /cold intolerance. Past Medical History:   has a past medical history of Cataract, Cystocele without mention of uterine prolapse, Depression, DM type 2 (diabetes mellitus, type 2) ([facility] [address] Dysuria, Generalized osteoarthritis of multiple sites, HTN (hypertension), Hyperlipidemia, Menopause, Nephropathy, diabetic ([facility] [address] Osteopenia, and Stroke ([facility] [address] Past Surgical History:   has a past surgical history that includes Cataract removal (2006) and Carpal tunnel release (2000). Medications:  No current facility-administered medications on file prior to encounter.       Current Outpatient Medications on File Prior to Encounter   Medication Sig Dispense Refill    oxybutynin (DITROPAN-XL) 10 MG extended release tablet Take 1 tablet by mouth daily 30 tablet 3    metFORMIN (GLUCOPHAGE) 1000 MG tablet TAKE 1 TABLET BY MOUTH TWO  TIMES A DAY WITH MEALS 180 tablet 1    atorvastatin (LIPITOR) 40 MG tablet TAKE 1 TABLET BY MOUTH ONCE A DAY 90 tablet 1    carvedilol (COREG) 25 MG tablet TAKE 1 TABLET BY MOUTH TWO  TIMES DAILY WITH MEALS 180 tablet 1    clopidogrel (PLAVIX) 75 MG tablet TAKE 1 TABLET BY MOUTH  DAILY 90 tablet 1    amLODIPine (NORVASC) 5 MG tablet Take 1 tablet by mouth daily 90 tablet 1    FLUoxetine (PROZAC) 40 MG capsule TAKE 1 CAPSULE BY MOUTH  ONCE A DAY 90 capsule 1    quinapril (ACCUPRIL) 40 MG tablet TAKE 1 TABLET BY MOUTH ONCE A DAY 90 tablet 1    insulin lispro protamine & lispro (HUMALOG MIX 75/25 KWIKPEN) (75-25) 100 UNIT per ML SUPN injection pen 50 units in AM, 16 units with lunch, 10 units before dinner 25 pen 3    pantoprazole (PROTONIX) 40 MG tablet Take 1 tablet by mouth daily 90 tablet 3    TRUEPLUS PEN NEEDLES 31G X 6 MM MISC USE FOR INSULIN PENS 4 TIMES DAILY 100 each 1    Lancets MISC 1 each by Does not apply route 4 times daily 400 each 3    blood glucose monitor strips Test 4 times a day & as needed for symptoms of irregular blood glucose. 400 strip 3    Blood Glucose Monitoring Suppl (ACCU-CHEK WALTER PLUS) w/Device KIT USE TO TEST BLOOD SUGAR THREE TIMES DAILY Dx Code: E11.21 1 kit 0    blood glucose monitor kit and supplies Test 2 times a day & as needed for symptoms of irregular blood glucose. 1 kit 0    Insulin Syringe-Needle U-100 31G X 5/16\" 1 ML MISC 1 each by Does not apply route 2 times daily 200 each 3    aspirin 325 MG tablet Take 1 tablet by mouth daily. 30 tablet 0       Allergies:  No Known Allergies     Social History:  Patient Lives  reports that she quit smoking about 18 years ago. She has a 7.50 pack-year smoking history. She has never used smokeless tobacco. She reports that she does not drink alcohol or use drugs. Family History:  family history includes Cancer in her brother; Mental Illness in her brother; Stroke in her father. Physical Exam:  BP (!) 179/68   Pulse 59   Temp 97.5 °F (36.4 °C) (Oral)   Resp 15   Ht 5' 3\" (1.6 m)   Wt 190 lb (86.2 kg)   LMP  (LMP Unknown)   SpO2 93%   BMI 33.66 kg/m²     General appearance:  Appears ill and tired  Eyes: Sclera clear, pupils equal  ENT: Moist mucus membranes, no thrush. Trachea midline. Cardiovascular: Bradycardic normal S1, S2. No murmur, gallop, rub. No edema in lower extremities  Respiratory: Clear to auscultation bilaterally, no wheeze, good inspiratory effort  Gastrointestinal: Abdomen soft, minimal suprapubic discomfort without CVA tenderness, not distended, normal bowel sounds  Musculoskeletal: No cyanosis in digits, neck supple  Neurology: Cranial nerves grossly intact. Alert and oriented in time, place and person. No speech or motor deficits  Psychiatry: Appropriate affect.  Not agitated  Skin: Warm, dry, normal turgor, no rash  Brisk capillary refill, peripheral pulses palpable   Labs:  CBC:   Lab Results   Component Value Date    WBC 8.0 09/02/2020    RBC 3.88 09/02/2020    HGB 10.6 09/02/2020    HCT 33.4 09/02/2020    MCV 86.2 09/02/2020    MCH 27.3 09/02/2020    MCHC 31.7 09/02/2020    RDW 18.1 09/02/2020     09/02/2020    MPV 9.5 09/02/2020     BMP:    Lab Results   Component Value Date     09/02/2020    K 4.6 09/02/2020     09/02/2020    CO2 24 09/02/2020    BUN 34 09/02/2020    CREATININE 1.9 09/02/2020    CALCIUM 9.7 09/02/2020    GFRAA 31 09/02/2020    GFRAA >60 03/21/2013    LABGLOM 25 09/02/2020    LABGLOM 49.0 07/19/2011    GLUCOSE 128 09/02/2020    GLUCOSE 230 07/19/2011     XR CHEST PORTABLE   Final Result   Unremarkable portable chest radiograph. Chest Xray:   EKG:    I visualized CXR images and EKG strips  Ventricular Rate  49 P BPM  QTc Calculation (Bazett)  458 P ms    Atrial Rate  49 P BPM  P [place]  51 P degrees    P-R Interval  178 P ms  R Axis  33 P degrees    QRS Duration  90 P ms  T Axis  51 P degrees    Q-T Interval  508 P ms  Diagnosis  Marked sinus bradycardiaLow voltage QRSAbnormal ECG P         Discussed case  with ED provider    Problem List  Principal Problem:    Hypoglycemia  Active Problems:    DM type 2 (diabetes mellitus, type 2) ([facility])    GERD (gastroesophageal reflux disease)    Acute kidney injury superimposed on CKD ([facility])    UTI (urinary tract infection)  Resolved Problems:    * No resolved hospital problems. *        Assessment/Plan:     1. Recalcitrant hypoglycemia in patient with IDDM  -Patient initiated on D5 NS with every 2 hours glucose checks overnight  -Basal insulin currently on hold  - Sliding scale insulin ordered every 4 hours to provide coverage  -A1c pending    2. CKD with TOMASZ and UTI  - Hold all nephrotoxic agents including Accupril, Protonix, and metformin  -Strict I's and O's  - Blood and urine sent for culture with IV Rocephin initiation  -Consultation placed to nephrology    3.   Profound bradycardia  -Patient on Coreg 25 twice daily placed on temporary hold with cardiology consult pending  -TSH, free T4, ionized calcium pending  -Repeat EKG in a.m.    4.  Hypertension  -ACE-RI and beta-blocker on hold due to comorbid issues listed above  -Continue Norvasc with IV hydralazine scheduled with hold parameters in place      DVT prophylaxis renal dose Lovenox 30 mg daily  Code status-full code  Diet-cardiac with carb restriction  IV access- PIV placed in ED    Admit as inpatient I anticipate hospitalization spanning more than two midnights for investigation and treatment of the above medically necessary diagnoses. Please note that some part of this chart was generated using Dragon dictation software. Although every effort was made to ensure the accuracy of this automated transcription, some errors in transcription may have occurred inadvertently. If you may need any clarification, please do not hesitate to contact me through [facility].        Ford Ontiveros MD    9/3/2020 1:58 AM

## 2020-09-03 NOTE — PROGRESS NOTES
[facility] PROGRESS NOTE    9/3/2020 12:41 PM        Name: Tiara Morillo .               Admitted: 9/2/2020  Primary Care Provider: GENNA BAINS MARLEY [facility], MD (Tel: [phone])      Subjective:    Patient lying in bed feeling better no sob or chest pian    Reviewed interval ancillary notes    Current Medications  amLODIPine (NORVASC) tablet 5 mg, Daily  atorvastatin (LIPITOR) tablet 40 mg, Nightly  clopidogrel (PLAVIX) tablet 75 mg, Daily  FLUoxetine (PROZAC) capsule 40 mg, Daily  glucose (GLUTOSE) 40 % oral gel 15 g, PRN  dextrose 50 % IV solution, PRN  glucagon (rDNA) injection 1 mg, PRN  dextrose 5 % solution, PRN  insulin lispro (1 Unit Dial) 0-6 Units, Q4H  cefTRIAXone (ROCEPHIN) 1 g in sterile water 10 mL IV syringe, Q24H  0.9 % sodium chloride infusion, Continuous  hydrALAZINE (APRESOLINE) injection 20 mg, Q8H  insulin glargine (LANTUS;BASAGLAR) injection pen 20 Units, Daily  sodium chloride flush 0.9 % injection 10 mL, 2 times per day  sodium chloride flush 0.9 % injection 10 mL, PRN  polyethylene glycol (GLYCOLAX) packet 17 g, Daily PRN  promethazine (PHENERGAN) tablet 12.5 mg, Q6H PRN    Or  ondansetron (ZOFRAN) injection 4 mg, Q6H PRN  sodium chloride flush 0.9 % injection 10 mL, PRN  potassium chloride (KLOR-CON M) extended release tablet 40 mEq, PRN    Or  potassium bicarb-citric acid (EFFER-K) effervescent tablet 40 mEq, PRN    Or  potassium chloride 10 mEq/100 mL IVPB (Peripheral Line), PRN  magnesium sulfate 1 g in dextrose 5% 100 mL IVPB, PRN  acetaminophen (TYLENOL) tablet 650 mg, Q6H PRN    Or  acetaminophen (TYLENOL) suppository 650 mg, Q6H PRN  enoxaparin (LOVENOX) injection 30 mg, Daily        Objective:  BP (!) 168/71   Pulse 65   Temp 98.7 °F (37.1 °C) (Oral)   Resp 16   Ht 5' 3\" (1.6 m)   Wt 192 lb 4.8 oz (87.2 kg)   LMP  (LMP Unknown)   SpO2 93%   BMI 34.06 kg/m²     Intake/Output Summary (Last 24 hours) at 9/3/2020 1241  Last data filed at 9/3/2020 0958  Gross per 24 hour   Intake 1480 ml   Output 700 ml   Net 780 ml      Wt Readings from Last 3 Encounters:   09/03/20 192 lb 4.8 oz (87.2 kg)   07/09/20 186 lb (84.4 kg)   03/09/20 189 lb (85.7 kg)       General appearance:  Appears comfortable. AAOx3  HEENT: atraumatic, Pupils equal, muscous membranes moist, no masses appreciated  Cardiovascular: Regular rate and rhythm no murmurs appreciated  Respiratory: CTAB no wheezing  Gastrointestinal: Abdomen soft, non-tender, BS+  EXT: no edema  Neurology: no gross focal deficts  Psychiatry: Appropriate affect. Not agitated  Skin: Warm, dry, no rashes appreciated    Labs and Tests:  CBC:   Recent Labs     09/02/20  1629 09/03/20  0539   WBC 8.0 6.3   HGB 10.6* 9.7*    231     BMP:    Recent Labs     09/02/20  1629 09/03/20  0539    140   K 4.6 4.2    108   CO2 24 21   BUN 34* 26*   CREATININE 1.9* 1.5*   GLUCOSE 128* 257*     Hepatic:   Recent Labs     09/02/20  1629 09/03/20  0539   AST 13* 11*   ALT <5* <5*   BILITOT 0.3 0.4   ALKPHOS 62 50     XR CHEST PORTABLE   Final Result   Unremarkable portable chest radiograph. Recent imaging reviewed    Problem List  Principal Problem:    Hypoglycemia  Active Problems:    DM type 2 (diabetes mellitus, type 2) ([facility])    GERD (gastroesophageal reflux disease)    Acute kidney injury superimposed on CKD ([facility] [address]    UTI (urinary tract infection)  Resolved Problems:    * No resolved hospital problems.  *       Assessment & Plan:   dm2 with hypoglycemia-  Improved, start lantus @ 20 and ssi,   - aic pending    TOMASZ with ckd3: continu eivf, hold nphrotoxic meds  - nephrology onboard    UTI: rocephin fu culture    Bradycardia: improving hold coreg cards consulted  - tsh penidng    Htn: add hydralzine oral for now while holding ACEI        Diet: DIET CARDIAC; Carb Control: 5 carb choices (75 gms)/meal; No Added Salt (3-4 GM)  Code:Full Code  DVT PPXlovenox  Disposition home after resolution of symptoms 1-2 days      January Samuels MD   9/3/2020 12:41 PM

## 2020-09-03 NOTE — PLAN OF CARE
Problem:   Goal: No urinary complication  Outcome: Ongoing     Problem: Nutrition  Goal: Optimal nutrition therapy  Outcome: Ongoing     Problem: Falls - Risk of:  Goal: Will remain free from falls  Description: Will remain free from falls  Outcome: Ongoing  Note: High fall risk. Patient refusing bed alarm. Steady on feet. Non-skid footwear on patient, call light within reach, bed in lowest position.

## 2020-09-03 NOTE — CONSULTS
[facility]   Electrophysiology Consultation   Date: 9/3/2020  Reason for Consultation: Bradycardia   Consult Requesting Physician: Matt Snider MD     Chief Complaint   Patient presents with    Hypoglycemia     comes in per [facility] from home due to low BS of 41. squad gave D50 and  after. pt alert and oriented      HPI: Yung Orr is a [de-identified] y.o. female history of DM on insulin who has been admitted for hypoglycemia and treated with D50 on arrival. Patient states that she was not feeling well. Denies any chest pain, SOB, palpitation. She has history of HTN and obesity. She has diarrhea but no abdominal pain. She was found to be bradycardic. Her Coreg has been held. Denies prior history of syncope. No prior history of cardiac issues. Past Medical History:   Diagnosis Date    Cataract     Cystocele without mention of uterine prolapse     Depression     DM type 2 (diabetes mellitus, type 2) (HCC)     Dysuria     Generalized osteoarthritis of multiple sites     HTN (hypertension)     Hyperlipidemia     Menopause     Nephropathy, diabetic ([facility] [address]     Osteopenia     Stroke [facility])         Past Surgical History:   Procedure Laterality Date    CARPAL TUNNEL RELEASE  2000    Right     CATARACT REMOVAL  2006    Bilateral Cataracts       No Known Allergies    Social History:  Reviewed. reports that she quit smoking about 18 years ago. She has a 7.50 pack-year smoking history. She has never used smokeless tobacco. She reports that she does not drink alcohol or use drugs. Family History:  Reviewed. family history includes Cancer in her brother; Mental Illness in her brother; Stroke in her father. Review of System:  All other systems reviewed except for that noted above.  Pertinent negatives and positives are:       · General: negative for fever, chills   · Ophthalmic ROS: negative for - eye pain or loss of vision  · ENT ROS: negative for - headaches, sore throat · Respiratory: negative for - cough, sputum  · Cardiovascular: Reviewed in HPI  · Gastrointestinal: negative for - abdominal pain, diarrhea, N/V  · Hematology: negative for - bleeding, blood clots, bruising or jaundice  · Genito-Urinary:  negative for - Dysuria or incontinence  · Musculoskeletal: negative for - Joint swelling, muscle pain  · Neurological: negative for - confusion, dizziness, headaches   · Psychiatric: No anxiety, no depression. · Dermatological: negative for - rash    Physical Examination:  Vitals:    20 1215   BP: (!) 168/71   Pulse: 65   Resp: 16   Temp:    SpO2: 93%      In: 1480 [P.O.:480]  Out: 700    Wt Readings from Last 3 Encounters:   20 192 lb 4.8 oz (87.2 kg)   20 186 lb (84.4 kg)   20 189 lb (85.7 kg)     Temp  Av.1 °F (36.7 °C)  Min: 97.5 °F (36.4 °C)  Max: 98.7 °F (37.1 °C)  Pulse  Av.3  Min: 48  Max: 75  BP  Min: 117/54  Max: 179/68  SpO2  Av %  Min: 93 %  Max: 99 %    Intake/Output Summary (Last 24 hours) at 9/3/2020 1456  Last data filed at 9/3/2020 0958  Gross per 24 hour   Intake 1480 ml   Output 700 ml   Net 780 ml       · Telemetry: Sinus rhythm   · Constitutional: Oriented. No distress. · Head: Normocephalic and atraumatic. · Mouth/Throat: Oropharynx is clear and moist.   · Eyes: Conjunctivae normal. EOM are normal.   · Neck: Neck supple. No rigidity. No JVD present. · Cardiovascular: Normal rate, regular rhythm, S1&S2. · Pulmonary/Chest: Bilateral respiratory sounds. No wheezes, No rhonchi. · Abdominal: Soft. Bowel sounds present. No distension, No tenderness. + obese   · Musculoskeletal: No tenderness. No edema    · Lymphadenopathy: Has no cervical adenopathy. · Neurological: Alert and oriented. Cranial nerve appears intact, No Gross deficit   · Skin: Skin is warm and dry. No rash noted. · Psychiatric: Has a normal behavior     Labs, diagnostic and imaging results reviewed. Reviewed.    Recent Labs 09/02/20  1629 09/03/20  0539    140   K 4.6 4.2    108   CO2 24 21   PHOS  --  2.8   BUN 34* 26*   CREATININE 1.9* 1.5*     Recent Labs     09/02/20  1629 09/03/20  0539   WBC 8.0 6.3   HGB 10.6* 9.7*   HCT 33.4* 30.1*   MCV 86.2 84.0    231     Lab Results   Component Value Date    CKTOTAL 315 07/12/2018    TROPONINI <0.01 09/02/2020     No results found for: BNP  Lab Results   Component Value Date    PROTIME 12.1 07/12/2018    PROTIME 11.6 07/01/2013    PROTIME 11.2 02/16/2010    INR 1.06 07/12/2018    INR 1.02 07/01/2013    INR 1.03 02/16/2010     Lab Results   Component Value Date    CHOL 135 12/10/2019    HDL 59 12/10/2019    HDL 65 11/14/2011    TRIG 87 12/10/2019       ECG: Sinus bradycardia   Echo:  Normal left ventricle size, wall thickness and systolic function with an   estimated ejection fraction of 60%. No regional wall motion abnormalities   are seen. Trivial mitral regurgitation is present. The left atrium is dilated. There is trivial tricuspid regurgitation with RVSP estimated at 22 mmHg.     Scheduled Meds:   amLODIPine  5 mg Oral Daily    atorvastatin  40 mg Oral Nightly    clopidogrel  75 mg Oral Daily    FLUoxetine  40 mg Oral Daily    cefTRIAXone (ROCEPHIN) IV  1 g Intravenous Q24H    hydrALAZINE  20 mg Intravenous Q8H    insulin glargine  20 Units Subcutaneous Daily    insulin lispro  0-6 Units Subcutaneous 4x Daily AC & HS    sodium chloride flush  10 mL Intravenous 2 times per day    enoxaparin  30 mg Subcutaneous Daily     Continuous Infusions:   dextrose      sodium chloride 75 mL/hr at 09/03/20 1318     PRN Meds:.glucose, dextrose, glucagon (rDNA), dextrose, sodium chloride flush, polyethylene glycol, promethazine **OR** ondansetron, sodium chloride flush, potassium chloride **OR** potassium alternative oral replacement **OR** potassium chloride, magnesium sulfate, acetaminophen **OR** acetaminophen     Patient Active Problem List    Diagnosis Date Noted    DM type 2 (diabetes mellitus, type 2) ([address] 07/19/2011     Priority: High    Essential hypertension 07/19/2011     Priority: High    Hyperlipidemia 07/19/2011     Priority: High    Edema 07/19/2011     Priority: Medium    Generalized osteoarthritis of multiple sites 07/19/2011     Priority: Medium    Colon polyp- last colon 9/10 11/14/2011     Priority: Low    Osteopenia DXA -1.6 (7/10) 07/19/2011     Priority: Low    Nephropathy, diabetic ([address] 07/19/2011     Priority: Low    Hypoglycemia 09/02/2020    Acute kidney injury superimposed on CKD ([address] 09/02/2020    UTI (urinary tract infection) 09/02/2020    Chronic renal disease, stage 3, moderately decreased glomerular filtration rate (GFR) between 30-59 mL/min/1.73 square meter ([address] 12/05/2018    Actinic keratosis of left cheek 12/05/2018    Urinary retention 10/19/2017    Cerebral vascular disease 07/19/2017    Stenosis of right carotid artery GERTRUDE < 50% (6/2017), repeat 1 year 07/19/2017    History of CVA (cerebrovascular accident) 2017 by MRI, no Sx     Primary osteoarthritis of right knee 12/19/2016    Diabetic polyneuropathy associated with type 2 diabetes mellitus ([address] 07/15/2016    Left thyroid nodule by MRI 2016 05/04/2016    Incomplete tear of right rotator cuff 05/02/2016    Brachial plexopathy 02/24/2016    Carpal tunnel syndrome of right wrist 02/24/2016    Rotator cuff strain 02/12/2016    Major depressive disorder, single episode, mild ([address] 10/05/2015    GERD (gastroesophageal reflux disease) 11/12/2012    Fecal urgency 03/20/2012    Needs flu shot 11/14/2011      Active Hospital Problems    Diagnosis Date Noted    DM type 2 (diabetes mellitus, type 2) ([address] [E11.9] 07/19/2011     Priority: High    Hypoglycemia [E16.2] 09/02/2020    Acute kidney injury superimposed on CKD ([address] [N17.9, N18.9] 09/02/2020    UTI (urinary tract infection) [N39.0] 09/02/2020    GERD (gastroesophageal reflux disease) [K21.9] 11/12/2012       Assessment:     - Bradycardia:    She was on Coreg which has been held. Patient denies having any prior syncope. No palpitation, chest pain, SOB. TSH is normal.      Hold Coreg. Use ACE-I, Norvasc or diuretics for HTN.       - DM- Hypoglycemia:    On insulin    Per primary team.     - UTI: on antibiotic therapy. No further EP recommendation. Will sign off. Thank you for allowing me to participate in the care of [facility]     All questions and concerns were addressed to the patient/family. Alternatives to my treatment were discussed. I have discussed the above stated plan and the patient verbalized understanding and agreed with the plan. NOTE: This report was transcribed using voice recognition software. Every effort was made to ensure accuracy, however, inadvertent computerized transcription errors may be present.      Farrukh Romano MD, MPH  [place] [address]   Office: [phone]

## 2020-09-03 NOTE — PROGRESS NOTES
Nephrology Consult receivedfull consult note to follow. Thank you for allowing us to participate in this patients care. Please do not hesitate to contact me, if any questions/concerns. We will follow along with you. Aster Walters MD  [facility] [address] [place] [address]   [phone] or [address] [employer].

## 2020-09-03 NOTE — CONSULTS
MD Keith Boyce MD Arville Holmes, MD               Office: [phone]                      Fax: [phone]             0 [facility]                   NEPHROLOGY INITIAL CONSULT NOTE:     PATIENT NAME: Tiara Morillo  : 1940  MRN: [id number]  REASON FOR CONSULT: I am asked to see this patient in consultation for my opinion regarding management of Acute Kidney Injury . My recommendations will be communicated by way of shared medical record. 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 Associated problems:   - lytes stable  - BP uncontrolled   - Anemia: mild -      - H/O CKD-3 - likely d/to DM/HTN, no previous renal eval,   : would need outpt renal f/u   : (+) spot MACr - not on JOHN blockade       : other supportive care :   - Check daily renal function panel with electrolytes-phosphorus  - Strict monitoring of I/Os, daily weight  - Renal feeds/diet  - Current medications reviewed. - Nephrotoxic medications have been discontinued. - Dose adjusted and appropriate.      - Dose meds for eGFR <15 mL/min/1.73m2 during TOMASZ    - Avoid heavy opioids due to renal failure - may use very low dose dilaudid / fentanyl with close monitoring of CNS and respiratory depression. Other problems: Management per primary and other consulting teams. Hospital Problems           Last Modified POA    * (Principal) Hypoglycemia 9/2/2020 Yes    DM type 2 (diabetes mellitus, type 2) ([facility] [address] 9/2/2020 Yes    GERD (gastroesophageal reflux disease) 9/2/2020 Yes    Acute kidney injury superimposed on CKD ([facility] [address] 9/2/2020 Yes    UTI (urinary tract infection) 9/2/2020 Yes          Please refer to the orders. High Complexity. Multiple complex problems. Discussed with patient, and treatment team-   Thank you for allowing me to participate in this patient's care. Please do not hesitate to contact me with any questions/concerns. We will follow along with you. Young Jeffers MD       [facility] [address] [place]: [phone] or [facility] [employer]  Fax: [phone]        ========================================================   ========================================================       CHIEF COMPLAINT:   Chief Complaint   Patient presents with    Hypoglycemia     comes in per [facility] from home due to low BS of 41. squad gave D50 and  after. pt alert and oriented      History Obtained From:  patient + treatment team + Electronic Medical Records    HPI: Ms. Noreen Quiles is a [de-identified] y.o. female with significant past medical history as below:   Past Medical History:   Diagnosis Date    Cataract     Cystocele without mention of uterine prolapse     Depression     DM type 2 (diabetes mellitus, type 2) ([facility] [address]     Dysuria     Generalized osteoarthritis of multiple sites     HTN (hypertension)     Hyperlipidemia     Menopause     Nephropathy, diabetic ([facility] [address]     Osteopenia     Stroke ([facility] [address]       Presents with Hypoglycemia (comes in per [facility] from home due to low BS of 41. squad gave D50 and  after. pt alert and oriented )    Admitted with Hypoglycemia [E16.2]  Hypoglycemia [E16.2]   Found to have TOMASZ , so we are called for that.    No Substance and Sexual Activity    Alcohol use: No    Drug use: No    Sexual activity: None   Lifestyle    Physical activity     Days per week: None     Minutes per session: None    Stress: None   Relationships    Social connections     Talks on phone: None     Gets together: None     Attends Worship service: None     Active member of club or organization: None     Attends meetings of clubs or organizations: None     Relationship status: None    Intimate partner violence     Fear of current or ex partner: None     Emotionally abused: None     Physically abused: None     Forced sexual activity: None   Other Topics Concern    None   Social History Narrative    None          MEDICATIONS: reviewed by me. Medications Prior to Admission:  No current facility-administered medications on file prior to encounter.       Current Outpatient Medications on File Prior to Encounter   Medication Sig Dispense Refill    oxybutynin (DITROPAN-XL) 10 MG extended release tablet Take 1 tablet by mouth daily 30 tablet 3    metFORMIN (GLUCOPHAGE) 1000 MG tablet TAKE 1 TABLET BY MOUTH TWO  TIMES A DAY WITH MEALS 180 tablet 1    atorvastatin (LIPITOR) 40 MG tablet TAKE 1 TABLET BY MOUTH ONCE A DAY 90 tablet 1    carvedilol (COREG) 25 MG tablet TAKE 1 TABLET BY MOUTH TWO  TIMES DAILY WITH MEALS 180 tablet 1    clopidogrel (PLAVIX) 75 MG tablet TAKE 1 TABLET BY MOUTH  DAILY 90 tablet 1    amLODIPine (NORVASC) 5 MG tablet Take 1 tablet by mouth daily 90 tablet 1    FLUoxetine (PROZAC) 40 MG capsule TAKE 1 CAPSULE BY MOUTH  ONCE A DAY 90 capsule 1    quinapril (ACCUPRIL) 40 MG tablet TAKE 1 TABLET BY MOUTH ONCE A DAY 90 tablet 1    insulin lispro protamine & lispro (HUMALOG MIX 75/25 KWIKPEN) (75-25) 100 UNIT per ML SUPN injection pen 50 units in AM, 16 units with lunch, 10 units before dinner 25 pen 3    pantoprazole (PROTONIX) 40 MG tablet Take 1 tablet by mouth daily 90 tablet 3    TRUEPLUS PEN NEEDLES 31G X 6 MM MISC USE FOR INSULIN PENS 4 TIMES DAILY 100 each 1    Lancets MISC 1 each by Does not apply route 4 times daily 400 each 3    blood glucose monitor strips Test 4 times a day & as needed for symptoms of irregular blood glucose. 400 strip 3    Blood Glucose Monitoring Suppl (ACCU-CHEK WALTER PLUS) w/Device KIT USE TO TEST BLOOD SUGAR THREE TIMES DAILY Dx Code: E11.21 1 kit 0    blood glucose monitor kit and supplies Test 2 times a day & as needed for symptoms of irregular blood glucose. 1 kit 0    Insulin Syringe-Needle U-100 31G X 5/16\" 1 ML MISC 1 each by Does not apply route 2 times daily 200 each 3    aspirin 325 MG tablet Take 1 tablet by mouth daily. 30 tablet 0       Medications Prior to Admission: oxybutynin (DITROPAN-XL) 10 MG extended release tablet, Take 1 tablet by mouth daily  metFORMIN (GLUCOPHAGE) 1000 MG tablet, TAKE 1 TABLET BY MOUTH TWO  TIMES A DAY WITH MEALS  atorvastatin (LIPITOR) 40 MG tablet, TAKE 1 TABLET BY MOUTH ONCE A DAY  carvedilol (COREG) 25 MG tablet, TAKE 1 TABLET BY MOUTH TWO  TIMES DAILY WITH MEALS  clopidogrel (PLAVIX) 75 MG tablet, TAKE 1 TABLET BY MOUTH  DAILY  amLODIPine (NORVASC) 5 MG tablet, Take 1 tablet by mouth daily  FLUoxetine (PROZAC) 40 MG capsule, TAKE 1 CAPSULE BY MOUTH  ONCE A DAY  quinapril (ACCUPRIL) 40 MG tablet, TAKE 1 TABLET BY MOUTH ONCE A DAY  insulin lispro protamine & lispro (HUMALOG MIX 75/25 KWIKPEN) (75-25) 100 UNIT per ML SUPN injection pen, 50 units in AM, 16 units with lunch, 10 units before dinner  pantoprazole (PROTONIX) 40 MG tablet, Take 1 tablet by mouth daily  TRUEPLUS PEN NEEDLES 31G X 6 MM MISC, USE FOR INSULIN PENS 4 TIMES DAILY  Lancets MISC, 1 each by Does not apply route 4 times daily  blood glucose monitor strips, Test 4 times a day & as needed for symptoms of irregular blood glucose.   Blood Glucose Monitoring Suppl (ACCU-CHEK WALTER PLUS) w/Device KIT, USE TO TEST BLOOD SUGAR THREE TIMES DAILY Dx Code: E11.21  blood glucose monitor kit and °F (36.7 °C) Oral 53 16 96 % -- --   09/02/20 2200 (!) 140/49 -- -- -- -- 96 % -- --   09/02/20 2130 (!) 141/56 -- -- -- -- 97 % -- --   09/02/20 2100 (!) 146/56 -- -- -- -- 96 % -- --   09/02/20 2000 (!) 125/53 -- -- -- -- 95 % -- --   09/02/20 1840 -- 97.7 °F (36.5 °C) Oral -- -- -- -- --   09/02/20 1830 (!) 126/44 -- -- -- -- 93 % -- --   09/02/20 1800 (!) 132/49 -- -- -- -- 96 % -- --   09/02/20 1630 (!) 117/54 -- -- -- -- 93 % -- --   09/02/20 1606 (!) 120/56 -- -- (!) 48 18 99 % 5' 3\" (1.6 m) 190 lb (86.2 kg)       Intake/Output Summary (Last 24 hours) at 9/3/2020 0859  Last data filed at 9/3/2020 0507  Gross per 24 hour   Intake 1000 ml   Output 700 ml   Net 300 ml     Physical Exam  Vitals signs reviewed. Constitutional:       General: She is not in acute distress. Comments: Obese body habitus   HENT:      Head: Normocephalic and atraumatic. Right Ear: External ear normal.      Left Ear: External ear normal.      Mouth/Throat:      Mouth: Mucous membranes are not dry. Eyes:      General: No scleral icterus. Conjunctiva/sclera: Conjunctivae normal.   Neck:      Musculoskeletal: Neck supple. Thyroid: No thyroid mass. Vascular: No JVD. Trachea: Trachea normal.   Cardiovascular:      Rate and Rhythm: Normal rate and regular rhythm. Pulmonary:      Effort: Pulmonary effort is normal.      Breath sounds: Normal breath sounds. Abdominal:      General: Bowel sounds are normal.      Palpations: Abdomen is soft. Musculoskeletal:         General: No deformity. Right lower leg: Edema present. Left lower leg: Edema present. Comments: Trace    Skin:     General: Skin is warm and dry. Neurological:      Mental Status: She is alert and oriented to person, place, and time.    Psychiatric:         Behavior: Behavior normal.              DATA:  Diagnostic tests reviewed by me for today's visit:    Recent Labs     09/02/20  1629 09/03/20  0539   WBC 8.0 6.3   HCT 33.4* 30.1*  231     Iron Saturation:  No components found for: PERCENTFE  FERRITIN:  No results found for: FERRITIN  IRON:  No results found for: IRON  TIBC:  No results found for: TIBC    Recent Labs     09/02/20  1629        K 4.6        CO2 24    BUN 34*    CREATININE 1.9*      Recent Labs     09/02/20  1629 09/03/20  0539   CALCIUM 9.7 8.7   PHOS  --  2.8     No results for input(s): PH, PCO2, PO2 in the last 72 hours.     Invalid input(s): Percell Bridget    ABG:  No results found for: PH, PCO2, PO2, HCO3, BE, THGB, TCO2, O2SAT  VBG:    Lab Results   Component Value Date    PHVEN 7.395 09/03/2020    ZVE6PYU 36.0 08/22/2018    BEVEN -1.1 08/22/2018    L4BIENFW 100 08/22/2018       LDH:  No results found for: LDH  Uric Acid:  No results found for: LABURIC, URICACID    PT/INR:    Lab Results   Component Value Date    PROTIME 12.1 07/12/2018    INR 1.06 07/12/2018     Warfarin PT/INR:  No components found for: PTPATWAR, PTINRWAR  PTT:    Lab Results   Component Value Date    APTT 42.0 07/12/2018   [APTT}  Last 3 Troponin:    Lab Results   Component Value Date    TROPONINI <0.01 09/02/2020    TROPONINI <0.01 08/22/2018    TROPONINI <0.01 07/12/2018       U/A:    Lab Results   Component Value Date    NITRITE NEGATIVE 10/19/2017    COLORU Yellow 09/02/2020    PROTEINU 30 09/02/2020    PHUR 5.5 09/02/2020    WBCUA 46 09/02/2020    RBCUA 3 09/02/2020    BACTERIA RARE 08/19/2018    CLARITYU Clear 09/02/2020    SPECGRAV 1.020 09/02/2020    LEUKOCYTESUR MODERATE 09/02/2020    UROBILINOGEN 0.2 09/02/2020    BILIRUBINUR Negative 09/02/2020    BILIRUBINUR NEGATIVE 10/19/2017    BLOODU Negative 09/02/2020    GLUCOSEU 100 09/02/2020     Microalbumen/Creatinine ratio:  No components found for: RUCREAT  24 Hour Urine for Protein:  No components found for: RAWUPRO, UHRS3, EUNO28RM, UTV3  24 Hour Urine for Creatinine Clearance:  No components found for: CREAT4, UHRS10, UTV10  Urine Toxicology:  No components found for: IAMMENTA, IBARBIT, IBENZO, ICOCAINE, IMARTHC, IOPIATES, IPHENCYC    HgBA1c:    Lab Results   Component Value Date    LABA1C 8.2 07/09/2020     RPR:  No results found for: RPR  HIV:  No results found for: HIV  KAMINI:  No results found for: ANATITER, KAMINI  RF:  No results found for: RF  DSDNA:  No components found for: DNA  AMYLASE:    Lab Results   Component Value Date    AMYLASE 72 11/12/2012     LIPASE:    Lab Results   Component Value Date    LIPASE 25.52 11/12/2012     Fibrinogen Level:  No components found for: FIB       BELOW MENTIONED RADIOLOGY STUDY RESULTS BY ME:    Xr Chest Portable    Result Date: 9/2/2020  EXAMINATION: ONE XRAY VIEW OF THE CHEST 9/2/2020 1:22 pm COMPARISON: 08/22/2018 HISTORY: ORDERING SYSTEM PROVIDED HISTORY: SOB TECHNOLOGIST PROVIDED HISTORY: Reason for exam:->SOB FINDINGS: The cardial-pericardial silhouette is unremarkable in appearance. The lungs are clear. No pneumothorax is found. No free air is seen. No acute bony abnormality. Unremarkable portable chest radiograph.

## 2020-09-03 NOTE — ED NOTES
Report given to [place]. All questions answered. Pt discharged to floor with all belonings and D50 infusing.       Ama Gallegos RN  09/02/20 0266

## 2020-09-03 NOTE — PROGRESS NOTES
Pt admitted to room 3315. VSS. Pt A&Ox4. Went over [address] with pt and daughter. Pt and daughter v/u. Oriented pt to room and call light. Instructed pt to call out with any needs. Bed side table and call light within reach. Will monitor.

## 2020-09-04 LAB
ALBUMIN SERPL-MCNC: 3.7 G/DL (ref 3.4–5)
ANION GAP SERPL CALCULATED.3IONS-SCNC: 12 MMOL/L (ref 3–16)
BUN BLDV-MCNC: 15 MG/DL (ref 7–20)
C DIFF TOXIN/ANTIGEN: NORMAL
CALCIUM SERPL-MCNC: 8.3 MG/DL (ref 8.3–10.6)
CHLORIDE BLD-SCNC: 107 MMOL/L (ref 99–110)
CO2: 22 MMOL/L (ref 21–32)
CREAT SERPL-MCNC: 1.3 MG/DL (ref 0.6–1.2)
GFR AFRICAN AMERICAN: 48
GFR NON-AFRICAN AMERICAN: 39
GLUCOSE BLD-MCNC: 221 MG/DL (ref 70–99)
GLUCOSE BLD-MCNC: 228 MG/DL (ref 70–99)
GLUCOSE BLD-MCNC: 310 MG/DL (ref 70–99)
GLUCOSE BLD-MCNC: 354 MG/DL (ref 70–99)
GLUCOSE BLD-MCNC: 461 MG/DL (ref 70–99)
PERFORMED ON: ABNORMAL
PHOSPHORUS: 3.7 MG/DL (ref 2.5–4.9)
POTASSIUM SERPL-SCNC: 3.6 MMOL/L (ref 3.5–5.1)
SODIUM BLD-SCNC: 141 MMOL/L (ref 136–145)

## 2020-09-04 PROCEDURE — 36415 COLL VENOUS BLD VENIPUNCTURE: CPT

## 2020-09-04 PROCEDURE — 80069 RENAL FUNCTION PANEL: CPT

## 2020-09-04 PROCEDURE — 2580000003 HC RX 258: Performed by: INTERNAL MEDICINE

## 2020-09-04 PROCEDURE — 1200000000 HC SEMI PRIVATE

## 2020-09-04 PROCEDURE — 87324 CLOSTRIDIUM AG IA: CPT

## 2020-09-04 PROCEDURE — 87449 NOS EACH ORGANISM AG IA: CPT

## 2020-09-04 PROCEDURE — 6360000002 HC RX W HCPCS: Performed by: INTERNAL MEDICINE

## 2020-09-04 PROCEDURE — 6360000002 HC RX W HCPCS: Performed by: HOSPITALIST

## 2020-09-04 PROCEDURE — 6370000000 HC RX 637 (ALT 250 FOR IP): Performed by: INTERNAL MEDICINE

## 2020-09-04 PROCEDURE — 2580000003 HC RX 258: Performed by: HOSPITALIST

## 2020-09-04 PROCEDURE — 6370000000 HC RX 637 (ALT 250 FOR IP): Performed by: HOSPITALIST

## 2020-09-04 RX ORDER — INSULIN LISPRO 100 [IU]/ML
0-6 INJECTION, SOLUTION INTRAVENOUS; SUBCUTANEOUS NIGHTLY
Status: DISCONTINUED | OUTPATIENT
Start: 2020-09-04 | End: 2020-09-05

## 2020-09-04 RX ORDER — AMLODIPINE BESYLATE 5 MG/1
10 TABLET ORAL DAILY
Status: DISCONTINUED | OUTPATIENT
Start: 2020-09-04 | End: 2020-09-06 | Stop reason: HOSPADM

## 2020-09-04 RX ORDER — CALCIUM CARBONATE 200(500)MG
500 TABLET,CHEWABLE ORAL 3 TIMES DAILY PRN
Status: DISCONTINUED | OUTPATIENT
Start: 2020-09-04 | End: 2020-09-06 | Stop reason: HOSPADM

## 2020-09-04 RX ORDER — INSULIN LISPRO 100 [IU]/ML
0-12 INJECTION, SOLUTION INTRAVENOUS; SUBCUTANEOUS
Status: DISCONTINUED | OUTPATIENT
Start: 2020-09-04 | End: 2020-09-05

## 2020-09-04 RX ORDER — LISINOPRIL 10 MG/1
10 TABLET ORAL DAILY
Status: DISCONTINUED | OUTPATIENT
Start: 2020-09-04 | End: 2020-09-05

## 2020-09-04 RX ADMIN — INSULIN LISPRO 8 UNITS: 100 INJECTION, SOLUTION INTRAVENOUS; SUBCUTANEOUS at 12:52

## 2020-09-04 RX ADMIN — SODIUM CHLORIDE: 9 INJECTION, SOLUTION INTRAVENOUS at 04:38

## 2020-09-04 RX ADMIN — ENOXAPARIN SODIUM 30 MG: 30 INJECTION SUBCUTANEOUS at 09:01

## 2020-09-04 RX ADMIN — LISINOPRIL 10 MG: 10 TABLET ORAL at 09:01

## 2020-09-04 RX ADMIN — HYDRALAZINE HYDROCHLORIDE 20 MG: 20 INJECTION INTRAMUSCULAR; INTRAVENOUS at 00:20

## 2020-09-04 RX ADMIN — Medication 10 ML: at 20:07

## 2020-09-04 RX ADMIN — INSULIN LISPRO 8 UNITS: 100 INJECTION, SOLUTION INTRAVENOUS; SUBCUTANEOUS at 17:10

## 2020-09-04 RX ADMIN — ACETAMINOPHEN 650 MG: 325 TABLET ORAL at 21:30

## 2020-09-04 RX ADMIN — DESMOPRESSIN ACETATE 40 MG: 0.2 TABLET ORAL at 20:07

## 2020-09-04 RX ADMIN — HYDRALAZINE HYDROCHLORIDE 20 MG: 20 INJECTION INTRAMUSCULAR; INTRAVENOUS at 09:01

## 2020-09-04 RX ADMIN — INSULIN LISPRO 2 UNITS: 100 INJECTION, SOLUTION INTRAVENOUS; SUBCUTANEOUS at 09:04

## 2020-09-04 RX ADMIN — Medication 10 ML: at 00:20

## 2020-09-04 RX ADMIN — FLUOXETINE 40 MG: 20 CAPSULE ORAL at 09:01

## 2020-09-04 RX ADMIN — SODIUM CHLORIDE: 9 INJECTION, SOLUTION INTRAVENOUS at 17:09

## 2020-09-04 RX ADMIN — CLOPIDOGREL BISULFATE 75 MG: 75 TABLET ORAL at 09:01

## 2020-09-04 RX ADMIN — ANTACID TABLETS 500 MG: 500 TABLET, CHEWABLE ORAL at 17:15

## 2020-09-04 RX ADMIN — INSULIN LISPRO 6 UNITS: 100 INJECTION, SOLUTION INTRAVENOUS; SUBCUTANEOUS at 21:26

## 2020-09-04 RX ADMIN — HYDRALAZINE HYDROCHLORIDE 20 MG: 20 INJECTION INTRAMUSCULAR; INTRAVENOUS at 17:09

## 2020-09-04 RX ADMIN — AMLODIPINE BESYLATE 10 MG: 5 TABLET ORAL at 09:01

## 2020-09-04 RX ADMIN — ANTACID TABLETS 500 MG: 500 TABLET, CHEWABLE ORAL at 10:07

## 2020-09-04 RX ADMIN — ACETAMINOPHEN 650 MG: 325 TABLET ORAL at 04:38

## 2020-09-04 ASSESSMENT — PAIN SCALES - GENERAL
PAINLEVEL_OUTOF10: 9
PAINLEVEL_OUTOF10: 0

## 2020-09-04 ASSESSMENT — PAIN DESCRIPTION - DESCRIPTORS: DESCRIPTORS: HEADACHE

## 2020-09-04 ASSESSMENT — PAIN DESCRIPTION - PAIN TYPE: TYPE: ACUTE PAIN

## 2020-09-04 ASSESSMENT — PAIN DESCRIPTION - LOCATION: LOCATION: HEAD

## 2020-09-04 NOTE — PLAN OF CARE
Problem: Pain Control  Goal: Maintain pain level at or below patient's acceptable level (or 5 if patient is unable to determine acceptable level)  Outcome: Ongoing  Goal: Improvement in pain related behaviors BP/HR WNL  Outcome: Ongoing     Problem: Neurological  Goal: Maximum potential motor/sensory/cognitive function  Outcome: Ongoing     Problem: Cardiovascular  Goal: No DVT, peripheral vascular complications  Outcome: Ongoing  Goal: Hemodynamic stability  Outcome: Ongoing  Goal: Anticoagulate/Hct stable  Outcome: Ongoing  Goal: Agreement to quit smoking  Outcome: Ongoing  Goal: Weight maintained or lost  Outcome: Ongoing  Goal: Understanding of dietary restrictions  Outcome: Ongoing     Problem: Respiratory  Goal: No pulmonary complications  Outcome: Ongoing  Goal: O2 Sat > 90%  Outcome: Ongoing  Goal: Supplemental O2 requirements decreased  Outcome: Ongoing  Goal: Agreement to quit smoking  Outcome: Ongoing  Goal: Pneumonia vaccine at discharge as needed  Outcome: Ongoing     Problem: GI  Goal: No bowel complications  Outcome: Ongoing  Goal: Bowel movement at least every other day  Outcome: Ongoing     Problem:   Goal: Adequate urinary output  Outcome: Ongoing  Goal: No urinary complication  Outcome: Ongoing     Problem: Nutrition  Goal: Optimal nutrition therapy  Outcome: Ongoing  Goal: Understanding of nutritional guidelines  Outcome: Ongoing     Problem: Skin Integrity/Risk  Goal: No skin breakdown during hospitalization  Outcome: Ongoing  Goal: Wound healing  Outcome: Ongoing     Problem: Musculor/Skeletal Functional Status  Goal: Highest potential functional level  Outcome: Ongoing  Goal: Absence of falls  Outcome: Ongoing     Problem: Intellectual/Education/Knowledge Deficit  Goal: Teaching initiated upon admission  Outcome: Ongoing  Goal: Written Disposition Instruction form completed  Outcome: Ongoing     Problem: Emotional/Psychosocial  Goal: Understanding of community resources  Outcome: Ongoing Problem: Spiritual  Goal: Coping methods/spiritual issues explored  Outcome: Ongoing  Goal: Acknowledge understanding of advance directives  Outcome: Ongoing  Goal: Supportive care provided  Outcome: Ongoing     Problem: Falls - Risk of:  Goal: Will remain free from falls  Description: Will remain free from falls  Outcome: Ongoing  Goal: Absence of physical injury  Description: Absence of physical injury  Outcome: Ongoing

## 2020-09-04 NOTE — CARE COORDINATION
Discharge Planning Assessment  RN/SW discharge planner met with patient to discuss reason for admission, current living situation, and potential needs at the time of discharge    Demographics/Insurance verified Yes    Current type of dwellin [facility] with 3 steps to get in    Patient from ECF/SW confirmed with: N/A    Living arrangements: Lives with spouse and son    Level of function/Support: Indepenent    PCP:  Dr Ronel Ho    Last Visit to PCP: 1 month ago    DME:  None    Active with any community resources/agencies/skilled home care:  None    Medication compliance issues: Denies    Financial issues that could impact healthcare: No        Tentative discharge plan: Home    Discussed and provided facilities of choice if transition to a skilled nursing facility is required at the time of discharge- No      Discussed with patient and/or family that on the day of discharge home tentative time of discharge will be between 10 AM and noon.     Transportation at the time of discharge:  Son will transport

## 2020-09-04 NOTE — PROGRESS NOTES
MD Jonathan Maharaj MD Jannifer Gallery, MD               Office: [phone]                      Fax: [phone]             [facility]                   NEPHROLOGY INPATIENT PROGRESS NOTE:     PATIENT NAME: Elissa Weller  : 1940  MRN: [id number]       RECOMMENDATIONS:   -  NS for 1 more day    - hold home quinapril   - IV abx for UTIs, f/u UCx  - h/o diarrhea, f/u Cdiff, check stool guiac  - BP uncontrolled, meds reviewed, increased hydralazine ,  - holding Metoprolol, follow cardio for SB   - at higher risk for decompensation - needing closer monitoring       IMPRESSION:       TOMASZ (on proteinuric CKD-3B ):    - BL Scr- 1.2-1.4 lowest recently    : Etiology of TOMASZ - presumed pre-renal w/  hypoglycemia, hypovolemia, Quinapril    - other differentials: unlikely    - UA : 30 pro, 100 glu , some hyline cast = pre-renal, ATN    + LE, WBC -> ?UTI:   - Unremarkable portable chest radiograph. Associated problems:   - lytes stable  - BP uncontrolled   - Anemia: mild -      - H/O CKD-3 - likely d/to DM/HTN, no previous renal eval,   : would need outpt renal f/u   : (+) spot MACr - not on JOHN blockade       : other supportive care :   - Check daily renal function panel with electrolytes-phosphorus  - Strict monitoring of I/Os, daily weight  - Renal feeds/diet  - Current medications reviewed. - Nephrotoxic medications have been discontinued. - Dose adjusted and appropriate. - Dose meds for eGFR <15 mL/min/1.73m2 during TOMASZ    - Avoid heavy opioids due to renal failure - may use very low dose dilaudid / fentanyl with close monitoring of CNS and respiratory depression. Other problems: Management per primary and other consulting teams.    Hospital Problems           Last Modified POA    * (Principal) Hypoglycemia 2020 Yes    DM type 2 (diabetes mellitus, type 2) ([facility] 2020 Yes    GERD (gastroesophageal reflux disease) 2020 Yes Acute kidney injury superimposed on CKD ([facility] 9/2/2020 Yes    UTI (urinary tract infection) 9/2/2020 Yes          Please refer to the orders. High Complexity. Multiple complex problems. Discussed with patient, and treatment team-   Thank you for allowing me to participate in this patient's care. Please do not hesitate to contact me with any questions/concerns. We will follow along with you. Valencia Adler MD       [facility] [address] [place] [address] [place]: [phone] or [address]  Fax: [phone]        ========================================================   ========================================================     Subjective:  Patient was seen comfortably sitting up in the bed,   Reported no active complaints,   Renal function improving . Past medical, Surgical, Social, Family medical history reviewed by me.      PAST MEDICAL HISTORY:   Past Medical History:   Diagnosis Date    Cataract     Cystocele without mention of uterine prolapse     Depression     DM type 2 (diabetes mellitus, type 2) (HCC)     Dysuria     Generalized osteoarthritis of multiple sites     HTN (hypertension)     Hyperlipidemia     Menopause     Nephropathy, diabetic ([address]     Osteopenia     Stroke ([facility]      PAST SURGICAL HISTORY:   Past Surgical History:   Procedure Laterality Date    CARPAL TUNNEL RELEASE  2000    Right     CATARACT REMOVAL  2006    Bilateral Cataracts     FAMILY HISTORY:   Family History   Problem Relation Age of Onset    Stroke Father     Mental Illness Brother         schizophrenia    Cancer Brother         Lung Cancer     SOCIAL HISTORY:   Social History     Socioeconomic History    Marital status:      Spouse name: None    Number of children: None    Years of education: None    Highest education level: None   Occupational History    Occupation: Retired   Social Needs    Financial resource strain: None    Food insecurity     Worry: None     Inability: None  Transportation needs     Medical: None     Non-medical: None   Tobacco Use    Smoking status: Former Smoker     Packs/day: 0.50     Years: 15.00     Pack years: 7.50     Last attempt to quit: 2002     Years since quittin.6    Smokeless tobacco: Never Used    Tobacco comment: congrats   Substance and Sexual Activity    Alcohol use: No    Drug use: No    Sexual activity: None   Lifestyle    Physical activity     Days per week: None     Minutes per session: None    Stress: None   Relationships    Social connections     Talks on phone: None     Gets together: None     Attends Hindu service: None     Active member of club or organization: None     Attends meetings of clubs or organizations: None     Relationship status: None    Intimate partner violence     Fear of current or ex partner: None     Emotionally abused: None     Physically abused: None     Forced sexual activity: None   Other Topics Concern    None   Social History Narrative    None          MEDICATIONS: reviewed by me. Medications Prior to Admission:  No current facility-administered medications on file prior to encounter.       Current Outpatient Medications on File Prior to Encounter   Medication Sig Dispense Refill    oxybutynin (DITROPAN-XL) 10 MG extended release tablet Take 1 tablet by mouth daily 30 tablet 3    metFORMIN (GLUCOPHAGE) 1000 MG tablet TAKE 1 TABLET BY MOUTH TWO  TIMES A DAY WITH MEALS 180 tablet 1    atorvastatin (LIPITOR) 40 MG tablet TAKE 1 TABLET BY MOUTH ONCE A DAY 90 tablet 1    carvedilol (COREG) 25 MG tablet TAKE 1 TABLET BY MOUTH TWO  TIMES DAILY WITH MEALS 180 tablet 1    clopidogrel (PLAVIX) 75 MG tablet TAKE 1 TABLET BY MOUTH  DAILY 90 tablet 1    amLODIPine (NORVASC) 5 MG tablet Take 1 tablet by mouth daily 90 tablet 1    FLUoxetine (PROZAC) 40 MG capsule TAKE 1 CAPSULE BY MOUTH  ONCE A DAY 90 capsule 1    quinapril (ACCUPRIL) 40 MG tablet TAKE 1 TABLET BY MOUTH ONCE A DAY 90 tablet 1  insulin lispro protamine & lispro (HUMALOG MIX 75/25 KWIKPEN) (75-25) 100 UNIT per ML SUPN injection pen 50 units in AM, 16 units with lunch, 10 units before dinner 25 pen 3    pantoprazole (PROTONIX) 40 MG tablet Take 1 tablet by mouth daily 90 tablet 3    TRUEPLUS PEN NEEDLES 31G X 6 MM MISC USE FOR INSULIN PENS 4 TIMES DAILY 100 each 1    Lancets MISC 1 each by Does not apply route 4 times daily 400 each 3    blood glucose monitor strips Test 4 times a day & as needed for symptoms of irregular blood glucose. 400 strip 3    Blood Glucose Monitoring Suppl (ACCU-CHEK WALTER PLUS) w/Device KIT USE TO TEST BLOOD SUGAR THREE TIMES DAILY Dx Code: E11.21 1 kit 0    blood glucose monitor kit and supplies Test 2 times a day & as needed for symptoms of irregular blood glucose. 1 kit 0    Insulin Syringe-Needle U-100 31G X 5/16\" 1 ML MISC 1 each by Does not apply route 2 times daily 200 each 3    aspirin 325 MG tablet Take 1 tablet by mouth daily.  30 tablet 0       Medications Prior to Admission: oxybutynin (DITROPAN-XL) 10 MG extended release tablet, Take 1 tablet by mouth daily  metFORMIN (GLUCOPHAGE) 1000 MG tablet, TAKE 1 TABLET BY MOUTH TWO  TIMES A DAY WITH MEALS  atorvastatin (LIPITOR) 40 MG tablet, TAKE 1 TABLET BY MOUTH ONCE A DAY  carvedilol (COREG) 25 MG tablet, TAKE 1 TABLET BY MOUTH TWO  TIMES DAILY WITH MEALS  clopidogrel (PLAVIX) 75 MG tablet, TAKE 1 TABLET BY MOUTH  DAILY  amLODIPine (NORVASC) 5 MG tablet, Take 1 tablet by mouth daily  FLUoxetine (PROZAC) 40 MG capsule, TAKE 1 CAPSULE BY MOUTH  ONCE A DAY  quinapril (ACCUPRIL) 40 MG tablet, TAKE 1 TABLET BY MOUTH ONCE A DAY  insulin lispro protamine & lispro (HUMALOG MIX 75/25 KWIKPEN) (75-25) 100 UNIT per ML SUPN injection pen, 50 units in AM, 16 units with lunch, 10 units before dinner  pantoprazole (PROTONIX) 40 MG tablet, Take 1 tablet by mouth daily  TRUEPLUS PEN NEEDLES 31G X 6 MM MISC, USE FOR INSULIN PENS 4 TIMES DAILY  Lancets MISC, 1 each 132/62   03/09/20 116/72     Patient Vitals for the past 24 hrs:   BP Temp Temp src Pulse Resp SpO2 Weight   09/04/20 0858 (!) 166/77 99 °F (37.2 °C) Oral 72 16 94 % --   09/04/20 0433 (!) 189/76 98.7 °F (37.1 °C) Oral 75 16 94 % --   09/04/20 0047 (!) 158/67 -- -- 67 -- -- --   09/04/20 0018 (!) 166/75 99 °F (37.2 °C) Oral 73 16 92 % 184 lb 11.9 oz (83.8 kg)   09/03/20 2220 (!) 186/67 99.2 °F (37.3 °C) Oral 67 20 95 % --   09/03/20 1629 (!) 168/71 97.5 °F (36.4 °C) Oral 77 16 93 % --       Intake/Output Summary (Last 24 hours) at 9/4/2020 1235  Last data filed at 9/4/2020 0438  Gross per 24 hour   Intake 1159 ml   Output 600 ml   Net 559 ml     Physical Exam  Vitals signs reviewed. Constitutional:       General: She is not in acute distress. Comments: Obese body habitus   HENT:      Head: Normocephalic and atraumatic. Right Ear: External ear normal.      Left Ear: External ear normal.      Mouth/Throat:      Mouth: Mucous membranes are not dry. Eyes:      General: No scleral icterus. Conjunctiva/sclera: Conjunctivae normal.   Neck:      Musculoskeletal: Neck supple. Thyroid: No thyroid mass. Vascular: No JVD. Trachea: Trachea normal.   Cardiovascular:      Rate and Rhythm: Normal rate and regular rhythm. Pulmonary:      Effort: Pulmonary effort is normal.      Breath sounds: Normal breath sounds. Abdominal:      General: Bowel sounds are normal.      Palpations: Abdomen is soft. Musculoskeletal:         General: No deformity. Right lower leg: Edema present. Left lower leg: Edema present. Comments: Trace    Skin:     General: Skin is warm and dry. Neurological:      Mental Status: She is alert and oriented to person, place, and time.    Psychiatric:         Behavior: Behavior normal.              DATA:  Diagnostic tests reviewed by me for today's visit:    Recent Labs     09/02/20  1629 09/03/20  0539   WBC 8.0 6.3   HCT 33.4* 30.1*    231     Iron Saturation:  No components found for: PERCENTFE  FERRITIN:  No results found for: FERRITIN  IRON:  No results found for: IRON  TIBC:  No results found for: TIBC    Recent Labs     09/02/20  1629        K 4.6        CO2 24    BUN 34*    CREATININE 1.9*      Recent Labs     09/02/20  1629 09/03/20  0539 09/04/20  0557   CALCIUM 9.7 8.7 8.3   PHOS  --  2.8 3.7     No results for input(s): PH, PCO2, PO2 in the last 72 hours.     Invalid input(s): Bartkari Luis    ABG:  No results found for: PH, PCO2, PO2, HCO3, BE, THGB, TCO2, O2SAT  VBG:    Lab Results   Component Value Date    PHVEN 7.395 09/03/2020    GAC7HSF 36.0 08/22/2018    BEVEN -1.1 08/22/2018    F0HCGTSO 100 08/22/2018       LDH:  No results found for: LDH  Uric Acid:  No results found for: LABURIC, URICACID    PT/INR:    Lab Results   Component Value Date    PROTIME 12.1 07/12/2018    INR 1.06 07/12/2018     Warfarin PT/INR:  No components found for: PTPATWAR, PTINRWAR  PTT:    Lab Results   Component Value Date    APTT 42.0 07/12/2018   [APTT}  Last 3 Troponin:    Lab Results   Component Value Date    TROPONINI <0.01 09/02/2020    TROPONINI <0.01 08/22/2018    TROPONINI <0.01 07/12/2018       U/A:    Lab Results   Component Value Date    NITRITE NEGATIVE 10/19/2017    COLORU Yellow 09/02/2020    PROTEINU 30 09/02/2020    PHUR 5.5 09/02/2020    WBCUA 46 09/02/2020    RBCUA 3 09/02/2020    BACTERIA RARE 08/19/2018    CLARITYU Clear 09/02/2020    SPECGRAV 1.020 09/02/2020    LEUKOCYTESUR MODERATE 09/02/2020    UROBILINOGEN 0.2 09/02/2020    BILIRUBINUR Negative 09/02/2020    BILIRUBINUR NEGATIVE 10/19/2017    BLOODU Negative 09/02/2020    GLUCOSEU 100 09/02/2020     Microalbumen/Creatinine ratio:  No components found for: RUCREAT  24 Hour Urine for Protein:  No components found for: RAWUPRO, UHRS3, XEQU23EP, UTV3  24 Hour Urine for Creatinine Clearance:  No components found for: CREAT4, UHRS10, UTV10  Urine Toxicology:  No components found for: IAMMENTA, IBARBIT, IBENZO, ICOCAINE, IMARTHC, IOPIATES, IPHENCYC    HgBA1c:    Lab Results   Component Value Date    LABA1C 8.6 09/03/2020     RPR:  No results found for: RPR  HIV:  No results found for: HIV  KAMINI:  No results found for: ANATITER, KAMINI  RF:  No results found for: RF  DSDNA:  No components found for: DNA  AMYLASE:    Lab Results   Component Value Date    AMYLASE 72 11/12/2012     LIPASE:    Lab Results   Component Value Date    LIPASE 25.52 11/12/2012     Fibrinogen Level:  No components found for: FIB       BELOW MENTIONED RADIOLOGY STUDY RESULTS BY ME:    Xr Chest Portable    Result Date: 9/2/2020  EXAMINATION: ONE XRAY VIEW OF THE CHEST 9/2/2020 1:22 pm COMPARISON: 08/22/2018 HISTORY: ORDERING SYSTEM PROVIDED HISTORY: SOB TECHNOLOGIST PROVIDED HISTORY: Reason for exam:->SOB FINDINGS: The cardial-pericardial silhouette is unremarkable in appearance. The lungs are clear. No pneumothorax is found. No free air is seen. No acute bony abnormality. Unremarkable portable chest radiograph.

## 2020-09-04 NOTE — PROGRESS NOTES
[facility] PROGRESS NOTE    9/4/2020 11:48 AM        Name: Katie Issa .               Admitted: 9/2/2020  Primary Care Provider: Marcela Kennedy MD (Tel: [phone])      Subjective:    Patient lying in bed feeling better still having some hyperglycemia and elevated blood pressures    Reviewed interval ancillary notes    Current Medications  amLODIPine (NORVASC) tablet 10 mg, Daily  lisinopril (PRINIVIL;ZESTRIL) tablet 10 mg, Daily  insulin glargine (LANTUS;BASAGLAR) injection pen 25 Units, Daily  insulin lispro (1 Unit Dial) 0-12 Units, TID WC  insulin lispro (1 Unit Dial) 0-6 Units, Nightly  calcium carbonate (TUMS) chewable tablet 500 mg, TID PRN  atorvastatin (LIPITOR) tablet 40 mg, Nightly  clopidogrel (PLAVIX) tablet 75 mg, Daily  FLUoxetine (PROZAC) capsule 40 mg, Daily  glucose (GLUTOSE) 40 % oral gel 15 g, PRN  dextrose 50 % IV solution, PRN  glucagon (rDNA) injection 1 mg, PRN  dextrose 5 % solution, PRN  cefTRIAXone (ROCEPHIN) 1 g in sterile water 10 mL IV syringe, Q24H  0.9 % sodium chloride infusion, Continuous  hydrALAZINE (APRESOLINE) injection 20 mg, Q8H  sodium chloride flush 0.9 % injection 10 mL, 2 times per day  sodium chloride flush 0.9 % injection 10 mL, PRN  polyethylene glycol (GLYCOLAX) packet 17 g, Daily PRN  promethazine (PHENERGAN) tablet 12.5 mg, Q6H PRN    Or  ondansetron (ZOFRAN) injection 4 mg, Q6H PRN  sodium chloride flush 0.9 % injection 10 mL, PRN  potassium chloride (KLOR-CON M) extended release tablet 40 mEq, PRN    Or  potassium bicarb-citric acid (EFFER-K) effervescent tablet 40 mEq, PRN    Or  potassium chloride 10 mEq/100 mL IVPB (Peripheral Line), PRN  magnesium sulfate 1 g in dextrose 5% 100 mL IVPB, PRN  acetaminophen (TYLENOL) tablet 650 mg, Q6H PRN    Or  acetaminophen (TYLENOL) suppository 650 mg, Q6H PRN  enoxaparin (LOVENOX) injection 30 mg, Daily        Objective:  BP (!) 166/77   Pulse 72   Temp 99 °F (37.2 °C) (Oral)   Resp 16   Ht 5' 3\" (1.6 m)   Wt 184 lb 11.9 oz (83.8 kg)   LMP  (LMP Unknown)   SpO2 94%   BMI 32.73 kg/m²     Intake/Output Summary (Last 24 hours) at 9/4/2020 1148  Last data filed at 9/4/2020 0438  Gross per 24 hour   Intake 1399 ml   Output 600 ml   Net 799 ml      Wt Readings from Last 3 Encounters:   09/04/20 184 lb 11.9 oz (83.8 kg)   07/09/20 186 lb (84.4 kg)   03/09/20 189 lb (85.7 kg)       General appearance:  Appears comfortable. AAOx3  HEENT: atraumatic, Pupils equal, muscous membranes moist, no masses appreciated  Cardiovascular: Regular rate and rhythm no murmurs appreciated  Respiratory: CTAB no wheezing  Gastrointestinal: Abdomen soft, non-tender, BS+  EXT: no edema  Neurology: no gross focal deficts  Psychiatry: Appropriate affect. Not agitated  Skin: Warm, dry, no rashes appreciated    Labs and Tests:  CBC:   Recent Labs     09/02/20  1629 09/03/20  0539   WBC 8.0 6.3   HGB 10.6* 9.7*    231     BMP:    Recent Labs     09/02/20  1629 09/03/20  0539 09/04/20  0557    140 141   K 4.6 4.2 3.6    108 107   CO2 24 21 22   BUN 34* 26* 15   CREATININE 1.9* 1.5* 1.3*   GLUCOSE 128* 257* 221*     Hepatic:   Recent Labs     09/02/20  1629 09/03/20  0539   AST 13* 11*   ALT <5* <5*   BILITOT 0.3 0.4   ALKPHOS 62 50     XR CHEST PORTABLE   Final Result   Unremarkable portable chest radiograph. Recent imaging reviewed    Problem List  Principal Problem:    Hypoglycemia  Active Problems:    DM type 2 (diabetes mellitus, type 2) (HCC)    GERD (gastroesophageal reflux disease)    Acute kidney injury superimposed on CKD ([facility]    UTI (urinary tract infection)  Resolved Problems:    * No resolved hospital problems.  *       Assessment & Plan:   dm2 with hypoglycemia- aic 8.6  Increase lantus and lispro    TOMSAZ with ckd3: improved    UTI: rocephin fu culture    Bradycardia: idc coreg improved cards signed off    Htn: add back acei titrate up norvasc      Diet: DIET CARDIAC; Carb Control: 5 carb choices (75 gms)/meal; No Added Salt (3-4 GM)  Code:Full Code  DVT PPXlovenox  Dispositionhome in am Jamir Melgoza MD   9/4/2020 11:48 AM

## 2020-09-05 LAB
ALBUMIN SERPL-MCNC: 3.7 G/DL (ref 3.4–5)
ANION GAP SERPL CALCULATED.3IONS-SCNC: 13 MMOL/L (ref 3–16)
BUN BLDV-MCNC: 15 MG/DL (ref 7–20)
CALCIUM SERPL-MCNC: 8.4 MG/DL (ref 8.3–10.6)
CHLORIDE BLD-SCNC: 108 MMOL/L (ref 99–110)
CO2: 20 MMOL/L (ref 21–32)
CREAT SERPL-MCNC: 1.2 MG/DL (ref 0.6–1.2)
GFR AFRICAN AMERICAN: 52
GFR NON-AFRICAN AMERICAN: 43
GLUCOSE BLD-MCNC: 265 MG/DL (ref 70–99)
GLUCOSE BLD-MCNC: 284 MG/DL (ref 70–99)
GLUCOSE BLD-MCNC: 386 MG/DL (ref 70–99)
GLUCOSE BLD-MCNC: 398 MG/DL (ref 70–99)
GLUCOSE BLD-MCNC: 398 MG/DL (ref 70–99)
GLUCOSE BLD-MCNC: 456 MG/DL (ref 70–99)
PERFORMED ON: ABNORMAL
PHOSPHORUS: 3.7 MG/DL (ref 2.5–4.9)
POTASSIUM SERPL-SCNC: 4.1 MMOL/L (ref 3.5–5.1)
SODIUM BLD-SCNC: 141 MMOL/L (ref 136–145)

## 2020-09-05 PROCEDURE — 6370000000 HC RX 637 (ALT 250 FOR IP): Performed by: INTERNAL MEDICINE

## 2020-09-05 PROCEDURE — 6360000002 HC RX W HCPCS: Performed by: HOSPITALIST

## 2020-09-05 PROCEDURE — 2580000003 HC RX 258: Performed by: INTERNAL MEDICINE

## 2020-09-05 PROCEDURE — 6360000002 HC RX W HCPCS: Performed by: INTERNAL MEDICINE

## 2020-09-05 PROCEDURE — 6370000000 HC RX 637 (ALT 250 FOR IP): Performed by: HOSPITALIST

## 2020-09-05 PROCEDURE — 1200000000 HC SEMI PRIVATE

## 2020-09-05 PROCEDURE — 80069 RENAL FUNCTION PANEL: CPT

## 2020-09-05 PROCEDURE — 36415 COLL VENOUS BLD VENIPUNCTURE: CPT

## 2020-09-05 RX ORDER — LISINOPRIL 20 MG/1
20 TABLET ORAL DAILY
Status: DISCONTINUED | OUTPATIENT
Start: 2020-09-06 | End: 2020-09-05

## 2020-09-05 RX ORDER — INSULIN LISPRO 100 [IU]/ML
0-18 INJECTION, SOLUTION INTRAVENOUS; SUBCUTANEOUS
Status: DISCONTINUED | OUTPATIENT
Start: 2020-09-05 | End: 2020-09-06 | Stop reason: HOSPADM

## 2020-09-05 RX ORDER — HYDRALAZINE HYDROCHLORIDE 25 MG/1
25 TABLET, FILM COATED ORAL EVERY 8 HOURS SCHEDULED
Status: DISCONTINUED | OUTPATIENT
Start: 2020-09-05 | End: 2020-09-06 | Stop reason: HOSPADM

## 2020-09-05 RX ORDER — INSULIN LISPRO 100 [IU]/ML
0-9 INJECTION, SOLUTION INTRAVENOUS; SUBCUTANEOUS NIGHTLY
Status: DISCONTINUED | OUTPATIENT
Start: 2020-09-05 | End: 2020-09-06 | Stop reason: HOSPADM

## 2020-09-05 RX ORDER — LISINOPRIL 10 MG/1
10 TABLET ORAL DAILY
Status: DISCONTINUED | OUTPATIENT
Start: 2020-09-06 | End: 2020-09-06 | Stop reason: HOSPADM

## 2020-09-05 RX ADMIN — HYDRALAZINE HYDROCHLORIDE 25 MG: 25 TABLET, FILM COATED ORAL at 16:47

## 2020-09-05 RX ADMIN — LISINOPRIL 10 MG: 10 TABLET ORAL at 08:48

## 2020-09-05 RX ADMIN — INSULIN LISPRO 9 UNITS: 100 INJECTION, SOLUTION INTRAVENOUS; SUBCUTANEOUS at 16:47

## 2020-09-05 RX ADMIN — HYDRALAZINE HYDROCHLORIDE 20 MG: 20 INJECTION INTRAMUSCULAR; INTRAVENOUS at 00:26

## 2020-09-05 RX ADMIN — INSULIN LISPRO 18 UNITS: 100 INJECTION, SOLUTION INTRAVENOUS; SUBCUTANEOUS at 12:22

## 2020-09-05 RX ADMIN — INSULIN LISPRO 5 UNITS: 100 INJECTION, SOLUTION INTRAVENOUS; SUBCUTANEOUS at 21:25

## 2020-09-05 RX ADMIN — Medication 10 ML: at 21:24

## 2020-09-05 RX ADMIN — DESMOPRESSIN ACETATE 40 MG: 0.2 TABLET ORAL at 21:24

## 2020-09-05 RX ADMIN — Medication 10 ML: at 08:56

## 2020-09-05 RX ADMIN — ACETAMINOPHEN 650 MG: 325 TABLET ORAL at 21:31

## 2020-09-05 RX ADMIN — INSULIN GLARGINE 10 UNITS: 100 INJECTION, SOLUTION SUBCUTANEOUS at 11:16

## 2020-09-05 RX ADMIN — AMLODIPINE BESYLATE 10 MG: 5 TABLET ORAL at 08:48

## 2020-09-05 RX ADMIN — ENOXAPARIN SODIUM 30 MG: 30 INJECTION SUBCUTANEOUS at 08:48

## 2020-09-05 RX ADMIN — FLUOXETINE 40 MG: 20 CAPSULE ORAL at 08:48

## 2020-09-05 RX ADMIN — HYDRALAZINE HYDROCHLORIDE 25 MG: 25 TABLET, FILM COATED ORAL at 21:24

## 2020-09-05 RX ADMIN — HYDRALAZINE HYDROCHLORIDE 20 MG: 20 INJECTION INTRAMUSCULAR; INTRAVENOUS at 08:48

## 2020-09-05 RX ADMIN — INSULIN LISPRO 10 UNITS: 100 INJECTION, SOLUTION INTRAVENOUS; SUBCUTANEOUS at 08:49

## 2020-09-05 RX ADMIN — CLOPIDOGREL BISULFATE 75 MG: 75 TABLET ORAL at 08:48

## 2020-09-05 ASSESSMENT — PAIN SCALES - GENERAL
PAINLEVEL_OUTOF10: 0

## 2020-09-05 NOTE — PROGRESS NOTES
100 [facility] PROGRESS NOTE    9/5/2020 11:38 AM        Name: Perlita Jean-Baptiste .               Admitted: 9/2/2020  Primary Care Provider: Mónica Romero MD (Tel: [phone])      Subjective:    Patient lying in bed feeling better stil has elevated sugars bp stil up    Reviewed interval ancillary notes    Current Medications  insulin glargine (LANTUS;BASAGLAR) injection pen 10 Units, Nightly  insulin lispro (1 Unit Dial) 0-18 Units, TID WC  insulin lispro (1 Unit Dial) 0-9 Units, Nightly  [START ON 9/6/2020] lisinopril (PRINIVIL;ZESTRIL) tablet 20 mg, Daily  [START ON 9/6/2020] insulin glargine (LANTUS;BASAGLAR) injection pen 25 Units, Daily  amLODIPine (NORVASC) tablet 10 mg, Daily  calcium carbonate (TUMS) chewable tablet 500 mg, TID PRN  atorvastatin (LIPITOR) tablet 40 mg, Nightly  clopidogrel (PLAVIX) tablet 75 mg, Daily  FLUoxetine (PROZAC) capsule 40 mg, Daily  glucose (GLUTOSE) 40 % oral gel 15 g, PRN  dextrose 50 % IV solution, PRN  glucagon (rDNA) injection 1 mg, PRN  dextrose 5 % solution, PRN  0.9 % sodium chloride infusion, Continuous  hydrALAZINE (APRESOLINE) injection 20 mg, Q8H  sodium chloride flush 0.9 % injection 10 mL, 2 times per day  sodium chloride flush 0.9 % injection 10 mL, PRN  polyethylene glycol (GLYCOLAX) packet 17 g, Daily PRN  promethazine (PHENERGAN) tablet 12.5 mg, Q6H PRN    Or  ondansetron (ZOFRAN) injection 4 mg, Q6H PRN  sodium chloride flush 0.9 % injection 10 mL, PRN  potassium chloride (KLOR-CON M) extended release tablet 40 mEq, PRN    Or  potassium bicarb-citric acid (EFFER-K) effervescent tablet 40 mEq, PRN    Or  potassium chloride 10 mEq/100 mL IVPB (Peripheral Line), PRN  magnesium sulfate 1 g in dextrose 5% 100 mL IVPB, PRN  acetaminophen (TYLENOL) tablet 650 mg, Q6H PRN    Or  acetaminophen (TYLENOL) suppository 650 mg, Q6H PRN  enoxaparin (LOVENOX) injection 30 mg, Daily        Objective:  BP (!) 179/68   Pulse 77   Temp 99.1 °F (37.3 °C) (Oral)   Resp 18   Ht 5' 3\" (1.6 m)   Wt 185 lb 3 oz (84 kg)   LMP  (LMP Unknown)   SpO2 95%   BMI 32.80 kg/m²     Intake/Output Summary (Last 24 hours) at 9/5/2020 1138  Last data filed at 9/5/2020 0558  Gross per 24 hour   Intake 1675.6 ml   Output --   Net 1675.6 ml      Wt Readings from Last 3 Encounters:   09/05/20 185 lb 3 oz (84 kg)   07/09/20 186 lb (84.4 kg)   03/09/20 189 lb (85.7 kg)       General appearance:  Appears comfortable. AAOx3  HEENT: atraumatic, Pupils equal, muscous membranes moist, no masses appreciated  Cardiovascular: Regular rate and rhythm no murmurs appreciated  Respiratory: CTAB no wheezing  Gastrointestinal: Abdomen soft, non-tender, BS+  EXT: no edema  Neurology: no gross focal deficts  Psychiatry: Appropriate affect. Not agitated  Skin: Warm, dry, no rashes appreciated    Labs and Tests:  CBC:   Recent Labs     09/02/20  1629 09/03/20  0539   WBC 8.0 6.3   HGB 10.6* 9.7*    231     BMP:    Recent Labs     09/03/20  0539 09/04/20  0557 09/05/20  0532    141 141   K 4.2 3.6 4.1    107 108   CO2 21 22 20*   BUN 26* 15 15   CREATININE 1.5* 1.3* 1.2   GLUCOSE 257* 221* 398*     Hepatic:   Recent Labs     09/02/20  1629 09/03/20  0539   AST 13* 11*   ALT <5* <5*   BILITOT 0.3 0.4   ALKPHOS 62 50     XR CHEST PORTABLE   Final Result   Unremarkable portable chest radiograph. Recent imaging reviewed    Problem List  Principal Problem:    Hypoglycemia  Active Problems:    DM type 2 (diabetes mellitus, type 2) (HCC)    GERD (gastroesophageal reflux disease)    Acute kidney injury superimposed on CKD ([facility]    UTI (urinary tract infection)  Resolved Problems:    * No resolved hospital problems.  *       Assessment & Plan:   dm2 with hypoglycemia- aic 8.6  We will give extra dose of Lantus today and change insulin regimen to high-dose    TOMASZ with ckd3: improved    Care ruled out

## 2020-09-05 NOTE — PROGRESS NOTES
Assessment and med pass complete. Meds passed whole with water. Resting in bed, ambulates independently, informed to call if needing assist. Got fresh ice water and milk per request. Denies other needs, call light in reach.

## 2020-09-05 NOTE — PROGRESS NOTES
MD Monique Ovalle MD Gwyneth Barer, MD               Office: [phone]                      Fax: [phone]             [address] [place] [address]                   NEPHROLOGY INPATIENT PROGRESS NOTE:     PATIENT NAME: Shelly Branch  : 1940  MRN: [id number]       RECOMMENDATIONS:   -  TOMASZ resolved. No further NSS. Continue off ACEI for today. Can restart tomorrow if crea stable. -  no need for aggressive bp control at this time especially with her age. -  Switch to po Hydralazine.   -  Stable from renal perspective. Okay for D/C and f/u in office in 3 weeks. 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 - Unremarkable portable chest radiograph. Associated problems:   - lytes stable  - BP uncontrolled   - Anemia: mild -      - H/O CKD-3 - likely d/to DM/HTN, no previous renal eval,   : would need outpt renal f/u   : (+) spot MACr - ACEI tomorrow    Other problems: Management per primary and other consulting teams. Hospital Problems           Last Modified POA    * (Principal) Hypoglycemia 2020 Yes    DM type 2 (diabetes mellitus, type 2) ([facility] [address] 2020 Yes    GERD (gastroesophageal reflux disease) 2020 Yes    Acute kidney injury superimposed on CKD ([facility] [address] 2020 Yes    UTI (urinary tract infection) 2020 Yes             Suzan Borden MD       [facility] [address] [place]: [phone] or [facility]  Fax: [phone]        ========================================================   ========================================================     Subjective:  No complaints.       Past medical, Surgical, Social, Family medical history reviewed by me.     PAST MEDICAL HISTORY:   Past Medical History:   Diagnosis Date    Cataract     Cystocele without mention of uterine prolapse     Depression     DM type 2 (diabetes mellitus, type 2) (HCC)     Dysuria     Generalized osteoarthritis of multiple sites     HTN (hypertension)     Hyperlipidemia     Menopause     Nephropathy, diabetic ([facility] [address]     Osteopenia     Stroke ([facility] [address]      PAST SURGICAL HISTORY:   Past Surgical History:   Procedure Laterality Date    CARPAL TUNNEL RELEASE  2000    Right     CATARACT REMOVAL  2006    Bilateral Cataracts     FAMILY HISTORY:   Family History   Problem Relation Age of Onset    Stroke Father     Mental Illness Brother         schizophrenia    Cancer Brother         Lung Cancer     SOCIAL HISTORY:   Social History     Socioeconomic History    Marital status:      Spouse name: None    Number of children: None    Years of education: None    Highest education level: None   Occupational History    Occupation: Retired   Social Needs    Financial resource strain: None    Food insecurity     Worry: None     Inability: None    Transportation needs     Medical: None     Non-medical: None   Tobacco Use    Smoking status: Former Smoker     Packs/day: 0.50     Years: 15.00     Pack years: 7.50     Last attempt to quit: 2002     Years since quittin.6    Smokeless tobacco: Never Used    Tobacco comment: congrats   Substance and Sexual Activity    Alcohol use: No    Drug use: No    Sexual activity: None   Lifestyle    Physical activity     Days per week: None     Minutes per session: None    Stress: None   Relationships    Social connections     Talks on phone: None     Gets together: None     Attends Congregational service: None     Active member of club or organization: None     Attends meetings of clubs or organizations: None     Relationship status: None    Intimate partner violence     Fear of current or ex partner: None     Emotionally abused: None     Physically abused: None     Forced sexual activity: None   Other Topics Concern    None   Social History Narrative    None          MEDICATIONS: reviewed by me. Medications Prior to Admission:  No current facility-administered medications on file prior to encounter. Current Outpatient Medications on File Prior to Encounter   Medication Sig Dispense Refill    oxybutynin (DITROPAN-XL) 10 MG extended release tablet Take 1 tablet by mouth daily 30 tablet 3    metFORMIN (GLUCOPHAGE) 1000 MG tablet TAKE 1 TABLET BY MOUTH TWO  TIMES A DAY WITH MEALS 180 tablet 1    atorvastatin (LIPITOR) 40 MG tablet TAKE 1 TABLET BY MOUTH ONCE A DAY 90 tablet 1    carvedilol (COREG) 25 MG tablet TAKE 1 TABLET BY MOUTH TWO  TIMES DAILY WITH MEALS 180 tablet 1    clopidogrel (PLAVIX) 75 MG tablet TAKE 1 TABLET BY MOUTH  DAILY 90 tablet 1    amLODIPine (NORVASC) 5 MG tablet Take 1 tablet by mouth daily 90 tablet 1    FLUoxetine (PROZAC) 40 MG capsule TAKE 1 CAPSULE BY MOUTH  ONCE A DAY 90 capsule 1    quinapril (ACCUPRIL) 40 MG tablet TAKE 1 TABLET BY MOUTH ONCE A DAY 90 tablet 1    insulin lispro protamine & lispro (HUMALOG MIX 75/25 KWIKPEN) (75-25) 100 UNIT per ML SUPN injection pen 50 units in AM, 16 units with lunch, 10 units before dinner 25 pen 3    pantoprazole (PROTONIX) 40 MG tablet Take 1 tablet by mouth daily 90 tablet 3    TRUEPLUS PEN NEEDLES 31G X 6 MM MISC USE FOR INSULIN PENS 4 TIMES DAILY 100 each 1    Lancets MISC 1 each by Does not apply route 4 times daily 400 each 3    blood glucose monitor strips Test 4 times a day & as needed for symptoms of irregular blood glucose. 400 strip 3    Blood Glucose Monitoring Suppl (ACCU-CHEK WALTER PLUS) w/Device KIT USE TO TEST BLOOD SUGAR THREE TIMES DAILY Dx Code: E11.21 1 kit 0    blood glucose monitor kit and supplies Test 2 times a day & as needed for symptoms of irregular blood glucose.  1 kit 0    Insulin Syringe-Needle U-100 31G X 5/16\" 1 ML MISC 1 each by Does not apply route 2 times daily 200 each 3    aspirin 325 MG tablet Take 1 tablet by mouth daily. 30 tablet 0       Medications Prior to Admission: oxybutynin (DITROPAN-XL) 10 MG extended release tablet, Take 1 tablet by mouth daily  metFORMIN (GLUCOPHAGE) 1000 MG tablet, TAKE 1 TABLET BY MOUTH TWO  TIMES A DAY WITH MEALS  atorvastatin (LIPITOR) 40 MG tablet, TAKE 1 TABLET BY MOUTH ONCE A DAY  carvedilol (COREG) 25 MG tablet, TAKE 1 TABLET BY MOUTH TWO  TIMES DAILY WITH MEALS  clopidogrel (PLAVIX) 75 MG tablet, TAKE 1 TABLET BY MOUTH  DAILY  amLODIPine (NORVASC) 5 MG tablet, Take 1 tablet by mouth daily  FLUoxetine (PROZAC) 40 MG capsule, TAKE 1 CAPSULE BY MOUTH  ONCE A DAY  quinapril (ACCUPRIL) 40 MG tablet, TAKE 1 TABLET BY MOUTH ONCE A DAY  insulin lispro protamine & lispro (HUMALOG MIX 75/25 KWIKPEN) (75-25) 100 UNIT per ML SUPN injection pen, 50 units in AM, 16 units with lunch, 10 units before dinner  pantoprazole (PROTONIX) 40 MG tablet, Take 1 tablet by mouth daily  TRUEPLUS PEN NEEDLES 31G X 6 MM MISC, USE FOR INSULIN PENS 4 TIMES DAILY  Lancets MISC, 1 each by Does not apply route 4 times daily  blood glucose monitor strips, Test 4 times a day & as needed for symptoms of irregular blood glucose. Blood Glucose Monitoring Suppl (ACCU-CHEK WALTER PLUS) w/Device KIT, USE TO TEST BLOOD SUGAR THREE TIMES DAILY Dx Code: E11.21  blood glucose monitor kit and supplies, Test 2 times a day & as needed for symptoms of irregular blood glucose. Insulin Syringe-Needle U-100 31G X 5/16\" 1 ML MISC, 1 each by Does not apply route 2 times daily  aspirin 325 MG tablet, Take 1 tablet by mouth daily.      Scheduled Meds:   insulin glargine  10 Units Subcutaneous Nightly    insulin lispro  0-18 Units Subcutaneous TID     insulin lispro  0-9 Units Subcutaneous Nightly    [START ON 9/6/2020] lisinopril  20 mg Oral Daily    [START ON 9/6/2020] insulin glargine  25 Units Subcutaneous Daily    amLODIPine  10 mg Oral Daily    atorvastatin  40 mg Oral Nightly    clopidogrel  75 mg Oral Daily    FLUoxetine  40 mg Oral Daily    hydrALAZINE  20 mg Intravenous Q8H    sodium chloride flush  10 mL Intravenous 2 times per day    enoxaparin  30 mg Subcutaneous Daily     Continuous Infusions:   dextrose      sodium chloride 25 mL/hr at 09/05/20 0558     PRN Meds:.calcium carbonate, glucose, dextrose, glucagon (rDNA), dextrose, sodium chloride flush, polyethylene glycol, promethazine **OR** ondansetron, sodium chloride flush, potassium chloride **OR** potassium alternative oral replacement **OR** potassium chloride, magnesium sulfate, acetaminophen **OR** acetaminophen      Allergies reviewed by me: Patient has no known allergies. REVIEW OF SYSTEMS:  As mentioned in chief complaint and HPI , Subjective   All other 10-point review of systems: negative. PHYSICAL EXAM:  Recent vital signs and recent I/Os reviewed by me. Wt Readings from Last 3 Encounters:   09/05/20 185 lb 3 oz (84 kg)   07/09/20 186 lb (84.4 kg)   03/09/20 189 lb (85.7 kg)     BP Readings from Last 3 Encounters:   09/05/20 (!) 156/63   07/09/20 132/62   03/09/20 116/72     Patient Vitals for the past 24 hrs:   BP Temp Temp src Pulse Resp SpO2 Weight   09/05/20 1307 (!) 156/63 97.2 °F (36.2 °C) Oral 81 18 93 % --   09/05/20 0740 (!) 179/68 -- -- -- -- -- --   09/05/20 0738 (!) 180/49 99.1 °F (37.3 °C) Oral 77 18 95 % --   09/05/20 0312 (!) 154/66 99.1 °F (37.3 °C) Oral 77 18 93 % 185 lb 3 oz (84 kg)   09/05/20 0103 (!) 148/57 -- -- 63 -- -- --   09/05/20 0023 131/66 98.7 °F (37.1 °C) Oral 59 20 92 % --   09/04/20 1956 (!) 156/71 99.3 °F (37.4 °C) Oral 73 16 92 % --   09/04/20 1707 (!) 153/93 99.7 °F (37.6 °C) Oral 89 16 95 % --       Intake/Output Summary (Last 24 hours) at 9/5/2020 1316  Last data filed at 9/5/2020 0558  Gross per 24 hour   Intake 1675.6 ml   Output --   Net 1675.6 ml     Physical Exam  Vitals signs reviewed.    Constitutional: 08/22/2018       LDH:  No results found for: LDH  Uric Acid:  No results found for: LABURIC, URICACID    PT/INR:    Lab Results   Component Value Date    PROTIME 12.1 07/12/2018    INR 1.06 07/12/2018     Warfarin PT/INR:  No components found for: Eli Palma  PTT:    Lab Results   Component Value Date    APTT 42.0 07/12/2018   [APTT}  Last 3 Troponin:    Lab Results   Component Value Date    TROPONINI <0.01 09/02/2020    TROPONINI <0.01 08/22/2018    TROPONINI <0.01 07/12/2018       U/A:    Lab Results   Component Value Date    NITRITE NEGATIVE 10/19/2017    COLORU Yellow 09/02/2020    PROTEINU 30 09/02/2020    PHUR 5.5 09/02/2020    WBCUA 46 09/02/2020    RBCUA 3 09/02/2020    BACTERIA RARE 08/19/2018    CLARITYU Clear 09/02/2020    SPECGRAV 1.020 09/02/2020    LEUKOCYTESUR MODERATE 09/02/2020    UROBILINOGEN 0.2 09/02/2020    BILIRUBINUR Negative 09/02/2020    BILIRUBINUR NEGATIVE 10/19/2017    BLOODU Negative 09/02/2020    GLUCOSEU 100 09/02/2020     Microalbumen/Creatinine ratio:  No components found for: RUCREAT  24 Hour Urine for Protein:  No components found for: RAWUPRO, UHRS3, MBSJ24CC, UTV3  24 Hour Urine for Creatinine Clearance:  No components found for: CREAT4, UHRS10, UTV10  Urine Toxicology:  No components found for: IAMMENTA, IBARBIT, IBENZO, ICOCAINE, IMARTHC, IOPIATES, IPHENCYC    HgBA1c:    Lab Results   Component Value Date    LABA1C 8.6 09/03/2020     RPR:  No results found for: RPR  HIV:  No results found for: HIV  KAMINI:  No results found for: ANATITER, KAMINI  RF:  No results found for: RF  DSDNA:  No components found for: DNA  AMYLASE:    Lab Results   Component Value Date    AMYLASE 72 11/12/2012     LIPASE:    Lab Results   Component Value Date    LIPASE 25.52 11/12/2012     Fibrinogen Level:  No components found for: FIB       BELOW MENTIONED RADIOLOGY STUDY RESULTS BY ME:    Xr Chest Portable    Result Date: 9/2/2020  EXAMINATION: ONE XRAY VIEW OF THE CHEST 9/2/2020 1:22 pm COMPARISON: 08/22/2018 HISTORY: ORDERING SYSTEM PROVIDED HISTORY: SOB TECHNOLOGIST PROVIDED HISTORY: Reason for exam:->SOB FINDINGS: The cardial-pericardial silhouette is unremarkable in appearance. The lungs are clear. No pneumothorax is found. No free air is seen. No acute bony abnormality. Unremarkable portable chest radiograph.        Douglas Carballo MD

## 2020-09-06 VITALS
RESPIRATION RATE: 16 BRPM | HEIGHT: 63 IN | BODY MASS INDEX: 31.37 KG/M2 | WEIGHT: 177.03 LBS | SYSTOLIC BLOOD PRESSURE: 151 MMHG | TEMPERATURE: 98.5 F | DIASTOLIC BLOOD PRESSURE: 75 MMHG | OXYGEN SATURATION: 95 % | HEART RATE: 86 BPM

## 2020-09-06 LAB
ALBUMIN SERPL-MCNC: 3.4 G/DL (ref 3.4–5)
ANION GAP SERPL CALCULATED.3IONS-SCNC: 13 MMOL/L (ref 3–16)
BUN BLDV-MCNC: 17 MG/DL (ref 7–20)
CALCIUM SERPL-MCNC: 8.6 MG/DL (ref 8.3–10.6)
CHLORIDE BLD-SCNC: 108 MMOL/L (ref 99–110)
CO2: 21 MMOL/L (ref 21–32)
CREAT SERPL-MCNC: 1.2 MG/DL (ref 0.6–1.2)
GFR AFRICAN AMERICAN: 52
GFR NON-AFRICAN AMERICAN: 43
GLUCOSE BLD-MCNC: 221 MG/DL (ref 70–99)
GLUCOSE BLD-MCNC: 248 MG/DL (ref 70–99)
GLUCOSE BLD-MCNC: 355 MG/DL (ref 70–99)
PERFORMED ON: ABNORMAL
PERFORMED ON: ABNORMAL
PHOSPHORUS: 4 MG/DL (ref 2.5–4.9)
POTASSIUM SERPL-SCNC: 3.5 MMOL/L (ref 3.5–5.1)
SODIUM BLD-SCNC: 142 MMOL/L (ref 136–145)

## 2020-09-06 PROCEDURE — 6370000000 HC RX 637 (ALT 250 FOR IP): Performed by: INTERNAL MEDICINE

## 2020-09-06 PROCEDURE — 36415 COLL VENOUS BLD VENIPUNCTURE: CPT

## 2020-09-06 PROCEDURE — 6360000002 HC RX W HCPCS: Performed by: HOSPITALIST

## 2020-09-06 PROCEDURE — 80069 RENAL FUNCTION PANEL: CPT

## 2020-09-06 PROCEDURE — 6370000000 HC RX 637 (ALT 250 FOR IP): Performed by: HOSPITALIST

## 2020-09-06 PROCEDURE — 2580000003 HC RX 258: Performed by: INTERNAL MEDICINE

## 2020-09-06 RX ORDER — AMLODIPINE BESYLATE 10 MG/1
10 TABLET ORAL DAILY
Qty: 30 TABLET | Refills: 3 | Status: SHIPPED | OUTPATIENT
Start: 2020-09-07 | End: 2020-12-10 | Stop reason: SDUPTHER

## 2020-09-06 RX ADMIN — Medication 10 ML: at 08:15

## 2020-09-06 RX ADMIN — AMLODIPINE BESYLATE 10 MG: 5 TABLET ORAL at 08:15

## 2020-09-06 RX ADMIN — ENOXAPARIN SODIUM 30 MG: 30 INJECTION SUBCUTANEOUS at 08:15

## 2020-09-06 RX ADMIN — HYDRALAZINE HYDROCHLORIDE 25 MG: 25 TABLET, FILM COATED ORAL at 05:57

## 2020-09-06 RX ADMIN — INSULIN LISPRO 15 UNITS: 100 INJECTION, SOLUTION INTRAVENOUS; SUBCUTANEOUS at 11:48

## 2020-09-06 RX ADMIN — CLOPIDOGREL BISULFATE 75 MG: 75 TABLET ORAL at 08:15

## 2020-09-06 RX ADMIN — LISINOPRIL 10 MG: 10 TABLET ORAL at 08:14

## 2020-09-06 RX ADMIN — INSULIN LISPRO 6 UNITS: 100 INJECTION, SOLUTION INTRAVENOUS; SUBCUTANEOUS at 08:15

## 2020-09-06 RX ADMIN — FLUOXETINE 40 MG: 20 CAPSULE ORAL at 08:14

## 2020-09-06 ASSESSMENT — PAIN SCALES - GENERAL
PAINLEVEL_OUTOF10: 0

## 2020-09-06 NOTE — DISCHARGE SUMMARY
Hospital Medicine Discharge Summary    Patient: Canelo Dykes     Gender: female  : 1940   Age: [de-identified] y.o. MRN: [id number]    Admitting Physician: Swapnil Goodwin MD  Discharge Physician: Shamar Collado MD     Code Status: Full Code     Admit Date: 2020   Discharge Date:   2020    Disposition:  Home  Time spent arranging discharge: 35 minutes    Discharge Diagnoses: Active Hospital Problems    Diagnosis Date Noted    DM type 2 [facility]di[facility] mellitus[facility] type 2) [facility] [E11.9] 2011     Priority: High    Hypoglycemia [E16.2] 2020    Acute kidney injury superimposed on CKD ([facility] [address] [N17.9, N18.9] 2020    GERD (gastroesophageal reflux disease) [K21.9] 2012   UTI r/o cultures negative    bradycardia    Condition at Discharge:  Stable    Hospital Course:   Patient was admitted to the hospital with diabetes mellitus with hypoglycemia required D5W drip. This resolved likely secondary to TOMASZ which also resolved with IV fluids. Patient was hyperglcemic mostly on the floor and a1c has trended up since July was discharged on home insuling regimen as hypoglycmeia likely due to [facility], was asked to monitor sugars closely and if any further hypoglycemia to call pcp asap. Patient initially thought to have UTI was started on Rocephin but cultures came back negative and antibiotic were discontinued. Patient did have bradycardia which resolved with discontinuation of Coreg. Patient was seen by cardiology and cleared for discharge. Patient should stay off of beta-blockers. Discharge Exam:    BP (!) 151/75   Pulse 86   Temp 98.5 °F (36.9 °C) (Oral)   Resp 16   Ht 5' 3\" (1.6 m)   Wt 177 lb 0.5 oz (80.3 kg)   LMP  (LMP Unknown)   SpO2 95%   BMI 31.36 kg/m²   General appearance:  Appears comfortable.  AAOx3  HEENT: atraumatic, Pupils equal, muscous membranes moist, no masses appreciated  Cardiovascular: Regular rate and rhythm no murmurs appreciated  Respiratory: Mom Deneen   long-term current use of insulin ([facility])      pantoprazole (PROTONIX) 40 MG tablet Take 1 tablet by mouth daily  Qty: 90 tablet, Refills: 3    Associated Diagnoses: Gastroesophageal reflux disease without esophagitis      TRUEPLUS PEN NEEDLES 31G X 6 MM MISC USE FOR INSULIN PENS 4 TIMES DAILY  Qty: 100 each, Refills: 1      Lancets MISC 1 each by Does not apply route 4 times daily  Qty: 400 each, Refills: 3    Associated Diagnoses: Type 2 diabetes mellitus with diabetic nephropathy, with long-term current use of insulin ([facility])      blood glucose monitor strips Test 4 times a day & as needed for symptoms of irregular blood glucose. Qty: 400 strip, Refills: 3    Associated Diagnoses: Type 2 diabetes mellitus with diabetic nephropathy, with long-term current use of insulin ([facility])      Blood Glucose Monitoring Suppl (ACCU-CHEK WALTER PLUS) w/Device KIT USE TO TEST BLOOD SUGAR THREE TIMES DAILY Dx Code: E11.21  Qty: 1 kit, Refills: 0    Associated Diagnoses: Type 2 diabetes mellitus with diabetic nephropathy, with long-term current use of insulin ([facility])      blood glucose monitor kit and supplies Test 2 times a day & as needed for symptoms of irregular blood glucose. Qty: 1 kit, Refills: 0    Associated Diagnoses: Type 2 diabetes mellitus with diabetic nephropathy, with long-term current use of insulin ([facility])      Insulin Syringe-Needle U-100 31G X 5/16\" 1 ML MISC 1 each by Does not apply route 2 times daily  Qty: 200 each, Refills: 3    Associated Diagnoses: Type 2 diabetes mellitus with diabetic nephropathy, with long-term current use of insulin ([facility])      aspirin 325 MG tablet Take 1 tablet by mouth daily. Qty: 30 tablet, Refills: 0           Current Discharge Medication List      STOP taking these medications       carvedilol (COREG) 25 MG tablet Comments:   Reason for Stopping:               Labs:  For convenience and continuity at follow-up the following most recent labs are provided:    Lab Results   Component Value Date    WBC 6.3 09/03/2020    HGB 9.7 09/03/2020    HCT 30.1 09/03/2020    MCV 84.0 09/03/2020     09/03/2020     09/06/2020    K 3.5 09/06/2020    K 4.2 09/03/2020     09/06/2020    CO2 21 09/06/2020    BUN 17 09/06/2020    CREATININE 1.2 09/06/2020    CALCIUM 8.6 09/06/2020    PHOS 4.0 09/06/2020    ALKPHOS 50 09/03/2020    ALT <5 09/03/2020    AST 11 09/03/2020    BILITOT 0.4 09/03/2020    LABALBU 3.4 09/06/2020    LDLCALC 59 12/10/2019    TRIG 87 12/10/2019     Lab Results   Component Value Date    INR 1.06 07/12/2018    INR 1.02 07/01/2013    INR 1.03 02/16/2010       Radiology:  Xr Chest Portable    Result Date: 9/2/2020  EXAMINATION: ONE XRAY VIEW OF THE CHEST 9/2/2020 1:22 pm COMPARISON: 08/22/2018 HISTORY: ORDERING SYSTEM PROVIDED HISTORY: SOB TECHNOLOGIST PROVIDED HISTORY: Reason for exam:->SOB FINDINGS: The cardial-pericardial silhouette is unremarkable in appearance. The lungs are clear. No pneumothorax is found. No free air is seen. No acute bony abnormality. Unremarkable portable chest radiograph.          Signed:    Dat Erazo MD   9/6/2020

## 2020-09-06 NOTE — CARE COORDINATION
CM checked with pt's RN Liza and pt has no d/c needs.      Patient discharged 9/6/2020 to home   All discharge needs met per case management    Rula Silver RN, BSN  [phone]

## 2020-09-06 NOTE — PROGRESS NOTES
Data- discharge order received, pt verbalized agreement to discharge, disposition to previous residence, no needs for HHC/DME. Action- discharge instructions prepared and given to pt, pt verbalized understanding. Medication information packet given r/t NEW and/or CHANGED prescriptions emphasizing name/purpose/side effects, pt verbalized understanding. Discharge instruction summary: Diet- carb control, Activity- as tolerated, Primary Care Physician as follows: Fady Lujan -[phone] f/u appointment 1 week, prescription medications filled sent to pts pharmacy. Response- Pt belongings gathered, IV removed. Disposition is home (no HHC/DME needs), transported with belongings, taken to lobby via w/c w/ this RN, no complications.

## 2020-09-06 NOTE — PROGRESS NOTES
MD Velma Courtney MD Arlean Hinders, MD               Office: [phone]                      Fax: [phone]             [facility]                   NEPHROLOGY INPATIENT PROGRESS NOTE:     PATIENT NAME: Lorraine Dillard  : 1940  MRN: [id number]       RECOMMENDATIONS:   -  TOMASZ resolved. No further NSS. Restart ACEI today. -  no need for aggressive bp control at this time especially with her age. -  Switched to po Hydralazine.   -  Stable from renal perspective. Okay for D/C and f/u in office in 3 weeks. Follow bp. 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 - Unremarkable portable chest radiograph. Associated problems:   - lytes stable  - BP uncontrolled. Follow with addition of ACEI. - Anemia: mild -      - H/O CKD-3 - likely d/to DM/HTN, no previous renal eval,   : would need outpt renal f/u   : (+) spot MACr - ACEI today    Other problems: Management per primary and other consulting teams. Hospital Problems           Last Modified POA    * (Principal) Hypoglycemia 2020 Yes    DM type 2 (diabetes mellitus, type 2) ([facility] [address] 2020 Yes    GERD (gastroesophageal reflux disease) 2020 Yes    Acute kidney injury superimposed on CKD ([facility] [address] 2020 Yes    UTI (urinary tract infection) 2020 Yes             John Gilbert MD       [facility] [address] [place] [address] [place]: [phone] or Via "[employer]  Fax: [phone]        ========================================================   ========================================================     Subjective:  No complaints.       Past medical, Surgical, Social, Family medical history reviewed by me.     PAST MEDICAL HISTORY:   Past Medical History:   Diagnosis Date    Cataract     Cystocele without mention of uterine prolapse     Depression     DM type 2 (diabetes mellitus, type 2) (HCC)     Dysuria     Generalized osteoarthritis of multiple sites     HTN (hypertension)     Hyperlipidemia     Menopause     Nephropathy, diabetic ([facility] [address]     Osteopenia     Stroke ([facility] [address]      PAST SURGICAL HISTORY:   Past Surgical History:   Procedure Laterality Date    CARPAL TUNNEL RELEASE  2000    Right     CATARACT REMOVAL  2006    Bilateral Cataracts     FAMILY HISTORY:   Family History   Problem Relation Age of Onset    Stroke Father     Mental Illness Brother         schizophrenia    Cancer Brother         Lung Cancer     SOCIAL HISTORY:   Social History     Socioeconomic History    Marital status:      Spouse name: None    Number of children: None    Years of education: None    Highest education level: None   Occupational History    Occupation: Retired   Social Needs    Financial resource strain: None    Food insecurity     Worry: None     Inability: None    Transportation needs     Medical: None     Non-medical: None   Tobacco Use    Smoking status: Former Smoker     Packs/day: 0.50     Years: 15.00     Pack years: 7.50     Last attempt to quit: 2002     Years since quittin.6    Smokeless tobacco: Never Used    Tobacco comment: congrats   Substance and Sexual Activity    Alcohol use: No    Drug use: No    Sexual activity: None   Lifestyle    Physical activity     Days per week: None     Minutes per session: None    Stress: None   Relationships    Social connections     Talks on phone: None     Gets together: None     Attends Sikh service: None     Active member of club or organization: None     Attends meetings of clubs or organizations: None     Relationship status: None    Intimate partner violence     Fear of current or ex partner: None     Emotionally abused: None     Physically abused: None     Forced sexual activity: None   Other Topics Concern    None   Social History Narrative    None          MEDICATIONS: reviewed by me. Medications Prior to Admission:  No current facility-administered medications on file prior to encounter. Current Outpatient Medications on File Prior to Encounter   Medication Sig Dispense Refill    oxybutynin (DITROPAN-XL) 10 MG extended release tablet Take 1 tablet by mouth daily 30 tablet 3    metFORMIN (GLUCOPHAGE) 1000 MG tablet TAKE 1 TABLET BY MOUTH TWO  TIMES A DAY WITH MEALS 180 tablet 1    atorvastatin (LIPITOR) 40 MG tablet TAKE 1 TABLET BY MOUTH ONCE A DAY 90 tablet 1    clopidogrel (PLAVIX) 75 MG tablet TAKE 1 TABLET BY MOUTH  DAILY 90 tablet 1    FLUoxetine (PROZAC) 40 MG capsule TAKE 1 CAPSULE BY MOUTH  ONCE A DAY 90 capsule 1    quinapril (ACCUPRIL) 40 MG tablet TAKE 1 TABLET BY MOUTH ONCE A DAY 90 tablet 1    insulin lispro protamine & lispro (HUMALOG MIX 75/25 KWIKPEN) (75-25) 100 UNIT per ML SUPN injection pen 50 units in AM, 16 units with lunch, 10 units before dinner 25 pen 3    pantoprazole (PROTONIX) 40 MG tablet Take 1 tablet by mouth daily 90 tablet 3    TRUEPLUS PEN NEEDLES 31G X 6 MM MISC USE FOR INSULIN PENS 4 TIMES DAILY 100 each 1    Lancets MISC 1 each by Does not apply route 4 times daily 400 each 3    blood glucose monitor strips Test 4 times a day & as needed for symptoms of irregular blood glucose. 400 strip 3    Blood Glucose Monitoring Suppl (ACCU-CHEK WALTER PLUS) w/Device KIT USE TO TEST BLOOD SUGAR THREE TIMES DAILY Dx Code: E11.21 1 kit 0    blood glucose monitor kit and supplies Test 2 times a day & as needed for symptoms of irregular blood glucose. 1 kit 0    Insulin Syringe-Needle U-100 31G X 5/16\" 1 ML MISC 1 each by Does not apply route 2 times daily 200 each 3    aspirin 325 MG tablet Take 1 tablet by mouth daily.  30 tablet 0       Medications Prior to Admission: oxybutynin (DITROPAN-XL) 10 MG extended release tablet, Take 1 tablet by mouth daily  metFORMIN (GLUCOPHAGE) 1000 MG tablet, TAKE 1 TABLET BY MOUTH TWO  TIMES A DAY WITH MEALS  atorvastatin (LIPITOR) 40 MG tablet, TAKE 1 TABLET BY MOUTH ONCE A DAY  clopidogrel (PLAVIX) 75 MG tablet, TAKE 1 TABLET BY MOUTH  DAILY  FLUoxetine (PROZAC) 40 MG capsule, TAKE 1 CAPSULE BY MOUTH  ONCE A DAY  quinapril (ACCUPRIL) 40 MG tablet, TAKE 1 TABLET BY MOUTH ONCE A DAY  insulin lispro protamine & lispro (HUMALOG MIX 75/25 KWIKPEN) (75-25) 100 UNIT per ML SUPN injection pen, 50 units in AM, 16 units with lunch, 10 units before dinner  pantoprazole (PROTONIX) 40 MG tablet, Take 1 tablet by mouth daily  TRUEPLUS PEN NEEDLES 31G X 6 MM MISC, USE FOR INSULIN PENS 4 TIMES DAILY  Lancets MISC, 1 each by Does not apply route 4 times daily  blood glucose monitor strips, Test 4 times a day & as needed for symptoms of irregular blood glucose. Blood Glucose Monitoring Suppl (ACCU-CHEK WALTER PLUS) w/Device KIT, USE TO TEST BLOOD SUGAR THREE TIMES DAILY Dx Code: E11.21  blood glucose monitor kit and supplies, Test 2 times a day & as needed for symptoms of irregular blood glucose. Insulin Syringe-Needle U-100 31G X 5/16\" 1 ML MISC, 1 each by Does not apply route 2 times daily  aspirin 325 MG tablet, Take 1 tablet by mouth daily.   [DISCONTINUED] carvedilol (COREG) 25 MG tablet, TAKE 1 TABLET BY MOUTH TWO  TIMES DAILY WITH MEALS  [DISCONTINUED] amLODIPine (NORVASC) 5 MG tablet, Take 1 tablet by mouth daily     Scheduled Meds:   insulin lispro  0-18 Units Subcutaneous TID WC    insulin lispro  0-9 Units Subcutaneous Nightly    insulin glargine  25 Units Subcutaneous Daily    hydrALAZINE  25 mg Oral 3 times per day    lisinopril  10 mg Oral Daily    amLODIPine  10 mg Oral Daily    atorvastatin  40 mg Oral Nightly    clopidogrel  75 mg Oral Daily    FLUoxetine  40 mg Oral Daily    sodium chloride flush  10 mL Intravenous 2 times per day    enoxaparin 30 mg Subcutaneous Daily     Continuous Infusions:   dextrose       PRN Meds:.calcium carbonate, glucose, dextrose, glucagon (rDNA), dextrose, sodium chloride flush, polyethylene glycol, promethazine **OR** ondansetron, sodium chloride flush, potassium chloride **OR** potassium alternative oral replacement **OR** potassium chloride, magnesium sulfate, acetaminophen **OR** acetaminophen      Allergies reviewed by me: Patient has no known allergies. REVIEW OF SYSTEMS:  As mentioned in chief complaint and HPI , Subjective   All other 10-point review of systems: negative. PHYSICAL EXAM:  Recent vital signs and recent I/Os reviewed by me. Wt Readings from Last 3 Encounters:   09/06/20 177 lb 0.5 oz (80.3 kg)   07/09/20 186 lb (84.4 kg)   03/09/20 189 lb (85.7 kg)     BP Readings from Last 3 Encounters:   09/06/20 (!) 151/75   07/09/20 132/62   03/09/20 116/72     Patient Vitals for the past 24 hrs:   BP Temp Temp src Pulse Resp SpO2 Weight   09/06/20 0800 (!) 151/75 98.5 °F (36.9 °C) Oral 86 16 95 % --   09/06/20 0552 (!) 179/74 98.5 °F (36.9 °C) Oral 71 16 91 % 177 lb 0.5 oz (80.3 kg)   09/05/20 2120 (!) 164/52 98.9 °F (37.2 °C) Oral 68 16 94 % --   09/05/20 1951 (!) 155/69 98.9 °F (37.2 °C) Oral 75 16 94 % --   09/05/20 1647 (!) 160/65 -- -- -- -- -- --   09/05/20 1307 (!) 156/63 97.2 °F (36.2 °C) Oral 81 18 93 % --       Intake/Output Summary (Last 24 hours) at 9/6/2020 1223  Last data filed at 9/6/2020 1218  Gross per 24 hour   Intake 720 ml   Output --   Net 720 ml     Physical Exam  Vitals signs reviewed. Constitutional:       General: She is not in acute distress. Comments: Obese body habitus   HENT:      Head: Normocephalic and atraumatic. Right Ear: External ear normal.      Left Ear: External ear normal.      Mouth/Throat:      Mouth: Mucous membranes are not dry. Eyes:      General: No scleral icterus.      Conjunctiva/sclera: Conjunctivae normal.   Neck:      Musculoskeletal: Neck 09/02/2020    TROPONINI <0.01 08/22/2018    TROPONINI <0.01 07/12/2018       U/A:    Lab Results   Component Value Date    NITRITE NEGATIVE 10/19/2017    COLORU Yellow 09/02/2020    PROTEINU 30 09/02/2020    PHUR 5.5 09/02/2020    WBCUA 46 09/02/2020    RBCUA 3 09/02/2020    BACTERIA RARE 08/19/2018    CLARITYU Clear 09/02/2020    SPECGRAV 1.020 09/02/2020    LEUKOCYTESUR MODERATE 09/02/2020    UROBILINOGEN 0.2 09/02/2020    BILIRUBINUR Negative 09/02/2020    BILIRUBINUR NEGATIVE 10/19/2017    BLOODU Negative 09/02/2020    GLUCOSEU 100 09/02/2020     Microalbumen/Creatinine ratio:  No components found for: RUCREAT  24 Hour Urine for Protein:  No components found for: RAWUPRO, UHRS3, VNQH87OK, UTV3  24 Hour Urine for Creatinine Clearance:  No components found for: CREAT4, UHRS10, UTV10  Urine Toxicology:  No components found for: Vickie Byes, IBENZO, ICOCAINE, IMARTHC, IOPIATES, IPHENCYC    HgBA1c:    Lab Results   Component Value Date    LABA1C 8.6 09/03/2020     RPR:  No results found for: RPR  HIV:  No results found for: HIV  KAMINI:  No results found for: ANATITER, KAMINI  RF:  No results found for: RF  DSDNA:  No components found for: DNA  AMYLASE:    Lab Results   Component Value Date    AMYLASE 72 11/12/2012     LIPASE:    Lab Results   Component Value Date    LIPASE 25.52 11/12/2012     Fibrinogen Level:  No components found for: FIB       BELOW MENTIONED RADIOLOGY STUDY RESULTS BY ME:    Xr Chest Portable    Result Date: 9/2/2020  EXAMINATION: ONE XRAY VIEW OF THE CHEST 9/2/2020 1:22 pm COMPARISON: 08/22/2018 HISTORY: ORDERING SYSTEM PROVIDED HISTORY: SOB TECHNOLOGIST PROVIDED HISTORY: Reason for exam:->SOB FINDINGS: The cardial-pericardial silhouette is unremarkable in appearance. The lungs are clear. No pneumothorax is found. No free air is seen. No acute bony abnormality. Unremarkable portable chest radiograph.        Nahomi Macario MD

## 2020-09-08 ENCOUNTER — CARE COORDINATION (OUTPATIENT)
Dept: CASE MANAGEMENT | Age: 80
End: 2020-09-08

## 2020-09-08 NOTE — CARE COORDINATION
First attempt at 24 hour discharge call, no answer, CTN left VM with contact information and request for return call. CTN will continue with outreach call attempts.

## 2020-09-09 ENCOUNTER — CARE COORDINATION (OUTPATIENT)
Dept: CASE MANAGEMENT | Age: 80
End: 2020-09-09

## 2020-09-10 ENCOUNTER — CARE COORDINATION (OUTPATIENT)
Dept: CASE MANAGEMENT | Age: 80
End: 2020-09-10

## 2020-10-02 PROBLEM — N39.0 UTI (URINARY TRACT INFECTION): Status: RESOLVED | Noted: 2020-09-02 | Resolved: 2020-10-02

## 2020-10-26 RX ORDER — FLUOXETINE HYDROCHLORIDE 40 MG/1
CAPSULE ORAL
Qty: 90 CAPSULE | Refills: 0 | Status: SHIPPED | OUTPATIENT
Start: 2020-10-26 | End: 2020-12-10 | Stop reason: SDUPTHER

## 2020-10-26 RX ORDER — BLOOD SUGAR DIAGNOSTIC
STRIP MISCELLANEOUS
Qty: 100 EACH | Refills: 0 | Status: SHIPPED | OUTPATIENT
Start: 2020-10-26 | End: 2020-12-10

## 2020-10-26 RX ORDER — PANTOPRAZOLE SODIUM 40 MG/1
40 TABLET, DELAYED RELEASE ORAL DAILY
Qty: 90 TABLET | Refills: 0 | Status: SHIPPED | OUTPATIENT
Start: 2020-10-26 | End: 2020-12-10 | Stop reason: SDUPTHER

## 2020-12-10 ENCOUNTER — OFFICE VISIT (OUTPATIENT)
Dept: FAMILY MEDICINE CLINIC | Age: 80
End: 2020-12-10
Payer: MEDICARE

## 2020-12-10 VITALS
RESPIRATION RATE: 16 BRPM | DIASTOLIC BLOOD PRESSURE: 76 MMHG | TEMPERATURE: 97.7 F | BODY MASS INDEX: 32.43 KG/M2 | HEART RATE: 95 BPM | SYSTOLIC BLOOD PRESSURE: 138 MMHG | WEIGHT: 183 LBS | OXYGEN SATURATION: 96 % | HEIGHT: 63 IN

## 2020-12-10 LAB
BILIRUBIN, POC: NORMAL
BLOOD URINE, POC: NEGATIVE
CLARITY, POC: CLEAR
COLOR, POC: YELLOW
GLUCOSE URINE, POC: NEGATIVE
HBA1C MFR BLD: 9 %
KETONES, POC: 40
LEUKOCYTE EST, POC: NORMAL
NITRITE, POC: NEGATIVE
PH, POC: 5.5
PROTEIN, POC: 100
SPECIFIC GRAVITY, POC: 1.02
UROBILINOGEN, POC: 1

## 2020-12-10 PROCEDURE — 1090F PRES/ABSN URINE INCON ASSESS: CPT | Performed by: FAMILY MEDICINE

## 2020-12-10 PROCEDURE — 3052F HG A1C>EQUAL 8.0%<EQUAL 9.0%: CPT | Performed by: FAMILY MEDICINE

## 2020-12-10 PROCEDURE — 1123F ACP DISCUSS/DSCN MKR DOCD: CPT | Performed by: FAMILY MEDICINE

## 2020-12-10 PROCEDURE — G8399 PT W/DXA RESULTS DOCUMENT: HCPCS | Performed by: FAMILY MEDICINE

## 2020-12-10 PROCEDURE — 4040F PNEUMOC VAC/ADMIN/RCVD: CPT | Performed by: FAMILY MEDICINE

## 2020-12-10 PROCEDURE — G8417 CALC BMI ABV UP PARAM F/U: HCPCS | Performed by: FAMILY MEDICINE

## 2020-12-10 PROCEDURE — 90694 VACC AIIV4 NO PRSRV 0.5ML IM: CPT | Performed by: FAMILY MEDICINE

## 2020-12-10 PROCEDURE — G0008 ADMIN INFLUENZA VIRUS VAC: HCPCS | Performed by: FAMILY MEDICINE

## 2020-12-10 PROCEDURE — 83036 HEMOGLOBIN GLYCOSYLATED A1C: CPT | Performed by: FAMILY MEDICINE

## 2020-12-10 PROCEDURE — 81002 URINALYSIS NONAUTO W/O SCOPE: CPT | Performed by: FAMILY MEDICINE

## 2020-12-10 PROCEDURE — 1036F TOBACCO NON-USER: CPT | Performed by: FAMILY MEDICINE

## 2020-12-10 PROCEDURE — G8427 DOCREV CUR MEDS BY ELIG CLIN: HCPCS | Performed by: FAMILY MEDICINE

## 2020-12-10 PROCEDURE — G8484 FLU IMMUNIZE NO ADMIN: HCPCS | Performed by: FAMILY MEDICINE

## 2020-12-10 PROCEDURE — 99214 OFFICE O/P EST MOD 30 MIN: CPT | Performed by: FAMILY MEDICINE

## 2020-12-10 RX ORDER — QUINAPRIL 40 MG/1
TABLET ORAL
Qty: 90 TABLET | Refills: 1 | Status: SHIPPED | OUTPATIENT
Start: 2020-12-10 | End: 2021-05-26

## 2020-12-10 RX ORDER — CLOPIDOGREL BISULFATE 75 MG/1
TABLET ORAL
Qty: 90 TABLET | Refills: 1 | Status: SHIPPED | OUTPATIENT
Start: 2020-12-10 | End: 2021-05-26

## 2020-12-10 RX ORDER — AMLODIPINE BESYLATE 10 MG/1
10 TABLET ORAL DAILY
Qty: 90 TABLET | Refills: 3 | Status: SHIPPED | OUTPATIENT
Start: 2020-12-10 | End: 2021-09-07

## 2020-12-10 RX ORDER — PEN NEEDLE, DIABETIC 31 G X1/4"
NEEDLE, DISPOSABLE MISCELLANEOUS
Qty: 100 EACH | Refills: 1 | Status: CANCELLED | OUTPATIENT
Start: 2020-12-10

## 2020-12-10 RX ORDER — INSULIN LISPRO 100 [IU]/ML
INJECTION, SUSPENSION SUBCUTANEOUS
Qty: 25 PEN | Refills: 3 | Status: SHIPPED | OUTPATIENT
Start: 2020-12-10 | End: 2021-12-16 | Stop reason: SDUPTHER

## 2020-12-10 RX ORDER — OXYBUTYNIN CHLORIDE 10 MG/1
10 TABLET, EXTENDED RELEASE ORAL DAILY
Qty: 90 TABLET | Refills: 3 | Status: SHIPPED | OUTPATIENT
Start: 2020-12-10 | End: 2021-10-11

## 2020-12-10 RX ORDER — LANCETS 30 GAUGE
1 EACH MISCELLANEOUS 4 TIMES DAILY
Qty: 400 EACH | Refills: 3 | Status: SHIPPED | OUTPATIENT
Start: 2020-12-10

## 2020-12-10 RX ORDER — BLOOD SUGAR DIAGNOSTIC
1 STRIP MISCELLANEOUS 2 TIMES DAILY
Qty: 200 EACH | Refills: 3 | Status: CANCELLED | OUTPATIENT
Start: 2020-12-10

## 2020-12-10 RX ORDER — FLUOXETINE HYDROCHLORIDE 40 MG/1
CAPSULE ORAL
Qty: 90 CAPSULE | Refills: 0 | Status: SHIPPED | OUTPATIENT
Start: 2020-12-10 | End: 2021-02-09

## 2020-12-10 RX ORDER — BLOOD-GLUCOSE METER
EACH MISCELLANEOUS
Qty: 1 KIT | Refills: 0 | Status: CANCELLED | OUTPATIENT
Start: 2020-12-10

## 2020-12-10 RX ORDER — ASPIRIN 325 MG
325 TABLET ORAL DAILY
Qty: 30 TABLET | Refills: 0 | Status: CANCELLED | OUTPATIENT
Start: 2020-12-10

## 2020-12-10 RX ORDER — BLOOD SUGAR DIAGNOSTIC
STRIP MISCELLANEOUS
Qty: 100 EACH | Refills: 0 | Status: CANCELLED | OUTPATIENT
Start: 2020-12-10

## 2020-12-10 RX ORDER — FLASH GLUCOSE SCANNING READER
EACH MISCELLANEOUS
Qty: 1 DEVICE | Refills: 0 | Status: SHIPPED | OUTPATIENT
Start: 2020-12-10 | End: 2022-05-12

## 2020-12-10 RX ORDER — GLUCOSAMINE HCL/CHONDROITIN SU 500-400 MG
CAPSULE ORAL
Qty: 400 STRIP | Refills: 3 | Status: SHIPPED | OUTPATIENT
Start: 2020-12-10 | End: 2021-06-21 | Stop reason: SDUPTHER

## 2020-12-10 RX ORDER — ATORVASTATIN CALCIUM 40 MG/1
TABLET, FILM COATED ORAL
Qty: 90 TABLET | Refills: 1 | Status: SHIPPED | OUTPATIENT
Start: 2020-12-10 | End: 2021-03-19

## 2020-12-10 RX ORDER — PEN NEEDLE, DIABETIC 31 G X1/4"
1 NEEDLE, DISPOSABLE MISCELLANEOUS 3 TIMES DAILY
Qty: 400 EACH | Refills: 3 | Status: SHIPPED | OUTPATIENT
Start: 2020-12-10 | End: 2021-06-21 | Stop reason: SDUPTHER

## 2020-12-10 RX ORDER — FLASH GLUCOSE SENSOR
KIT MISCELLANEOUS
Qty: 2 EACH | Refills: 5 | Status: SHIPPED | OUTPATIENT
Start: 2020-12-10 | End: 2022-05-12

## 2020-12-10 RX ORDER — PANTOPRAZOLE SODIUM 40 MG/1
40 TABLET, DELAYED RELEASE ORAL DAILY
Qty: 90 TABLET | Refills: 3 | Status: SHIPPED | OUTPATIENT
Start: 2020-12-10 | End: 2022-03-09

## 2020-12-10 NOTE — PROGRESS NOTES
DIABETES MELLITUS FOLOW-UP  Subjective:      Chief Complaint   Patient presents with    Diabetes     sugar this am was 150.  up 4 or 5 times night to urinate. bm are inconsistent.  eyes are itching     Jen Mcnair is an [de-identified] y.o. female who presents for follow up of following chronic problems:  1. Type 2 diabetes mellitus with diabetic nephropathy, with long-term current use of insulin ([facility] [address]    2. Mixed hyperlipidemia    3. Stenosis of right carotid artery GERRTUDE < 50% (6/2017), repeat 1 year    4. Cerebral vascular disease    5. Major depressive disorder with single episode, in full remission ([facility] [address]    6. Nocturia    7. Gastroesophageal reflux disease without esophagitis    8. Essential hypertension    9. Needs flu shot    10. Uncontrolled type 2 diabetes mellitus with diabetic nephropathy ([facility] [address]    11. Anemia, unspecified type    12. Urinary frequency      Complaints: itchy below eyes x months. Thinks had COVID a few weeks ago. Not tested but DOP had at same time and was hospitalized for COVID. Feels fine now. Only 2 low sugars in past quarter, overnight. Episodically, has fecal urge incontinence of watery stool. No blood. Lasts a few days and then gone for weeks. Notes nocturia every 2 h at night. No pain. · Patient checks sugars 3  time(s) daily. Average: 250. · Patent follows diabetic diet? Sometimes  · Exercise: intermittently  · Taking medicines daily as directed? Yes  · Is the patient reporting any side effects of medications? No  · Patient checks bottom of feet daily? Yes  · Tobacco history updated:  reports that she quit smoking about 18 years ago. She has a 7.50 pack-year smoking history. She has never used smokeless tobacco.    Review of Systems   General ROS: fever? No,   Night sweats? No  Ophthalmic ROS: change in vision? No  Endocrine ROS: fatigue? No  Unexpected weight changes? No  Respiratory ROS: Shortness of breath? No  Cardiovascular ROS: chest pain? No  Gastrointestinal ROS: abdominal pain? No  Change in stools? No  Genito-Urinary ROS: painful urination? No  Trouble urinating? No  Neurological ROS: TIA or stroke symptoms? No  Numbness/tingling? No  Dermatological ROS: rash or sores on feet? No  Changes in skin spots? No     LAST LABS  Lab Results   Component Value Date    LABA1C 8.6 2020    LABA1C 8.2 2020    LABA1C 9.1 2020     LDL Calculated   Date Value Ref Range Status   12/10/2019 59 <100 mg/dL Final     Lab Results   Component Value Date    HDL 59 12/10/2019     Lab Results   Component Value Date    TRIG 87 12/10/2019     Lab Results   Component Value Date    GLUCOSE 221 (H) 2020     Lab Results   Component Value Date     2020    K 3.5 2020    CREATININE 1.2 2020     Lab Results   Component Value Date    WBC 6.3 2020    HGB 9.7 (L) 2020     2020     Lab Results   Component Value Date    ALT <5 (L) 2020    AST 11 (L) 2020    ALKPHOS 50 2020    BILITOT 0.4 2020     TSH (uIU/mL)   Date Value   2020 2.02     HISTORY:  Patient's medications, allergies, past medical, and social histories were reviewed and updated as appropriate. CHART REVIEW  Health Maintenance Due   Topic Date Due    Shingles Vaccine (2 of 3) 2017    DTaP/Tdap/Td vaccine (2 - Td) 07/15/2020    Flu vaccine (1) 2020    Lipid screen  12/10/2020     Social History     Tobacco Use    Smoking status: Former Smoker     Packs/day: 0.50     Years: 15.00     Pack years: 7.50     Last attempt to quit: 2002     Years since quittin.9    Smokeless tobacco: Never Used    Tobacco comment: congrats   Substance Use Topics    Alcohol use: No    Drug use: No      The ASCVD Risk score (Jenna Guy, et al., 2013) failed to calculate for the following reasons:     The 2013 ASCVD risk score is only valid for ages 36 to 78  Prior to Visit Medications    Medication Sig Taking? Authorizing Provider   amLODIPine (NORVASC) 10 MG tablet Take 1 tablet by mouth daily Yes Eli Mondragon MD   atorvastatin (LIPITOR) 40 MG tablet TAKE 1 TABLET BY MOUTH ONCE A DAY Yes Eli Mondragon MD   blood glucose monitor kit and supplies Test 2 times a day & as needed for symptoms of irregular blood glucose. Yes Eli Mondragon MD   clopidogrel (PLAVIX) 75 MG tablet TAKE 1 TABLET BY MOUTH  DAILY Yes Eli Mondragon MD   FLUoxetine (PROZAC) 40 MG capsule TAKE 1 CAPSULE BY MOUTH  ONCE DAILY Yes Eli Mondragon MD   Lancets MISC 1 each by Does not apply route 4 times daily Yes Eli Mondragon MD   metFORMIN (GLUCOPHAGE) 1000 MG tablet TAKE 1 TABLET BY MOUTH TWO  TIMES A DAY WITH MEALS Yes Eli Mondragon MD   oxybutynin (DITROPAN-XL) 10 MG extended release tablet Take 1 tablet by mouth daily Yes Eli Mondragon MD   pantoprazole (PROTONIX) 40 MG tablet Take 1 tablet by mouth daily Yes Eli Mondragon MD   quinapril (ACCUPRIL) 40 MG tablet TAKE 1 TABLET BY MOUTH ONCE A DAY Yes Eli Mondragon MD   insulin lispro protamine & lispro (HUMALOG MIX 75/25 KWIKPEN) (75-25) 100 UNIT per ML SUPN injection pen 55 units in AM, 18 units with lunch, 12 units before dinner Yes Eli Mondragon MD   blood glucose monitor strips Test 4 times a day & as needed for symptoms of irregular blood glucose.  Yes Eli Mondragon MD   Continuous Blood Gluc Sensor ([address] [place] [address]) MISC as needed Yes Eli Mondragon MD   Continuous Blood Gluc  (FREESTYLE AVELINA 14 DAY READER) GREGORIO as needed Yes Eli Mondragon MD   Insulin Pen Needle (TRUEPLUS PEN NEEDLES) 31G X 6 MM MISC Apply 1 each topically 3 times daily Yes Eli Mondragon MD   Blood Glucose Monitoring Suppl (ACCU-CHEK WALTER PLUS) w/Device KIT USE TO TEST BLOOD SUGAR THREE TIMES DAILY Dx Code: E11.21 Yes Eli Mondragon MD   Insulin Syringe-Needle U-100 31G X 5/16\" 1 ML MISC 1 each by Does not apply route 2 times daily Yes Eli Mondragon MD   aspirin 325 MG tablet Take 1 tablet by mouth daily. Yes Hannah Police, APRN - CNP      Family History   Problem Relation Age of Onset    Stroke Father     Mental Illness Brother         schizophrenia    Cancer Brother         Lung Cancer     Objective:   PHYSICAL EXAM   /76 (Site: Right Upper Arm, Position: Sitting, Cuff Size: Large Adult)   Pulse 95   Temp 97.7 °F (36.5 °C) (Temporal)   Resp 16   Ht 5' 3\" (1.6 m)   Wt 183 lb (83 kg)   LMP  (LMP Unknown)   SpO2 96%   BMI 32.42 kg/m²   BP Readings from Last 5 Encounters:   12/10/20 138/76   09/06/20 (!) 151/75   07/09/20 132/62   03/09/20 116/72   01/23/20 130/80     Wt Readings from Last 5 Encounters:   12/10/20 183 lb (83 kg)   09/06/20 177 lb 0.5 oz (80.3 kg)   07/09/20 186 lb (84.4 kg)   03/09/20 189 lb (85.7 kg)   01/23/20 189 lb (85.7 kg)      GENERAL:   · well-developed, well-nourished, alert, no distress. EYES:   · External findings: lids and lashes normal and conjunctivae and sclerae normal  LUNGS:    · Breathing unlabored  · clear to auscultation bilaterally and good air movement  CARDIOVASC:   · regular rate and rhythm  · LEGS:  Lower extremity edema: none    SKIN: warm and dry  PSYCH:    · Alert and oriented  · Normal reasoning, insight good  · Facial expressions full, mood appropriate  · No memory disturbance noted  MUSCULOSKEL:    · No significant finger or nail findings  NEURO:   · CN 2-12 intact     Assessment and Plan:      Diagnosis Orders   1. Type 2 diabetes mellitus with diabetic nephropathy, with long-term current use of insulin ([facility]) UNCONTROLLED blood glucose monitor kit and supplies    Lancets MISC    metFORMIN (GLUCOPHAGE) 1000 MG tablet    insulin lispro protamine & lispro (HUMALOG MIX 75/25 KWIKPEN) (75-25) 100 UNIT per ML SUPN injection pen    Lipid Panel    Insulin Pen Needle (TRUEPLUS PEN NEEDLES) 31G X 6 MM MISC   2. Mixed hyperlipidemia Stable with current medications. amLODIPine (NORVASC) 10 MG tablet    atorvastatin (LIPITOR) 40 MG tablet   3. improving. · See what monitor best covered at [facility]. · For eyes, limit soap use. Use ltion. For itching/rash, may use OTC 1% hydrocortisone cream as needed.

## 2020-12-10 NOTE — PROGRESS NOTES
Vaccine Information Sheet, \"Influenza - Inactivated\"  given to Gabriela Samuels, or parent/legal guardian of  Gabriela Samuels and verbalized understanding. Patient responses:    Have you ever had a reaction to a flu vaccine? NO  Are you able to eat eggs without adverse effects? YES   Do you have any current illness? NO  Have you ever had [place] Syndrome? NO  Flu vaccine given per order. Please see immunization tab.

## 2020-12-10 NOTE — PATIENT INSTRUCTIONS
INSTRUCTIONS  NEXT APPOINTMENT: Please schedule check-up in 3 months. · PLEASE TAKE THIS FORM TO CHECK-OUT WINDOW TO SCHEDULE NEXT VISIT. PLEASE GET FASTING BLOODWORK DRAWN SOON. Lab is on first floor in [address]. Hours Monday to Friday 7 AM to 5 PM.   · INCREASE insulin to 55 + 18 + 12. · Call with 2 hour after meal reading in 2 weeks. · OTC Culturelle/acidophilus/probiotic for a month to see if helps bowels. · OK to take immodium once a day ONLY when loose stools flare up. · Will refer to GI if not improving. · See what monitor best covered at [facility]. · For eyes, limit soap use. Use ltion. For itching/rash, may use OTC 1% hydrocortisone cream as needed. Patient Education       TOP 10 TIPS FOR RELIEVING DRY SKIN    1. Bathe daily. A daily bath or shower can add much-needed moisture to the skin. To hydrate the skin with a daily bath or shower, follow these guidelines:   Keep it short. A 5- to 10-minute bath or shower adds moisture. Spend more time in the water and the skin begins to dry. Use warm, not hot, water. Hot water removes natural oils from the skin more quickly than warm water. The more natural oils removed, the drier the skin becomes. Close the bathroom door. This keeps the much-needed humidity in the room. Use a mild cleanser. Deodorant bars, fragrance in soaps, and products containing alcohol strip natural oils from the skin, which dries the skin. Look for a mild, fragrance-free cleanser that moisturizes. Gently pat the skin dry. Gently blotting the skin helps retain moisture and is less irritating to dry, sensitive skin. Apply moisturizer within 3 minutes of getting out of the bath or shower. Contrary to popular belief, moisturizer does not add moisture to the skin. Moisturizer traps existing water in the skin, preventing the water from evaporating. To trap water from a bath or shower in the skin, moisturizer must be applied within 3 minutes of bathing.  Applied regularly, this helps decrease dryness and itching. 2. Moisturize, moisturize, moisture. Dry skin needs moisture. Applying moisturizer within 3 minutes of bathing seals in much-needed water. If skin is noticeably dry and uncomfortable, moisturizing more frequently throughout the day can help the skin heal. Consistent use of moisturizer will help prevent dry skin from returning. 3. Select moisturizers best suited to relieve dry skin. Ointments and creams tend to be more effective than lotions. Creams and ointments also usually less irritating to dry, sensitive skin. Moisturizer does not need to be expensive to be effective. Look at the ingredients not the price. When selecting a moisturizer to soothe dry skin, look for products that contain lactic acid or urea. These help alleviate even severe dryness. Hyaluronic acid, which naturally occurs in the skin and diminishes with age, can help the skin hold water. Dimethicone and glycerin help draw water to the skin and keep it there. Lanolin, mineral oil, and petrolatum (also known as petroleum jelly) effectively trap water in the skin. 4. Check the ingredients. Retinoids or an alpha-hydroxy acid, can irritate dry, itchy skin. Switching to a more moisturizing formula can bring relief. 5. Read the ingredients on other skin care products. Using mild, unscented skin care products can help reduce irritation and make skin feel more comfortable. Deodorant soaps, alcohol-based toners, and products that contain fragrance can irritate dry, sensitive skin. Some people find using mild, unscented products year round helps their skin feel better. 6. Plug in a humidifier. A humidifier can add much-needed moisture to the air. 7. Avoid wearing wool and other rough materials next to the skin. These can irritate dry skin, making the condition worse. 8. Give hands the extra attention they deserve.  Hands often show the effects of a dry environment more than any other part of the body. These tips may help hands heal and prevent future problems:   When outdoors in winter, wear gloves. This helps protect the skin from the cold, dry air, which can zap moisture from the skin. Apply hand cream after each hand washing. It is important not to skimp on hand washing, which can remove harmful bacteria and viruses. Applying hand cream after each hand washing helps hands retain much-needed moisture. This is especially important for people who frequently immerse their hands in water throughout the day. If more relief is needed, dab petroleum jelly on the hands before bed. If hands are frequently immersed in water, wearing waterproof gloves can help protect the hands. Again, it is important not to skimp on hand washing. 9. Wear sunscreen every day. This is probably one of the most important and difficult tips to remember. Even in the winter, exposure to the suns rays speeds up the aging process in the skin. This exposure increases the risk of developing dry skin, wrinkles, age spots, and spider veins. Daily use of a sunscreen can slow these sun-induced signs of aging and reduce the risk of developing skin cancer. Dermatologists recommend wearing a broad-spectrum sunscreen with a Sun Protection Factor (SPF) of 30 or higher on all skin that will be exposed. 10. Apply lip balm often. Carrying a tube of lip balm and applying it frequently throughout the day helps heal dry, cracked lips. If lips are extremely dry, dab on petroleum jelly before bed. Continuing to use lip balm once the lips heal can keep lips soft and supple. Dermatologists recommend using a lip balm that has an SPF of at least 30. This can help reduce signs of premature aging and prevent skin cancer on the lips.

## 2020-12-11 LAB — URINE CULTURE, ROUTINE: NORMAL

## 2020-12-15 RX ORDER — CARVEDILOL 25 MG/1
TABLET ORAL
Qty: 180 TABLET | Refills: 3 | OUTPATIENT
Start: 2020-12-15

## 2020-12-16 ENCOUNTER — HOSPITAL ENCOUNTER (OUTPATIENT)
Age: 80
Discharge: HOME | End: 2020-12-16
Payer: MEDICARE

## 2020-12-16 LAB
BASOPHILS ABSOLUTE: 0.1 K/UL (ref 0–0.2)
BASOPHILS RELATIVE PERCENT: 1.1 %
CHOLESTEROL, TOTAL: 137 MG/DL (ref 0–199)
EOSINOPHILS ABSOLUTE: 0.2 K/UL (ref 0–0.6)
EOSINOPHILS RELATIVE PERCENT: 3.5 %
HCT VFR BLD CALC: 32.2 % (ref 36–48)
HDLC SERPL-MCNC: 55 MG/DL (ref 40–60)
HEMOGLOBIN: 10.1 G/DL (ref 12–16)
LDL CHOLESTEROL CALCULATED: 65 MG/DL
LYMPHOCYTES ABSOLUTE: 1.5 K/UL (ref 1–5.1)
LYMPHOCYTES RELATIVE PERCENT: 30.6 %
MCH RBC QN AUTO: 26.4 PG (ref 26–34)
MCHC RBC AUTO-ENTMCNC: 31.4 G/DL (ref 31–36)
MCV RBC AUTO: 84.1 FL (ref 80–100)
MONOCYTES ABSOLUTE: 0.5 K/UL (ref 0–1.3)
MONOCYTES RELATIVE PERCENT: 10.1 %
NEUTROPHILS ABSOLUTE: 2.8 K/UL (ref 1.7–7.7)
NEUTROPHILS RELATIVE PERCENT: 54.7 %
PDW BLD-RTO: 17.9 % (ref 12.4–15.4)
PLATELET # BLD: 314 K/UL (ref 135–450)
PMV BLD AUTO: 9.6 FL (ref 5–10.5)
RBC # BLD: 3.83 M/UL (ref 4–5.2)
TRIGL SERPL-MCNC: 85 MG/DL (ref 0–150)
VLDLC SERPL CALC-MCNC: 17 MG/DL
WBC # BLD: 5.1 K/UL (ref 4–11)

## 2020-12-16 PROCEDURE — 36415 COLL VENOUS BLD VENIPUNCTURE: CPT

## 2020-12-16 PROCEDURE — 85025 COMPLETE CBC W/AUTO DIFF WBC: CPT

## 2020-12-16 PROCEDURE — 80061 LIPID PANEL: CPT

## 2020-12-22 ENCOUNTER — TELEPHONE (OUTPATIENT)
Dept: FAMILY MEDICINE CLINIC | Age: 80
End: 2020-12-22

## 2020-12-22 NOTE — TELEPHONE ENCOUNTER
Pt called back states [facility] sent her medication and they were not suppose to because she gets it for free from [facility]   She is wondering if she should send it back because she can't pay for it   Is also making sure she wont get it sent to her again because she cant pay for meds   Please advise

## 2020-12-22 NOTE — TELEPHONE ENCOUNTER
Called [facility] they sent the pt a return label that will have instructions on it where to take the medication. Once she gets the address label the money will be refunded into her account. She needs to send it back asap. And they deleted the medication out of their system so that doesn't happen again. Called pt and left a message to call us back so I could let her know.

## 2020-12-24 DIAGNOSIS — D64.9 ANEMIA, UNSPECIFIED TYPE: ICD-10-CM

## 2020-12-24 LAB
FERRITIN: 14.4 NG/ML (ref 15–150)
FOLATE: 12.39 NG/ML (ref 4.78–24.2)
IRON SATURATION: 7 % (ref 15–50)
IRON: 28 UG/DL (ref 37–145)
TOTAL IRON BINDING CAPACITY: 395 UG/DL (ref 260–445)
VITAMIN B-12: 209 PG/ML (ref 211–911)

## 2020-12-31 RX ORDER — CARVEDILOL 25 MG/1
TABLET ORAL
Qty: 180 TABLET | Refills: 3 | OUTPATIENT
Start: 2020-12-31

## 2021-01-05 DIAGNOSIS — E53.8 B12 DEFICIENCY: Primary | ICD-10-CM

## 2021-01-05 DIAGNOSIS — D50.9 IRON DEFICIENCY ANEMIA, UNSPECIFIED IRON DEFICIENCY ANEMIA TYPE: ICD-10-CM

## 2021-01-05 RX ORDER — FERROUS SULFATE 325(65) MG
325 TABLET ORAL 2 TIMES DAILY
Qty: 200 TABLET | Refills: 3 | Status: SHIPPED | OUTPATIENT
Start: 2021-01-05 | End: 2021-03-25 | Stop reason: SDUPTHER

## 2021-01-05 RX ORDER — LANOLIN ALCOHOL/MO/W.PET/CERES
1000 CREAM (GRAM) TOPICAL DAILY
Qty: 30 TABLET | Refills: 3 | Status: SHIPPED | OUTPATIENT
Start: 2021-01-05 | End: 2021-06-22 | Stop reason: SDUPTHER

## 2021-01-08 ENCOUNTER — TELEPHONE (OUTPATIENT)
Dept: FAMILY MEDICINE CLINIC | Age: 81
End: 2021-01-08

## 2021-01-08 NOTE — TELEPHONE ENCOUNTER
Pt returning Alicja's call   States it may be about insulin that she needs to send back   Please advise

## 2021-02-02 ENCOUNTER — TELEPHONE (OUTPATIENT)
Dept: FAMILY MEDICINE CLINIC | Age: 81
End: 2021-02-02

## 2021-02-02 RX ORDER — GLUCOSAMINE HCL/CHONDROITIN SU 500-400 MG
CAPSULE ORAL
Qty: 400 STRIP | Refills: 3 | Status: SHIPPED | OUTPATIENT
Start: 2021-02-02 | End: 2022-03-21

## 2021-03-19 ENCOUNTER — TELEMEDICINE (OUTPATIENT)
Dept: FAMILY MEDICINE CLINIC | Age: 81
End: 2021-03-19
Payer: MEDICARE

## 2021-03-19 DIAGNOSIS — E78.2 MIXED HYPERLIPIDEMIA: ICD-10-CM

## 2021-03-19 DIAGNOSIS — I10 ESSENTIAL HYPERTENSION: ICD-10-CM

## 2021-03-19 DIAGNOSIS — F32.5 MAJOR DEPRESSIVE DISORDER WITH SINGLE EPISODE, IN FULL REMISSION (HCC): ICD-10-CM

## 2021-03-19 DIAGNOSIS — E11.42 DIABETIC POLYNEUROPATHY ASSOCIATED WITH TYPE 2 DIABETES MELLITUS (HCC): ICD-10-CM

## 2021-03-19 DIAGNOSIS — R41.3 MEMORY PROBLEM: ICD-10-CM

## 2021-03-19 DIAGNOSIS — D50.9 IRON DEFICIENCY ANEMIA, UNSPECIFIED IRON DEFICIENCY ANEMIA TYPE: ICD-10-CM

## 2021-03-19 DIAGNOSIS — I67.9 CEREBRAL VASCULAR DISEASE: ICD-10-CM

## 2021-03-19 DIAGNOSIS — E11.65 UNCONTROLLED TYPE 2 DIABETES MELLITUS WITH HYPERGLYCEMIA (HCC): Primary | ICD-10-CM

## 2021-03-19 PROCEDURE — 1123F ACP DISCUSS/DSCN MKR DOCD: CPT | Performed by: FAMILY MEDICINE

## 2021-03-19 PROCEDURE — 99214 OFFICE O/P EST MOD 30 MIN: CPT | Performed by: FAMILY MEDICINE

## 2021-03-19 PROCEDURE — 1090F PRES/ABSN URINE INCON ASSESS: CPT | Performed by: FAMILY MEDICINE

## 2021-03-19 PROCEDURE — G8399 PT W/DXA RESULTS DOCUMENT: HCPCS | Performed by: FAMILY MEDICINE

## 2021-03-19 PROCEDURE — G8427 DOCREV CUR MEDS BY ELIG CLIN: HCPCS | Performed by: FAMILY MEDICINE

## 2021-03-19 PROCEDURE — 4040F PNEUMOC VAC/ADMIN/RCVD: CPT | Performed by: FAMILY MEDICINE

## 2021-03-19 NOTE — PROGRESS NOTES
TELEHEALTH EVALUATION -- Audio/Visual (During [id number]-76 public health emergency)  VIDEO VISIT- patient and provider not at same location  Also present:daughter(s). DIABETES MELLITUS FOLOW-UP  Subjective:      Chief Complaint   Patient presents with    Diabetes     Jorge Weaver is an 80 y.o. female who presents for follow up of following chronic problems:  1. Uncontrolled type 2 diabetes mellitus with hyperglycemia ([address]    2. Diabetic polyneuropathy associated with type 2 diabetes mellitus ([address]    3. Essential hypertension    4. Mixed hyperlipidemia    5. Cerebral vascular disease    6. Major depressive disorder with single episode, in full remission ([address]    7. Iron deficiency anemia, unspecified iron deficiency anemia type    8. Memory problem      Complaints: pt is having issues remember if she took her medication or not especially her insulin. DOP calling to make sure three times per day   No longer c/o being cold    · Patient checks sugars 3  time(s) daily. Average:147   · Any low sugars? Yes 20% of the time   · Patent follows diabetic diet? No  · Exercise: walking rarely  · Taking medicines daily as directed? Sometimes  · Is the patient reporting any side effects of medications? No  · Patient checks bottom of feet daily? Sometimes  · Tobacco history updated:  reports that she quit smoking about 19 years ago. She has a 7.50 pack-year smoking history. She has never used smokeless tobacco.    Review of Systems   General ROS: fever? No,   Night sweats? No  Ophthalmic ROS: change in vision? No  Endocrine ROS: fatigue? No  Unexpected weight changes? No  Respiratory ROS: Shortness of breath? No  Cardiovascular ROS: chest pain? No  Gastrointestinal ROS: abdominal pain? No  Change in stools? No  Genito-Urinary ROS: painful urination? No  Trouble urinating? No  Neurological ROS: TIA or stroke symptoms? No  Numbness/tingling? No  Dermatological ROS: rash or sores on feet?  No Changes in skin spots? No    Health Maintenance Due   Topic Date Due    COVID-19 Vaccine (1) Never done    Shingles Vaccine (2 of 3) 02/06/2017    DTaP/Tdap/Td vaccine (2 - Td) 07/15/2020     *Chief complaint, HPI, History and ROS provided by the medical assistant has been reviewed and verified by provider- Daphne Miller MD    CHART REVIEW  Health Maintenance   Topic Date Due    COVID-19 Vaccine (1) Never done    Shingles Vaccine (2 of 3) 02/06/2017    DTaP/Tdap/Td vaccine (2 - Td) 07/15/2020    Annual Wellness Visit (AWV)  06/09/2021    Potassium monitoring  09/06/2021    Creatinine monitoring  09/06/2021    Lipid screen  12/16/2021    DEXA (modify frequency per FRAX score)  Completed    Flu vaccine  Completed    Pneumococcal 65+ years Vaccine  Completed    Hepatitis A vaccine  Aged Out    Hib vaccine  Aged Out    Meningococcal (ACWY) vaccine  Aged Out     The ASCVD Risk score (Lis Jolly, et al., 2013) failed to calculate for the following reasons: The 2013 ASCVD risk score is only valid for ages 36 to 78  Prior to Visit Medications    Medication Sig Taking? Authorizing Provider   FLUoxetine (PROZAC) 40 MG capsule TAKE 1 CAPSULE BY MOUTH  ONCE DAILY Yes Daphne Miller MD   blood glucose monitor strips Test 4 times a day & as needed for symptoms of irregular blood glucose. Dispense sufficient amount for indicated testing frequency plus additional to accommodate PRN testing needs. accu check test strips.  Yes Fortino Quintero MD   vitamin B-12 (CYANOCOBALAMIN) 1000 MCG tablet Take 1 tablet by mouth daily Yes Daphne Miller MD   ferrous sulfate (IRON 325) 325 (65 Fe) MG tablet Take 1 tablet by mouth 2 times daily Yes Daphne Miller MD   amLODIPine (NORVASC) 10 MG tablet Take 1 tablet by mouth daily Yes Daphne Miller MD   atorvastatin (LIPITOR) 40 MG tablet TAKE 1 TABLET BY MOUTH ONCE A DAY Yes Daphne Miller MD   blood glucose monitor kit and supplies Test 2 times a day & as needed for symptoms of irregular blood glucose. Yes Zelda Ward MD   clopidogrel (PLAVIX) 75 MG tablet TAKE 1 TABLET BY MOUTH  DAILY Yes Zelda Ward MD   Lancets MISC 1 each by Does not apply route 4 times daily Yes Zelda Ward MD   metFORMIN (GLUCOPHAGE) 1000 MG tablet TAKE 1 TABLET BY MOUTH TWO  TIMES A DAY WITH MEALS Yes Zelda Ward MD   oxybutynin (DITROPAN-XL) 10 MG extended release tablet Take 1 tablet by mouth daily Yes Zelda Ward MD   pantoprazole (PROTONIX) 40 MG tablet Take 1 tablet by mouth daily Yes Zelda Ward MD   quinapril (ACCUPRIL) 40 MG tablet TAKE 1 TABLET BY MOUTH ONCE A DAY Yes Zelda Ward MD   insulin lispro protamine & lispro (HUMALOG MIX 75/25 KWIKPEN) (75-25) 100 UNIT per ML SUPN injection pen 55 units in AM, 18 units with lunch, 12 units before dinner Yes Zelda Ward MD   blood glucose monitor strips Test 4 times a day & as needed for symptoms of irregular blood glucose. Yes Zelda Ward MD   Continuous Blood Gluc Sensor ([address] [place] [address]) MISC as needed Yes Zelda Ward MD   Continuous Blood Gluc  (FREESTYLE AVELNIA 14 DAY READER) GREGORIO as needed Yes Zelda Ward MD   Insulin Pen Needle (TRUEPLUS PEN NEEDLES) 31G X 6 MM MISC Apply 1 each topically 3 times daily Yes Zelda Ward MD   Blood Glucose Monitoring Suppl (ACCU-CHEK WALTER PLUS) w/Device KIT USE TO TEST BLOOD SUGAR THREE TIMES DAILY Dx Code: E11.21 Yes Zelda Ward MD   Insulin Syringe-Needle U-100 31G X 5/16\" 1 ML MISC 1 each by Does not apply route 2 times daily Yes Zelda Ward MD   aspirin 325 MG tablet Take 1 tablet by mouth daily.  Yes Paulino Nail, APRN - CNP      Family History   Problem Relation Age of Onset    Stroke Father     Mental Illness Brother         schizophrenia    Cancer Brother         Lung Cancer     Social History     Tobacco Use    Smoking status: Former Smoker     Packs/day: 0.50     Years: 15.00     Pack years: 7.50     Quit date: 1/1/2002     Years since quitting: 19.2    Smokeless tobacco: Never Used    Tobacco comment: congrats   Substance Use Topics    Alcohol use: No    Drug use: No      LAST LABS  Cholesterol, Total   Date Value Ref Range Status   12/16/2020 137 0 - 199 mg/dL Final     LDL Calculated   Date Value Ref Range Status   12/16/2020 65 <100 mg/dL Final     HDL   Date Value Ref Range Status   12/16/2020 55 40 - 60 mg/dL Final   11/14/2011 65 (H) 40 - 60 mg/dl Final     Triglycerides   Date Value Ref Range Status   12/16/2020 85 0 - 150 mg/dL Final     Lab Results   Component Value Date    GLUCOSE 221 (H) 09/06/2020     Lab Results   Component Value Date     09/06/2020    K 3.5 09/06/2020    CREATININE 1.2 09/06/2020     Lab Results   Component Value Date    WBC 5.1 12/16/2020    HGB 10.1 (L) 12/16/2020    HCT 32.2 (L) 12/16/2020    MCV 84.1 12/16/2020     12/16/2020     Lab Results   Component Value Date    ALT <5 (L) 09/03/2020    AST 11 (L) 09/03/2020    ALKPHOS 50 09/03/2020    BILITOT 0.4 09/03/2020     TSH (uIU/mL)   Date Value   09/03/2020 2.02     Lab Results   Component Value Date    LABA1C 9.0 12/10/2020      Objective:   PHYSICAL EXAM:  LMP  (LMP Unknown)   BP Readings from Last 5 Encounters:   12/10/20 138/76   09/06/20 (!) 151/75   07/09/20 132/62   03/09/20 116/72   01/23/20 130/80     Wt Readings from Last 5 Encounters:   12/10/20 183 lb (83 kg)   09/06/20 177 lb 0.5 oz (80.3 kg)   07/09/20 186 lb (84.4 kg)   03/09/20 189 lb (85.7 kg)   01/23/20 189 lb (85.7 kg)      GENERAL:   · well-developed, well-nourished, alert, no distress. EYES:   · External findings: lids and lashes normal and conjunctivae and sclerae normal  · Eyes: no periorbital cellulitis.   HENT:   · Normocephalic, atraumatic  · External nose and ears appear normal  · Mucous membranes are moist  · Hearing grossly normal.     NECK: No visible masses  LUNGS:    · Respiratory effort normal.  · No visualized signs of difficulty breathing or respiratory distress  SKIN: warm and dry  · No significant exanthematous lesions or discoloration noted on facial skin  PSYCH:    · Alert and oriented, able to follow commands  · Normal reasoning, insight good  · Normal affect  · No memory disturbance noted  NEURO:   No Facial Asymmetry (Cranial nerve 7 motor function) (limited exam to video visit)      No gaze palsy      Assessment and Plan:      Diagnosis Orders   1. Uncontrolled type 2 diabetes mellitus with hyperglycemia ([address]  Hoping a1c better since getting reminders to take insulin   2. Diabetic polyneuropathy associated with type 2 diabetes mellitus (HCC)  Hemoglobin A1C   3. Essential hypertension  Stable with current medications. Basic Metabolic Panel   4. Mixed hyperlipidemia  Stable with current medications. 5. Cerebral vascular disease  Stable with current medications. 6. Major depressive disorder with single episode, in full remission ([address]  Stable with current medications. 7. Iron deficiency anemia, unspecified iron deficiency anemia type  Recheck on iron. If better may be able to decrease iron to QD  CBC Auto Differential   8. Memory problem  DOP contacting pt three times per day to take insulin. Decline HHN at this time or moving. Both report this is working. Plan below. INSTRUCTIONS  · NEXT APPOINTMENT: Please schedule annual complete physical (30 minutes) in 3 months. · PLEASE GET BLOODWORK DRAWN TODAY ON FIRST FLOOR in 170. RESULTS- most blood tests back in couple days. We will call you if any problems. If bloodwork good, you will get letter in mail or notified thru [address] (if signed up) within 2 weeks. If you do not, please call office. · Call if med managing system with DOP not working. Virtual Visit (video visit) encounter employed to address concerns as mentioned above. A caregiver was present when appropriate.  Due to this being a TeleHealth encounter (During TNOVY-28 public health emergency), evaluation of the following organ systems was limited. Pursuant to the emergency declaration under the [address], [address] [place] [address] authority and the [employer] and [employer] Act, this Virtual Visit was conducted with patient's (and/or legal guardian's) consent, to reduce the patient's risk of exposure to COVID-19 and provide necessary medical care. The patient (and/or legal guardian) has also been advised to contact this office for worsening conditions or problems, and seek emergency medical treatment and/or call 911 if deemed necessary. Services were provided through a video synchronous discussion virtually to substitute for in-person clinic visit. Patient and provider were located at their individual homes. minutes: 11-20 minutes were spent on the digital evaluation and management of this patient. --Ramon Guidry MD on 3/19/2021 at 8:58 AM    An electronic signature was used to authenticate this note.

## 2021-03-22 DIAGNOSIS — I10 ESSENTIAL HYPERTENSION: ICD-10-CM

## 2021-03-22 DIAGNOSIS — E11.42 DIABETIC POLYNEUROPATHY ASSOCIATED WITH TYPE 2 DIABETES MELLITUS (HCC): ICD-10-CM

## 2021-03-22 DIAGNOSIS — D50.9 IRON DEFICIENCY ANEMIA, UNSPECIFIED IRON DEFICIENCY ANEMIA TYPE: ICD-10-CM

## 2021-03-22 LAB
ANION GAP SERPL CALCULATED.3IONS-SCNC: 13 MMOL/L (ref 3–16)
BASOPHILS ABSOLUTE: 0 K/UL (ref 0–0.2)
BASOPHILS RELATIVE PERCENT: 0.6 %
BUN BLDV-MCNC: 33 MG/DL (ref 7–20)
CALCIUM SERPL-MCNC: 9.4 MG/DL (ref 8.3–10.6)
CHLORIDE BLD-SCNC: 107 MMOL/L (ref 99–110)
CO2: 24 MMOL/L (ref 21–32)
CREAT SERPL-MCNC: 1.7 MG/DL (ref 0.6–1.2)
EOSINOPHILS ABSOLUTE: 0.3 K/UL (ref 0–0.6)
EOSINOPHILS RELATIVE PERCENT: 4.3 %
GFR AFRICAN AMERICAN: 35
GFR NON-AFRICAN AMERICAN: 29
GLUCOSE BLD-MCNC: 214 MG/DL (ref 70–99)
HCT VFR BLD CALC: 36 % (ref 36–48)
HEMOGLOBIN: 11.8 G/DL (ref 12–16)
LYMPHOCYTES ABSOLUTE: 1.6 K/UL (ref 1–5.1)
LYMPHOCYTES RELATIVE PERCENT: 23.6 %
MCH RBC QN AUTO: 28.2 PG (ref 26–34)
MCHC RBC AUTO-ENTMCNC: 32.7 G/DL (ref 31–36)
MCV RBC AUTO: 86.3 FL (ref 80–100)
MONOCYTES ABSOLUTE: 0.6 K/UL (ref 0–1.3)
MONOCYTES RELATIVE PERCENT: 9.6 %
NEUTROPHILS ABSOLUTE: 4.1 K/UL (ref 1.7–7.7)
NEUTROPHILS RELATIVE PERCENT: 61.9 %
PDW BLD-RTO: 19.3 % (ref 12.4–15.4)
PLATELET # BLD: 279 K/UL (ref 135–450)
PMV BLD AUTO: 9.5 FL (ref 5–10.5)
POTASSIUM SERPL-SCNC: 4.5 MMOL/L (ref 3.5–5.1)
RBC # BLD: 4.17 M/UL (ref 4–5.2)
SODIUM BLD-SCNC: 144 MMOL/L (ref 136–145)
WBC # BLD: 6.6 K/UL (ref 4–11)

## 2021-03-23 LAB
ESTIMATED AVERAGE GLUCOSE: 191.5 MG/DL
HBA1C MFR BLD: 8.3 %

## 2021-03-25 DIAGNOSIS — D50.9 IRON DEFICIENCY ANEMIA, UNSPECIFIED IRON DEFICIENCY ANEMIA TYPE: ICD-10-CM

## 2021-03-25 RX ORDER — FERROUS SULFATE 325(65) MG
325 TABLET ORAL
Qty: 200 TABLET | Refills: 3 | COMMUNITY
Start: 2021-03-25 | End: 2022-05-12

## 2021-06-21 ENCOUNTER — OFFICE VISIT (OUTPATIENT)
Dept: FAMILY MEDICINE CLINIC | Age: 81
End: 2021-06-21
Payer: MEDICARE

## 2021-06-21 VITALS
OXYGEN SATURATION: 98 % | SYSTOLIC BLOOD PRESSURE: 134 MMHG | HEART RATE: 79 BPM | DIASTOLIC BLOOD PRESSURE: 68 MMHG | RESPIRATION RATE: 16 BRPM | BODY MASS INDEX: 32.51 KG/M2 | HEIGHT: 63 IN | WEIGHT: 183.5 LBS | TEMPERATURE: 98.5 F

## 2021-06-21 DIAGNOSIS — E78.2 MIXED HYPERLIPIDEMIA: ICD-10-CM

## 2021-06-21 DIAGNOSIS — I10 ESSENTIAL HYPERTENSION: ICD-10-CM

## 2021-06-21 DIAGNOSIS — E53.8 B12 DEFICIENCY: ICD-10-CM

## 2021-06-21 DIAGNOSIS — E11.21 TYPE 2 DIABETES MELLITUS WITH DIABETIC NEPHROPATHY, WITH LONG-TERM CURRENT USE OF INSULIN (HCC): Primary | ICD-10-CM

## 2021-06-21 DIAGNOSIS — Z79.4 TYPE 2 DIABETES MELLITUS WITH DIABETIC NEPHROPATHY, WITH LONG-TERM CURRENT USE OF INSULIN (HCC): ICD-10-CM

## 2021-06-21 DIAGNOSIS — D50.9 IRON DEFICIENCY ANEMIA, UNSPECIFIED IRON DEFICIENCY ANEMIA TYPE: ICD-10-CM

## 2021-06-21 DIAGNOSIS — E11.21 TYPE 2 DIABETES MELLITUS WITH DIABETIC NEPHROPATHY, WITH LONG-TERM CURRENT USE OF INSULIN (HCC): ICD-10-CM

## 2021-06-21 DIAGNOSIS — Z79.4 TYPE 2 DIABETES MELLITUS WITH DIABETIC NEPHROPATHY, WITH LONG-TERM CURRENT USE OF INSULIN (HCC): Primary | ICD-10-CM

## 2021-06-21 LAB
A/G RATIO: 2.1 (ref 1.1–2.2)
ALBUMIN SERPL-MCNC: 4.6 G/DL (ref 3.4–5)
ALP BLD-CCNC: 112 U/L (ref 40–129)
ALT SERPL-CCNC: <5 U/L (ref 10–40)
ANION GAP SERPL CALCULATED.3IONS-SCNC: 15 MMOL/L (ref 3–16)
AST SERPL-CCNC: 11 U/L (ref 15–37)
BASOPHILS ABSOLUTE: 0.1 K/UL (ref 0–0.2)
BASOPHILS RELATIVE PERCENT: 1.3 %
BILIRUB SERPL-MCNC: 0.4 MG/DL (ref 0–1)
BUN BLDV-MCNC: 25 MG/DL (ref 7–20)
CALCIUM SERPL-MCNC: 9.7 MG/DL (ref 8.3–10.6)
CHLORIDE BLD-SCNC: 106 MMOL/L (ref 99–110)
CO2: 22 MMOL/L (ref 21–32)
CREAT SERPL-MCNC: 1.6 MG/DL (ref 0.6–1.2)
EOSINOPHILS ABSOLUTE: 0.2 K/UL (ref 0–0.6)
EOSINOPHILS RELATIVE PERCENT: 3.4 %
FERRITIN: 17.6 NG/ML (ref 15–150)
GFR AFRICAN AMERICAN: 37
GFR NON-AFRICAN AMERICAN: 31
GLOBULIN: 2.2 G/DL
GLUCOSE BLD-MCNC: 196 MG/DL (ref 70–99)
HBA1C MFR BLD: 8.4 %
HCT VFR BLD CALC: 39.5 % (ref 36–48)
HEMOGLOBIN: 13 G/DL (ref 12–16)
IRON SATURATION: 22 % (ref 15–50)
IRON: 78 UG/DL (ref 37–145)
LYMPHOCYTES ABSOLUTE: 1.9 K/UL (ref 1–5.1)
LYMPHOCYTES RELATIVE PERCENT: 27.4 %
MCH RBC QN AUTO: 29.9 PG (ref 26–34)
MCHC RBC AUTO-ENTMCNC: 32.9 G/DL (ref 31–36)
MCV RBC AUTO: 90.8 FL (ref 80–100)
MONOCYTES ABSOLUTE: 0.5 K/UL (ref 0–1.3)
MONOCYTES RELATIVE PERCENT: 7.5 %
NEUTROPHILS ABSOLUTE: 4.3 K/UL (ref 1.7–7.7)
NEUTROPHILS RELATIVE PERCENT: 60.4 %
PDW BLD-RTO: 16.1 % (ref 12.4–15.4)
PLATELET # BLD: 271 K/UL (ref 135–450)
PMV BLD AUTO: 10.1 FL (ref 5–10.5)
POTASSIUM SERPL-SCNC: 5.4 MMOL/L (ref 3.5–5.1)
RBC # BLD: 4.35 M/UL (ref 4–5.2)
SODIUM BLD-SCNC: 143 MMOL/L (ref 136–145)
TOTAL IRON BINDING CAPACITY: 361 UG/DL (ref 260–445)
TOTAL PROTEIN: 6.8 G/DL (ref 6.4–8.2)
VITAMIN B-12: 186 PG/ML (ref 211–911)
WBC # BLD: 7.1 K/UL (ref 4–11)

## 2021-06-21 PROCEDURE — 83036 HEMOGLOBIN GLYCOSYLATED A1C: CPT | Performed by: NURSE PRACTITIONER

## 2021-06-21 PROCEDURE — 1123F ACP DISCUSS/DSCN MKR DOCD: CPT | Performed by: NURSE PRACTITIONER

## 2021-06-21 PROCEDURE — 3288F FALL RISK ASSESSMENT DOCD: CPT | Performed by: NURSE PRACTITIONER

## 2021-06-21 PROCEDURE — 3052F HG A1C>EQUAL 8.0%<EQUAL 9.0%: CPT | Performed by: NURSE PRACTITIONER

## 2021-06-21 PROCEDURE — G8427 DOCREV CUR MEDS BY ELIG CLIN: HCPCS | Performed by: NURSE PRACTITIONER

## 2021-06-21 PROCEDURE — 1036F TOBACCO NON-USER: CPT | Performed by: NURSE PRACTITIONER

## 2021-06-21 PROCEDURE — 4040F PNEUMOC VAC/ADMIN/RCVD: CPT | Performed by: NURSE PRACTITIONER

## 2021-06-21 PROCEDURE — 1090F PRES/ABSN URINE INCON ASSESS: CPT | Performed by: NURSE PRACTITIONER

## 2021-06-21 PROCEDURE — 99214 OFFICE O/P EST MOD 30 MIN: CPT | Performed by: NURSE PRACTITIONER

## 2021-06-21 PROCEDURE — G8417 CALC BMI ABV UP PARAM F/U: HCPCS | Performed by: NURSE PRACTITIONER

## 2021-06-21 PROCEDURE — G8399 PT W/DXA RESULTS DOCUMENT: HCPCS | Performed by: NURSE PRACTITIONER

## 2021-06-21 RX ORDER — GLUCOSAMINE HCL/CHONDROITIN SU 500-400 MG
CAPSULE ORAL
Qty: 400 STRIP | Refills: 3 | Status: SHIPPED | OUTPATIENT
Start: 2021-06-21 | End: 2022-03-21

## 2021-06-21 RX ORDER — PEN NEEDLE, DIABETIC 31 G X1/4"
NEEDLE, DISPOSABLE MISCELLANEOUS
Qty: 400 EACH | Refills: 3 | Status: SHIPPED | OUTPATIENT
Start: 2021-06-21

## 2021-06-21 ASSESSMENT — ENCOUNTER SYMPTOMS
CHEST TIGHTNESS: 0
VOMITING: 0
SHORTNESS OF BREATH: 0
DIARRHEA: 0
COUGH: 0
NAUSEA: 0
CONSTIPATION: 0

## 2021-06-21 NOTE — PATIENT INSTRUCTIONS
Please check fasting blood sugar, before meal blood sugar, and two hours after meal blood sugar. Please write readings down and bring into next visit.

## 2021-06-21 NOTE — PROGRESS NOTES
6/21/2021    This is a 80 y.o. female   Chief Complaint   Patient presents with    Follow-up     chronic conditions   . HPI  Diabetes Mellitus Type 2:    Checks blood sugar twice daily. She did not bring in her BS readings. Reports that she has had three episodes of hypoglycemia since her last visit. BS was in the 50s. She will then eat and feel better. Sometimes her fasting BS will be as high as 200. When she checks in the evening BS will be around 200. Taking metformin 1000 mg BID. On humalog mix 75/25 with 55 units in AM, 18 units with lunch and 12 units with diner. Any episodes of hypoglycemia? yes - see above  Eye exam current (within one year): no  Tobacco history: She  reports that she quit smoking about 19 years ago. She has a 7.50 pack-year smoking history. She has never used smokeless tobacco.     Hypertension:  Home blood pressure monitoring: No.  She is not adherent to a low sodium diet. Patient denies chest pain, shortness of breath, peripheral edema, palpitations and dry cough. Antihypertensive medication side effects: no medication side effects noted. Hyperlipidemia:  No new myalgias or GI upset on atorvastatin (Lipitor). Lab Results   Component Value Date    LABA1C 8.3 03/22/2021    LABA1C 9.0 12/10/2020    LABA1C 8.6 09/03/2020     Lab Results   Component Value Date    LABMICR YES 09/02/2020    CREATININE 1.7 (H) 03/22/2021     Lab Results   Component Value Date    ALT <5 (L) 09/03/2020    AST 11 (L) 09/03/2020     Lab Results   Component Value Date    CHOL 137 12/16/2020    TRIG 85 12/16/2020    HDL 55 12/16/2020    LDLCALC 65 12/16/2020        CKD- Does not take NSAIDs. Iron deficiency- reports that she has not been taking her iron supplement. She is not sure when she stopped taking. She is not sure if she ever took. Vitamin B12 deficiency- She has not been taking the vitamin B12 supplement. She is not sure when she stopped taking.  She is not sure if she ever took medication. Patient Active Problem List   Diagnosis    DM type 2 (diabetes mellitus, type 2) [facility] [facility]    Essential hypertension    Hyperlipidemia    Osteopenia DXA -1.6 (7/10)    Nephropathy, diabetic [facility] [address]    Edema    Generalized osteoarthritis of multiple sites    Colon polyp- last colon 9/10    Needs flu shot    Fecal urgency    GERD (gastroesophageal reflux disease)    Major depressive disorder, single episode, mild (HCC)    Rotator cuff strain    Brachial plexopathy    Carpal tunnel syndrome of right wrist    Incomplete tear of right rotator cuff    Left thyroid nodule by MRI 2016    Diabetic polyneuropathy associated with type 2 diabetes mellitus ([facility] [address]    Primary osteoarthritis of right knee    History of CVA (cerebrovascular accident) 2017 by MRI, no Sx    Cerebral vascular disease    Stenosis of right carotid artery GERTRUDE < 50% (6/2017), repeat 1 year    Urinary retention    Chronic renal disease, stage 3, moderately decreased glomerular filtration rate (GFR) between 30-59 mL/min/1.73 square meter (HCC)    Actinic keratosis of left cheek    Hypoglycemia    Acute [facility] superimposed on CKD ([facility]    B12 deficiency    Iron deficiency anemia          Current Outpatient Medications   Medication Sig Dispense Refill    FLUoxetine (PROZAC) 40 MG capsule TAKE 1 CAPSULE BY MOUTH  ONCE DAILY 90 capsule 1    quinapril (ACCUPRIL) 40 MG tablet TAKE 1 TABLET BY MOUTH ONCE DAILY 90 tablet 3    clopidogrel (PLAVIX) 75 MG tablet TAKE 1 TABLET BY MOUTH  DAILY 90 tablet 3    metFORMIN (GLUCOPHAGE) 1000 MG tablet TAKE 1 TABLET BY MOUTH  TWICE DAILY WITH MEALS 180 tablet 1    atorvastatin (LIPITOR) 40 MG tablet TAKE 1 TABLET BY MOUTH ONCE DAILY 90 tablet 1    blood glucose monitor strips Test 4 times a day & as needed for symptoms of irregular blood glucose. Dispense sufficient amount for indicated testing frequency plus additional to accommodate PRN testing needs.   accu check test strips. 400 strip 3    amLODIPine (NORVASC) 10 MG tablet Take 1 tablet by mouth daily 90 tablet 3    blood glucose monitor kit and supplies Test 2 times a day & as needed for symptoms of irregular blood glucose. 1 kit 0    Lancets MISC 1 each by Does not apply route 4 times daily 400 each 3    oxybutynin (DITROPAN-XL) 10 MG extended release tablet Take 1 tablet by mouth daily 90 tablet 3    pantoprazole (PROTONIX) 40 MG tablet Take 1 tablet by mouth daily 90 tablet 3    insulin lispro protamine & lispro (HUMALOG MIX 75/25 KWIKPEN) (75-25) 100 UNIT per ML SUPN injection pen 55 units in AM, 18 units with lunch, 12 units before dinner 25 pen 3    blood glucose monitor strips Test 4 times a day & as needed for symptoms of irregular blood glucose. 400 strip 3    Insulin Pen Needle (TRUEPLUS PEN NEEDLES) 31G X 6 MM MISC Apply 1 each topically 3 times daily 400 each 3    Blood Glucose Monitoring Suppl (ACCU-CHEK WALTER PLUS) w/Device KIT USE TO TEST BLOOD SUGAR THREE TIMES DAILY Dx Code: E11.21 1 kit 0    Insulin Syringe-Needle U-100 31G X 5/16\" 1 ML MISC 1 each by Does not apply route 2 times daily 200 each 3    aspirin 325 MG tablet Take 1 tablet by mouth daily. 30 tablet 0    ferrous sulfate (IRON 325) 325 (65 Fe) MG tablet Take 1 tablet by mouth daily (with breakfast) (Patient not taking: Reported on 6/21/2021) 200 tablet 3    vitamin B-12 (CYANOCOBALAMIN) 1000 MCG tablet Take 1 tablet by mouth daily (Patient not taking: Reported on 6/21/2021) 30 tablet 3    Continuous Blood Gluc Sensor ([facility]) MISC as needed (Patient not taking: Reported on 6/21/2021) 2 each 5    Continuous Blood Gluc  (BionymYLE AVELINA 15 DAY READER) GREGORIO as needed (Patient not taking: Reported on 6/21/2021) 1 Device 0     No current facility-administered medications for this visit. No Known Allergies    Review of Systems   Constitutional: Negative for activity change.    Respiratory: Negative for cough, chest tightness and shortness of breath. Cardiovascular: Negative for chest pain. Gastrointestinal: Negative for constipation, diarrhea, nausea and vomiting. Neurological: Negative for dizziness, syncope, weakness and headaches. Psychiatric/Behavioral: Negative for confusion. Vitals:    06/21/21 0924   BP: 134/68   Site: Right Upper Arm   Position: Sitting   Cuff Size: Large Adult   Pulse: 79   Resp: 16   Temp: 98.5 °F (36.9 °C)   SpO2: 98%   Weight: 183 lb 8 oz (83.2 kg)   Height: 5' 3\" (1.6 m)       Body mass index is 32.51 kg/m². Wt Readings from Last 3 Encounters:   06/21/21 183 lb 8 oz (83.2 kg)   12/10/20 183 lb (83 kg)   09/06/20 177 lb 0.5 oz (80.3 kg)       BP Readings from Last 3 Encounters:   06/21/21 134/68   12/10/20 138/76   09/06/20 (!) 151/75       Physical Exam  Vitals and nursing note reviewed. Constitutional:       General: She is not in acute distress. Appearance: She is well-developed. She is obese. HENT:      Head: Normocephalic and atraumatic. Cardiovascular:      Rate and Rhythm: Normal rate and regular rhythm. Heart sounds: Normal heart sounds. No murmur heard. No friction rub. No gallop. Pulmonary:      Effort: Pulmonary effort is normal. No respiratory distress. Breath sounds: Normal breath sounds. Musculoskeletal:      Cervical back: Neck supple. Right lower leg: Edema present. Left lower leg: Edema present. Comments: +1 derrick ankle edema    Skin:     General: Skin is warm and dry. Neurological:      Mental Status: She is alert and oriented to person, place, and time. Psychiatric:         Behavior: Behavior normal.         Thought Content: Thought content normal.         Judgment: Judgment normal.         Assessmentand Plan  Onofre Urbano was seen today for follow-up.     Diagnoses and all orders for this visit:    Type 2 diabetes mellitus with diabetic nephropathy, with long-term current use of insulin ([address]  -     POCT glycosylated hemoglobin (Hb A1C)- 8.4%  -     Insulin Pen Needle (TRUEPLUS PEN NEEDLES) 31G X 6 MM MISC; Use to check blood sugar six times per day  -     Comprehensive Metabolic Panel; Future  -     blood glucose monitor strips; Test 6 times a day & as needed for symptoms of irregular blood glucose. Uncontrolled. Patient did not bring in log of blood sugars and she was not able to recall her readings. Advised to check fasting blood sugar, before meal blood sugar and 2 hours after meal blood sugar. She is to bring in log at visit in 2 weeks so insulin dose can be adjusted. Currently on Metformin 1000 mg twice daily and Humalog mix 75/25 55 units in a.m., 18 units with lunch, and 12 units before dinner  Advised to work on low carb diet    Essential hypertension  -     Comprehensive Metabolic Panel; Future  Controlled- on amlodipine 10 mg daily    Mixed hyperlipidemia  -     Comprehensive Metabolic Panel; Future  Lipids controlled 12/2020  On atorvastatin 40 mg daily     B12 deficiency  -     Vitamin B12; Future  Not currently taking supplement- will repeat level and start medication if needed    Iron deficiency anemia, unspecified iron deficiency anemia type  -     CBC Auto Differential; Future  -     Ferritin; Future  -     Iron and TIBC; Future  Not currently taking supplement- will repeat level and start medication if needed. Return in about 2 weeks (around 7/5/2021), or if symptoms worsen or fail to improve, for diabetes- bring in blood sugar log with Dr. Monaco Or .

## 2021-06-22 DIAGNOSIS — E87.5 HYPERKALEMIA: Primary | ICD-10-CM

## 2021-06-22 DIAGNOSIS — E53.8 B12 DEFICIENCY: ICD-10-CM

## 2021-06-22 RX ORDER — LANOLIN ALCOHOL/MO/W.PET/CERES
1000 CREAM (GRAM) TOPICAL DAILY
Qty: 30 TABLET | Refills: 2 | Status: SHIPPED | OUTPATIENT
Start: 2021-06-22 | End: 2022-06-24

## 2021-10-10 DIAGNOSIS — R35.1 NOCTURIA: ICD-10-CM

## 2021-10-11 ENCOUNTER — TELEPHONE (OUTPATIENT)
Dept: FAMILY MEDICINE CLINIC | Age: 81
End: 2021-10-11

## 2021-10-11 RX ORDER — OXYBUTYNIN CHLORIDE 10 MG/1
10 TABLET, EXTENDED RELEASE ORAL DAILY
Qty: 90 TABLET | Refills: 3 | Status: SHIPPED | OUTPATIENT
Start: 2021-10-11 | End: 2021-11-22

## 2021-10-21 ENCOUNTER — TELEPHONE (OUTPATIENT)
Dept: FAMILY MEDICINE CLINIC | Age: 81
End: 2021-10-21

## 2021-10-21 DIAGNOSIS — Z79.4 TYPE 2 DIABETES MELLITUS WITH DIABETIC NEPHROPATHY, WITH LONG-TERM CURRENT USE OF INSULIN (HCC): ICD-10-CM

## 2021-10-21 DIAGNOSIS — Z23 NEEDS FLU SHOT: ICD-10-CM

## 2021-10-21 DIAGNOSIS — E11.21 TYPE 2 DIABETES MELLITUS WITH DIABETIC NEPHROPATHY, WITH LONG-TERM CURRENT USE OF INSULIN (HCC): ICD-10-CM

## 2021-10-21 RX ORDER — INSULIN LISPRO 100 [IU]/ML
INJECTION, SUSPENSION SUBCUTANEOUS
Qty: 25 PEN | Refills: 3 | Status: CANCELLED | OUTPATIENT
Start: 2021-10-21

## 2021-11-05 DIAGNOSIS — E78.2 MIXED HYPERLIPIDEMIA: ICD-10-CM

## 2021-11-05 DIAGNOSIS — Z79.4 TYPE 2 DIABETES MELLITUS WITH DIABETIC NEPHROPATHY, WITH LONG-TERM CURRENT USE OF INSULIN (HCC): ICD-10-CM

## 2021-11-05 DIAGNOSIS — E11.21 TYPE 2 DIABETES MELLITUS WITH DIABETIC NEPHROPATHY, WITH LONG-TERM CURRENT USE OF INSULIN (HCC): ICD-10-CM

## 2021-11-05 RX ORDER — ATORVASTATIN CALCIUM 40 MG/1
TABLET, FILM COATED ORAL
Qty: 90 TABLET | Refills: 3 | Status: SHIPPED | OUTPATIENT
Start: 2021-11-05 | End: 2022-10-07

## 2021-11-22 ENCOUNTER — OFFICE VISIT (OUTPATIENT)
Dept: FAMILY MEDICINE CLINIC | Age: 81
End: 2021-11-22
Payer: MEDICARE

## 2021-11-22 VITALS
RESPIRATION RATE: 16 BRPM | SYSTOLIC BLOOD PRESSURE: 130 MMHG | WEIGHT: 185 LBS | HEART RATE: 80 BPM | BODY MASS INDEX: 32.78 KG/M2 | HEIGHT: 63 IN | DIASTOLIC BLOOD PRESSURE: 82 MMHG | OXYGEN SATURATION: 98 %

## 2021-11-22 DIAGNOSIS — N18.30 STAGE 3 CHRONIC KIDNEY DISEASE, UNSPECIFIED WHETHER STAGE 3A OR 3B CKD (HCC): ICD-10-CM

## 2021-11-22 DIAGNOSIS — H01.02A SQUAMOUS BLEPHARITIS OF UPPER AND LOWER EYELIDS OF BOTH EYES: ICD-10-CM

## 2021-11-22 DIAGNOSIS — Z23 NEEDS FLU SHOT: ICD-10-CM

## 2021-11-22 DIAGNOSIS — I10 ESSENTIAL HYPERTENSION: ICD-10-CM

## 2021-11-22 DIAGNOSIS — E11.42 DIABETIC POLYNEUROPATHY ASSOCIATED WITH TYPE 2 DIABETES MELLITUS (HCC): Primary | ICD-10-CM

## 2021-11-22 DIAGNOSIS — H01.02B SQUAMOUS BLEPHARITIS OF UPPER AND LOWER EYELIDS OF BOTH EYES: ICD-10-CM

## 2021-11-22 DIAGNOSIS — E78.2 MIXED HYPERLIPIDEMIA: ICD-10-CM

## 2021-11-22 DIAGNOSIS — Z86.73 HISTORY OF CVA (CEREBROVASCULAR ACCIDENT): ICD-10-CM

## 2021-11-22 DIAGNOSIS — R19.7 DIARRHEA OF PRESUMED INFECTIOUS ORIGIN: ICD-10-CM

## 2021-11-22 DIAGNOSIS — K52.9 CHRONIC DIARRHEA: ICD-10-CM

## 2021-11-22 DIAGNOSIS — R35.89 POLYURIA: ICD-10-CM

## 2021-11-22 PROBLEM — R33.9 URINARY RETENTION: Status: RESOLVED | Noted: 2017-10-19 | Resolved: 2021-11-22

## 2021-11-22 PROBLEM — L57.0 ACTINIC KERATOSIS OF LEFT CHEEK: Status: RESOLVED | Noted: 2018-12-05 | Resolved: 2021-11-22

## 2021-11-22 PROBLEM — N17.9 ACUTE KIDNEY INJURY SUPERIMPOSED ON CKD (HCC): Status: RESOLVED | Noted: 2020-09-02 | Resolved: 2021-11-22

## 2021-11-22 PROBLEM — E16.2 HYPOGLYCEMIA: Status: RESOLVED | Noted: 2020-09-02 | Resolved: 2021-11-22

## 2021-11-22 PROBLEM — D50.9 IRON DEFICIENCY ANEMIA: Status: RESOLVED | Noted: 2021-01-05 | Resolved: 2021-11-22

## 2021-11-22 PROBLEM — N18.9 ACUTE KIDNEY INJURY SUPERIMPOSED ON CKD (HCC): Status: RESOLVED | Noted: 2020-09-02 | Resolved: 2021-11-22

## 2021-11-22 LAB
ANION GAP SERPL CALCULATED.3IONS-SCNC: 18 MMOL/L (ref 3–16)
BUN BLDV-MCNC: 28 MG/DL (ref 7–20)
CALCIUM SERPL-MCNC: 9 MG/DL (ref 8.3–10.6)
CHLORIDE BLD-SCNC: 108 MMOL/L (ref 99–110)
CO2: 19 MMOL/L (ref 21–32)
CREAT SERPL-MCNC: 1.6 MG/DL (ref 0.6–1.2)
GFR AFRICAN AMERICAN: 37
GFR NON-AFRICAN AMERICAN: 31
GLUCOSE BLD-MCNC: 151 MG/DL (ref 70–99)
HBA1C MFR BLD: 8.4 %
POTASSIUM SERPL-SCNC: 4.5 MMOL/L (ref 3.5–5.1)
SODIUM BLD-SCNC: 145 MMOL/L (ref 136–145)

## 2021-11-22 PROCEDURE — 3052F HG A1C>EQUAL 8.0%<EQUAL 9.0%: CPT | Performed by: FAMILY MEDICINE

## 2021-11-22 PROCEDURE — G8417 CALC BMI ABV UP PARAM F/U: HCPCS | Performed by: FAMILY MEDICINE

## 2021-11-22 PROCEDURE — G8484 FLU IMMUNIZE NO ADMIN: HCPCS | Performed by: FAMILY MEDICINE

## 2021-11-22 PROCEDURE — 83036 HEMOGLOBIN GLYCOSYLATED A1C: CPT | Performed by: FAMILY MEDICINE

## 2021-11-22 PROCEDURE — 1036F TOBACCO NON-USER: CPT | Performed by: FAMILY MEDICINE

## 2021-11-22 PROCEDURE — G8399 PT W/DXA RESULTS DOCUMENT: HCPCS | Performed by: FAMILY MEDICINE

## 2021-11-22 PROCEDURE — 4040F PNEUMOC VAC/ADMIN/RCVD: CPT | Performed by: FAMILY MEDICINE

## 2021-11-22 PROCEDURE — 1123F ACP DISCUSS/DSCN MKR DOCD: CPT | Performed by: FAMILY MEDICINE

## 2021-11-22 PROCEDURE — 99214 OFFICE O/P EST MOD 30 MIN: CPT | Performed by: FAMILY MEDICINE

## 2021-11-22 PROCEDURE — G8427 DOCREV CUR MEDS BY ELIG CLIN: HCPCS | Performed by: FAMILY MEDICINE

## 2021-11-22 PROCEDURE — 1090F PRES/ABSN URINE INCON ASSESS: CPT | Performed by: FAMILY MEDICINE

## 2021-11-22 RX ORDER — ERYTHROMYCIN 5 MG/G
OINTMENT OPHTHALMIC
Qty: 3.5 G | Refills: 0 | Status: SHIPPED | OUTPATIENT
Start: 2021-11-22 | End: 2021-11-29

## 2021-11-22 NOTE — PROGRESS NOTES
DIABETES MELLITUS FOLOW-UP  Subjective:      Chief Complaint   Patient presents with    Diabetes     Michelle Brewster is an 80 y.o. female who presents for follow up of following chronic problems:  1. Diabetic polyneuropathy associated with type 2 diabetes mellitus ([address]    2. Needs flu shot    3. Stage 3 chronic kidney disease, unspecified whether stage 3a or 3b CKD ([address]    4. Polyuria    5. Essential hypertension    6. Mixed hyperlipidemia    7. History of CVA (cerebrovascular accident) 2017 by MRI, no Sx    8. Squamous blepharitis of upper and lower eyelids of both eyes    9. Chronic diarrhea    10. Diarrhea of presumed infectious origin      Complaints: pt is having urinary frequency a few months every 2 hours. Failed oxybuynin  · she is having issues with diarrhea she can't hold it anymore. She has to be very close to the bathroom. It's all the time sometimes 2-3 times a day and it's watery, no blood. Has had incontinence. She had a colonoscopy years ago. · She also had issues with her right hip sometimes it hurts when she lays in bed. · Usually only takes insulin 50 U of 75/25 in AM. Takes 0-4 with dinner. · Itchy along eyelash lines x months. · Patient checks sugars 2  time(s) daily. · Any low sugars? No  · Patent follows diabetic diet? No  · Exercise: walking intermittently  · Taking medicines daily as directed? Yes  · Any side effects of medications? No    A1c is 8.4 today. Review of Systems   Ophthalmic ROS: change in vision? No  Respiratory ROS: Shortness of breath? No  Cardiovascular ROS: chest pain? No  Neurological ROS: Numbness/tingling? No  Dermatological ROS: rash or sores on feet?  No    Health Maintenance Due   Topic Date Due    Shingles Vaccine (2 of 3) 02/06/2017    DTaP/Tdap/Td vaccine (2 - Td or Tdap) 07/15/2020    Annual Wellness Visit (AWV)  06/09/2021    Flu vaccine (1) 09/01/2021     *Chief complaint, HPI, History and ROS provided by the medical assistant has been reviewed and verified by provider- Annelise Zarate MD    CHART REVIEW   reports that she quit smoking about 19 years ago. She has a 7.50 pack-year smoking history. She has never used smokeless tobacco.  Health Maintenance Due   Topic Date Due    Shingles Vaccine (2 of 3) 02/06/2017    DTaP/Tdap/Td vaccine (2 - Td or Tdap) 07/15/2020    Annual Wellness Visit (AWV)  06/09/2021    Flu vaccine (1) 09/01/2021     Current Outpatient Medications   Medication Instructions    amLODIPine (NORVASC) 10 mg, Oral, DAILY    aspirin 325 mg, Oral, DAILY    atorvastatin (LIPITOR) 40 MG tablet TAKE 1 TABLET BY MOUTH ONCE DAILY    blood glucose monitor kit and supplies Test 2 times a day & as needed for symptoms of irregular blood glucose.  blood glucose monitor strips Test 4 times a day & as needed for symptoms of irregular blood glucose. Dispense sufficient amount for indicated testing frequency plus additional to accommodate PRN testing needs. accu check test strips.  blood glucose monitor strips Test 6 times a day & as needed for symptoms of irregular blood glucose.  Blood Glucose Monitoring Suppl (ACCU-CHEK WALTER PLUS) w/Device KIT USE TO TEST BLOOD SUGAR THREE TIMES DAILY Dx Code: E11.21    clopidogrel (PLAVIX) 75 MG tablet TAKE 1 TABLET BY MOUTH  DAILY    Continuous Blood Gluc  (FREESTYLE AVELINA 14 DAY READER) GREGORIO as needed    Continuous Blood Gluc Sensor ([address] [place] [address]) MISC as needed    erythromycin (ROMYCIN) 5 MG/GM ophthalmic ointment Small amount to affected eye twice a day.     ferrous sulfate (IRON 325) 325 mg, Oral, DAILY WITH BREAKFAST    FLUoxetine (PROZAC) 40 MG capsule TAKE 1 CAPSULE BY MOUTH  ONCE DAILY    insulin lispro protamine & lispro (HUMALOG MIX 75/25 KWIKPEN) (75-25) 100 UNIT per ML SUPN injection pen 55 units in AM, 18 units with lunch, 12 units before dinner    Insulin Pen Needle (TRUEPLUS PEN NEEDLES) 31G X 6 MM MISC Use to check blood sugar six times per day    Insulin Syringe-Needle U-100 31G X 5/16\" 1 ML MISC 1 each, Does not apply, 2 TIMES DAILY    Lancets MISC 1 each, Does not apply, 4 TIMES DAILY    metFORMIN (GLUCOPHAGE) 1000 MG tablet TAKE 1 TABLET BY MOUTH  TWICE DAILY WITH MEALS    pantoprazole (PROTONIX) 40 mg, Oral, DAILY    quinapril (ACCUPRIL) 40 MG tablet TAKE 1 TABLET BY MOUTH ONCE DAILY    vitamin B-12 (CYANOCOBALAMIN) 1,000 mcg, Oral, DAILY     LAST LABS  LDL Calculated   Date Value Ref Range Status   12/16/2020 65 <100 mg/dL Final     Lab Results   Component Value Date    HDL 55 12/16/2020     Lab Results   Component Value Date    TRIG 85 12/16/2020     Lab Results   Component Value Date     06/21/2021    K 5.4 (H) 06/21/2021    CREATININE 1.6 (H) 06/21/2021     Lab Results   Component Value Date    WBC 7.1 06/21/2021    HGB 13.0 06/21/2021     06/21/2021     Lab Results   Component Value Date    ALT <5 (L) 06/21/2021    AST 11 (L) 06/21/2021    ALKPHOS 112 06/21/2021    BILITOT 0.4 06/21/2021     TSH (uIU/mL)   Date Value   09/03/2020 2.02     Lab Results   Component Value Date    GLUCOSE 196 (H) 06/21/2021     Lab Results   Component Value Date    LABA1C 8.4 06/21/2021    LABA1C 8.3 03/22/2021    LABA1C 9.0 12/10/2020     Objective:   PHYSICAL EXAM   /82 (Site: Left Upper Arm, Position: Sitting, Cuff Size: Large Adult)   Pulse 80   Resp 16   Ht 5' 3\" (1.6 m)   Wt 185 lb (83.9 kg)   LMP  (LMP Unknown)   SpO2 98%   BMI 32.77 kg/m²   BP Readings from Last 5 Encounters:   11/22/21 130/82   06/21/21 134/68   12/10/20 138/76   09/06/20 (!) 151/75   07/09/20 132/62     Wt Readings from Last 5 Encounters:   11/22/21 185 lb (83.9 kg)   06/21/21 183 lb 8 oz (83.2 kg)   12/10/20 183 lb (83 kg)   09/06/20 177 lb 0.5 oz (80.3 kg)   07/09/20 186 lb (84.4 kg)      GENERAL:   · well-developed, well-nourished, alert, no distress.      LUNGS:    · Breathing unlabored  · clear to auscultation bilaterally and good air movement  CARDIOVASC:   · regular rate and rhythm  · LEGS:  Lower extremity edema: none    SKIN: warm and dry       Assessment and Plan:      Diagnosis Orders   1. Diabetic polyneuropathy associated with type 2 diabetes mellitus ([facility] [address]     2. Needs flu shot  INFLUENZA, QUADV, ADJUVANTED, 72 YRS =, IM, PF, PREFILL SYR, 0.5ML (FLUAD)   3. Stage 3 chronic kidney disease, unspecified whether stage 3a or 3b CKD (HCC)  Basic Metabolic Panel   4. Polyuria  POCT Urinalysis no Micro   5. Essential hypertension     6. Mixed hyperlipidemia     7. History of CVA (cerebrovascular accident) 2017 by MRI, no Sx     8. Squamous blepharitis of upper and lower eyelids of both eyes  erythromycin (ROMYCIN) 5 MG/GM ophthalmic ointment   9. Chronic diarrhea  Cryptosporidium Antigen, Stool    Giardia antigen   10. Diarrhea of presumed infectious origin     worse     Continue current Tx plan. Any changes marked below. INSTRUCTIONS  NEXT APPOINTMENT: Please schedule check-up in 3 months. · PLEASE TAKE THIS FORM TO CHECK-OUT WINDOW TO SCHEDULE NEXT VISIT. · PLEASE GET BLOODWORK DRAWN TODAY ON FIRST FLOOR in 170. Take orders with you. RESULTS- most blood tests back in couple days. We will call you if any problems. If bloodwork good, you will get letter in mail or notified thru [address] (if signed up) within 2 weeks. If you do not, please call office. · STOP metformin. · INCREASE insulin to 60 in AM and 5-10 evening. · Use water with baby shampoo in it to wash eyes. Use antibiotic ointment when gets red. · Do stool cultures. OTC Culturelle/acidophilus/probiotic for a month to help gut bacteria.

## 2021-11-22 NOTE — PATIENT INSTRUCTIONS
INSTRUCTIONS  NEXT APPOINTMENT: Please schedule check-up in 3 months. · PLEASE TAKE THIS FORM TO CHECK-OUT WINDOW TO SCHEDULE NEXT VISIT. · PLEASE GET BLOODWORK DRAWN TODAY ON FIRST FLOOR in 170. Take orders with you. RESULTS- most blood tests back in couple days. We will call you if any problems. If bloodwork good, you will get letter in mail or notified thru [address] (if signed up) within 2 weeks. If you do not, please call office. · STOP metformin. · INCREASE insulin to 60 in AM and 5-10 evening. · Use water with baby shampoo in it to wash eyes. Use antibiotic ointment when gets red. · Do stool cultures. OTC Culturelle/acidophilus/probiotic for a month to help gut bacteria. Patient Education       BLEPHARITIS    Blepharitis is swelling or inflammation of the eyelids, usually where the eyelash hair follicles are located. Causes  In people with blepharitis, too much oil is produced by the glands near the eyelid. The exact reason for this problem is not known. Blepharitis is more likely to be seen with:  A skin condition called seborrheic dermatitis or seborrhea, which often involves the scalp, eyebrows, eyelids, behind the ears, and creases of the nose   Allergies and lice that affect the eyelashes (less common)   Excess growth of the bacteria that are normally found on the skin   Rosacea -- a skin condition that makes the face turn red   Blepharitis may be linked to repeated styes and chalazia. Symptoms  The eyelids appear red and irritated, with scales that stick to the base of the eyelashes. The eyelids may be:  Burning   Crusty   Itching   Reddened   Swollen   You may feel like you have sand or dust in your eye when you blink. Sometimes, the eyelashes may fall out and the eyelids may become scarred. Exams and Tests  An examination of the eyelids during an eye examination is usually enough to diagnose blepharitis.     Treatment  Careful daily cleansing of the eyelid edges helps remove the skin oils that cause bacteria to grow too much. Your health care provider might recommend using baby shampoo or special cleansers. Antibiotic ointments may also be helpful. If you have blepharitis:  Apply warm compresses to your eyes for 5 minutes, at least two times per day. Using a cotton swab, gently rub a solution of warm water and no-tears baby shampoo along your eyelid where the lash meets the lid. Do this in the morning and before you go to bed. [place] (Prognosis)  The likely outcome is good with treatment. You may need to keep the eyelid clean to prevent repeated problems. Continuing treatment will make the eyes less red and more comfortable. When to Contact a Medical Professional  Call for an appointment with your health care provider if symptoms worsen or do not improve after careful cleansing of the eyelids for several days. Prevention  Cleaning eyelids carefully will help prevent blepharitis. If a specific skin condition is present, it should be treated.     Alternative Names  Eyelid inflammation

## 2021-12-02 ENCOUNTER — TELEPHONE (OUTPATIENT)
Dept: FAMILY MEDICINE CLINIC | Age: 81
End: 2021-12-02

## 2021-12-02 DIAGNOSIS — K52.9 CHRONIC DIARRHEA: ICD-10-CM

## 2021-12-03 LAB
CRYPTOSPORIDIUM ANTIGEN STOOL: NORMAL
GIARDIA ANTIGEN STOOL: NORMAL

## 2021-12-12 DIAGNOSIS — F32.5 MAJOR DEPRESSIVE DISORDER WITH SINGLE EPISODE, IN FULL REMISSION (HCC): ICD-10-CM

## 2021-12-12 DIAGNOSIS — E78.2 MIXED HYPERLIPIDEMIA: ICD-10-CM

## 2021-12-13 RX ORDER — AMLODIPINE BESYLATE 10 MG/1
10 TABLET ORAL DAILY
Qty: 90 TABLET | Refills: 3 | Status: SHIPPED | OUTPATIENT
Start: 2021-12-13

## 2021-12-13 RX ORDER — FLUOXETINE HYDROCHLORIDE 40 MG/1
CAPSULE ORAL
Qty: 90 CAPSULE | Refills: 3 | Status: SHIPPED | OUTPATIENT
Start: 2021-12-13

## 2021-12-16 DIAGNOSIS — Z23 NEEDS FLU SHOT: ICD-10-CM

## 2021-12-16 DIAGNOSIS — E11.21 TYPE 2 DIABETES MELLITUS WITH DIABETIC NEPHROPATHY, WITH LONG-TERM CURRENT USE OF INSULIN (HCC): ICD-10-CM

## 2021-12-16 DIAGNOSIS — Z79.4 TYPE 2 DIABETES MELLITUS WITH DIABETIC NEPHROPATHY, WITH LONG-TERM CURRENT USE OF INSULIN (HCC): ICD-10-CM

## 2021-12-16 RX ORDER — INSULIN LISPRO 100 [IU]/ML
INJECTION, SUSPENSION SUBCUTANEOUS
Qty: 25 PEN | Refills: 3 | Status: SHIPPED | OUTPATIENT
Start: 2021-12-16 | End: 2022-05-12 | Stop reason: SDUPTHER

## 2021-12-27 ENCOUNTER — TELEPHONE (OUTPATIENT)
Dept: FAMILY MEDICINE CLINIC | Age: 81
End: 2021-12-27

## 2022-03-09 DIAGNOSIS — K21.9 GASTROESOPHAGEAL REFLUX DISEASE WITHOUT ESOPHAGITIS: ICD-10-CM

## 2022-03-09 RX ORDER — PANTOPRAZOLE SODIUM 40 MG/1
40 TABLET, DELAYED RELEASE ORAL DAILY
Qty: 90 TABLET | Refills: 3 | Status: SHIPPED | OUTPATIENT
Start: 2022-03-09

## 2022-03-21 RX ORDER — BLOOD SUGAR DIAGNOSTIC
STRIP MISCELLANEOUS
Qty: 400 STRIP | Refills: 5 | Status: SHIPPED | OUTPATIENT
Start: 2022-03-21

## 2022-05-10 DIAGNOSIS — I67.9 CEREBRAL VASCULAR DISEASE: ICD-10-CM

## 2022-05-10 DIAGNOSIS — I10 ESSENTIAL HYPERTENSION: ICD-10-CM

## 2022-05-10 DIAGNOSIS — I65.21 STENOSIS OF RIGHT CAROTID ARTERY: ICD-10-CM

## 2022-05-10 RX ORDER — CLOPIDOGREL BISULFATE 75 MG/1
TABLET ORAL
Qty: 30 TABLET | Refills: 0 | Status: SHIPPED | OUTPATIENT
Start: 2022-05-10 | End: 2022-06-03

## 2022-05-10 RX ORDER — QUINAPRIL 40 MG/1
TABLET ORAL
Qty: 30 TABLET | Refills: 0 | Status: SHIPPED | OUTPATIENT
Start: 2022-05-10 | End: 2022-06-03

## 2022-05-12 ENCOUNTER — OFFICE VISIT (OUTPATIENT)
Dept: FAMILY MEDICINE CLINIC | Age: 82
End: 2022-05-12
Payer: MEDICARE

## 2022-05-12 ENCOUNTER — TELEPHONE (OUTPATIENT)
Dept: FAMILY MEDICINE CLINIC | Age: 82
End: 2022-05-12

## 2022-05-12 VITALS
HEART RATE: 55 BPM | OXYGEN SATURATION: 98 % | SYSTOLIC BLOOD PRESSURE: 148 MMHG | RESPIRATION RATE: 18 BRPM | BODY MASS INDEX: 34.2 KG/M2 | DIASTOLIC BLOOD PRESSURE: 78 MMHG | HEIGHT: 63 IN | WEIGHT: 193 LBS

## 2022-05-12 DIAGNOSIS — E53.8 B12 DEFICIENCY: ICD-10-CM

## 2022-05-12 DIAGNOSIS — F32.5 MAJOR DEPRESSIVE DISORDER WITH SINGLE EPISODE, IN FULL REMISSION (HCC): ICD-10-CM

## 2022-05-12 DIAGNOSIS — N18.30 STAGE 3 CHRONIC KIDNEY DISEASE, UNSPECIFIED WHETHER STAGE 3A OR 3B CKD (HCC): ICD-10-CM

## 2022-05-12 DIAGNOSIS — I67.9 CEREBRAL VASCULAR DISEASE: ICD-10-CM

## 2022-05-12 DIAGNOSIS — E11.21 TYPE 2 DIABETES MELLITUS WITH DIABETIC NEPHROPATHY, WITH LONG-TERM CURRENT USE OF INSULIN (HCC): ICD-10-CM

## 2022-05-12 DIAGNOSIS — Z79.4 TYPE 2 DIABETES MELLITUS WITH DIABETIC NEPHROPATHY, WITH LONG-TERM CURRENT USE OF INSULIN (HCC): ICD-10-CM

## 2022-05-12 DIAGNOSIS — E11.65 UNCONTROLLED TYPE 2 DIABETES MELLITUS WITH HYPERGLYCEMIA (HCC): Primary | ICD-10-CM

## 2022-05-12 LAB
A/G RATIO: 1.8 (ref 1.1–2.2)
ALBUMIN SERPL-MCNC: 4.8 G/DL (ref 3.4–5)
ALP BLD-CCNC: 120 U/L (ref 40–129)
ALT SERPL-CCNC: <5 U/L (ref 10–40)
ANION GAP SERPL CALCULATED.3IONS-SCNC: 15 MMOL/L (ref 3–16)
AST SERPL-CCNC: 11 U/L (ref 15–37)
BASOPHILS ABSOLUTE: 0 K/UL (ref 0–0.2)
BASOPHILS RELATIVE PERCENT: 0.7 %
BILIRUB SERPL-MCNC: 0.5 MG/DL (ref 0–1)
BUN BLDV-MCNC: 29 MG/DL (ref 7–20)
CALCIUM SERPL-MCNC: 10.7 MG/DL (ref 8.3–10.6)
CHLORIDE BLD-SCNC: 104 MMOL/L (ref 99–110)
CHOLESTEROL, TOTAL: 164 MG/DL (ref 0–199)
CO2: 23 MMOL/L (ref 21–32)
CREAT SERPL-MCNC: 1.8 MG/DL (ref 0.6–1.2)
EOSINOPHILS ABSOLUTE: 0.1 K/UL (ref 0–0.6)
EOSINOPHILS RELATIVE PERCENT: 2.3 %
GFR AFRICAN AMERICAN: 33
GFR NON-AFRICAN AMERICAN: 27
GLUCOSE BLD-MCNC: 281 MG/DL (ref 70–99)
HBA1C MFR BLD: 10.6 %
HCT VFR BLD CALC: 39.5 % (ref 36–48)
HDLC SERPL-MCNC: 68 MG/DL (ref 40–60)
HEMOGLOBIN: 12.8 G/DL (ref 12–16)
LDL CHOLESTEROL CALCULATED: 74 MG/DL
LYMPHOCYTES ABSOLUTE: 1.7 K/UL (ref 1–5.1)
LYMPHOCYTES RELATIVE PERCENT: 28.1 %
MCH RBC QN AUTO: 27.9 PG (ref 26–34)
MCHC RBC AUTO-ENTMCNC: 32.3 G/DL (ref 31–36)
MCV RBC AUTO: 86.3 FL (ref 80–100)
MONOCYTES ABSOLUTE: 0.5 K/UL (ref 0–1.3)
MONOCYTES RELATIVE PERCENT: 9 %
NEUTROPHILS ABSOLUTE: 3.6 K/UL (ref 1.7–7.7)
NEUTROPHILS RELATIVE PERCENT: 59.9 %
PDW BLD-RTO: 15.3 % (ref 12.4–15.4)
PLATELET # BLD: 266 K/UL (ref 135–450)
PMV BLD AUTO: 9.9 FL (ref 5–10.5)
POTASSIUM SERPL-SCNC: 5.3 MMOL/L (ref 3.5–5.1)
RBC # BLD: 4.58 M/UL (ref 4–5.2)
SODIUM BLD-SCNC: 142 MMOL/L (ref 136–145)
TOTAL PROTEIN: 7.4 G/DL (ref 6.4–8.2)
TRIGL SERPL-MCNC: 111 MG/DL (ref 0–150)
VITAMIN B-12: 311 PG/ML (ref 211–911)
VLDLC SERPL CALC-MCNC: 22 MG/DL
WBC # BLD: 6.1 K/UL (ref 4–11)

## 2022-05-12 PROCEDURE — G8427 DOCREV CUR MEDS BY ELIG CLIN: HCPCS | Performed by: FAMILY MEDICINE

## 2022-05-12 PROCEDURE — 83036 HEMOGLOBIN GLYCOSYLATED A1C: CPT | Performed by: FAMILY MEDICINE

## 2022-05-12 PROCEDURE — 3046F HEMOGLOBIN A1C LEVEL >9.0%: CPT | Performed by: FAMILY MEDICINE

## 2022-05-12 PROCEDURE — 99214 OFFICE O/P EST MOD 30 MIN: CPT | Performed by: FAMILY MEDICINE

## 2022-05-12 PROCEDURE — 1123F ACP DISCUSS/DSCN MKR DOCD: CPT | Performed by: FAMILY MEDICINE

## 2022-05-12 PROCEDURE — G8417 CALC BMI ABV UP PARAM F/U: HCPCS | Performed by: FAMILY MEDICINE

## 2022-05-12 PROCEDURE — 1036F TOBACCO NON-USER: CPT | Performed by: FAMILY MEDICINE

## 2022-05-12 PROCEDURE — G8399 PT W/DXA RESULTS DOCUMENT: HCPCS | Performed by: FAMILY MEDICINE

## 2022-05-12 PROCEDURE — 4040F PNEUMOC VAC/ADMIN/RCVD: CPT | Performed by: FAMILY MEDICINE

## 2022-05-12 PROCEDURE — 1090F PRES/ABSN URINE INCON ASSESS: CPT | Performed by: FAMILY MEDICINE

## 2022-05-12 RX ORDER — BLOOD-GLUCOSE,RECEIVER,CONT
EACH MISCELLANEOUS
Qty: 1 EACH | Refills: 0 | Status: SHIPPED | OUTPATIENT
Start: 2022-05-12

## 2022-05-12 RX ORDER — INSULIN LISPRO 100 [IU]/ML
INJECTION, SUSPENSION SUBCUTANEOUS
Qty: 25 PEN | Refills: 3 | Status: SHIPPED | OUTPATIENT
Start: 2022-05-12 | End: 2022-05-26 | Stop reason: SDUPTHER

## 2022-05-12 RX ORDER — BLOOD SUGAR DIAGNOSTIC
1 STRIP MISCELLANEOUS 2 TIMES DAILY
Qty: 200 EACH | Refills: 5 | Status: SHIPPED | OUTPATIENT
Start: 2022-05-12 | End: 2022-05-12

## 2022-05-12 RX ORDER — BLOOD-GLUCOSE TRANSMITTER
EACH MISCELLANEOUS
Qty: 1 EACH | Refills: 0 | Status: SHIPPED | OUTPATIENT
Start: 2022-05-12

## 2022-05-12 RX ORDER — BLOOD-GLUCOSE SENSOR
EACH MISCELLANEOUS
Qty: 4 EACH | Refills: 5 | Status: SHIPPED | OUTPATIENT
Start: 2022-05-12

## 2022-05-12 ASSESSMENT — PATIENT HEALTH QUESTIONNAIRE - PHQ9
SUM OF ALL RESPONSES TO PHQ9 QUESTIONS 1 & 2: 0
SUM OF ALL RESPONSES TO PHQ QUESTIONS 1-9: 0
1. LITTLE INTEREST OR PLEASURE IN DOING THINGS: 0
6. FEELING BAD ABOUT YOURSELF - OR THAT YOU ARE A FAILURE OR HAVE LET YOURSELF OR YOUR FAMILY DOWN: 0
10. IF YOU CHECKED OFF ANY PROBLEMS, HOW DIFFICULT HAVE THESE PROBLEMS MADE IT FOR YOU TO DO YOUR WORK, TAKE CARE OF THINGS AT HOME, OR GET ALONG WITH OTHER PEOPLE: 0
5. POOR APPETITE OR OVEREATING: 0
8. MOVING OR SPEAKING SO SLOWLY THAT OTHER PEOPLE COULD HAVE NOTICED. OR THE OPPOSITE, BEING SO FIGETY OR RESTLESS THAT YOU HAVE BEEN MOVING AROUND A LOT MORE THAN USUAL: 0
4. FEELING TIRED OR HAVING LITTLE ENERGY: 0
2. FEELING DOWN, DEPRESSED OR HOPELESS: 0
3. TROUBLE FALLING OR STAYING ASLEEP: 0
9. THOUGHTS THAT YOU WOULD BE BETTER OFF DEAD, OR OF HURTING YOURSELF: 0
SUM OF ALL RESPONSES TO PHQ QUESTIONS 1-9: 0
7. TROUBLE CONCENTRATING ON THINGS, SUCH AS READING THE NEWSPAPER OR WATCHING TELEVISION: 0

## 2022-05-12 NOTE — PATIENT INSTRUCTIONS
INSTRUCTIONS  · NEXT APPOINTMENT: Please schedule check-up in 2 weeks  · PLEASE TAKE THIS FORM TO CHECK-OUT WINDOW TO SCHEDULE NEXT VISIT. · PLEASE GET BLOODWORK DRAWN TODAY ON FIRST FLOOR in 170. Take orders with you. RESULTS- most blood tests back in couple days. We will call you if any problems. If bloodwork good, you will get letter in mail or notified thru [address] (if signed up) within 2 weeks. If you do not, please call office. · INCREASE AM to 65 U. Dinner take at least 12 units up   · Will get a call if able to get Dexcom arm meter approved. · Will see if able to get urination less frequent as sugars go down.

## 2022-05-12 NOTE — TELEPHONE ENCOUNTER
Daughter of patient called stating that her Mom's INSULIN LISPRO is not supposed to go to [facility]. She said her Mom gets this through [facility]. She is currently not due for this right now.     Please Advise

## 2022-05-12 NOTE — PROGRESS NOTES
CHRONIC CONDITION FOLOW-UP       Assessment and Plan:      Diagnosis Orders   1. Uncontrolled type 2 diabetes mellitus with hyperglycemia ([address]     2. Type 2 diabetes mellitus with diabetic nephropathy, with long-term current use of insulin ([facility])  POCT glycosylated hemoglobin (Hb A1C)    insulin lispro protamine & lispro (HUMALOG MIX 75/25 KWIKPEN) (75-25) 100 UNIT per ML SUPN injection pen    Continuous Blood Gluc  (DEXCOM G6 ) GREGORIO    Continuous Blood Gluc Sensor (DEXCOM G6 SENSOR) MISC    Continuous Blood Gluc Transmit (DEXCOM G6 TRANSMITTER) MISC    Comprehensive Metabolic Panel    Lipid Panel    DISCONTINUED: Insulin Syringe-Needle U-100 ([employer] INSULIN SYRINGE) 31G X 5/16\" 1 ML MISC   3. Stage 3 chronic kidney disease, unspecified whether stage 3a or 3b CKD ([facility])  Comprehensive Metabolic Panel   4. Major depressive disorder with single episode, in full remission ([address]     5. Cerebral vascular disease     6. B12 deficiency  CBC with Auto Differential    Vitamin B12   worse DM   Depression doing well. Continue current Tx plan. Any changes marked below. INSTRUCTIONS  · NEXT APPOINTMENT: Please schedule check-up in 2 weeks  · PLEASE TAKE THIS FORM TO CHECK-OUT WINDOW TO SCHEDULE NEXT VISIT. · PLEASE GET BLOODWORK DRAWN TODAY ON FIRST FLOOR in 170. Take orders with you. RESULTS- most blood tests back in couple days. We will call you if any problems. If bloodwork good, you will get letter in mail or notified thru [address] (if signed up) within 2 weeks. If you do not, please call office. · INCREASE AM to 65 U. Dinner take at least 12 units up   · Will get a call if able to get Dexcom arm meter approved. · Will see if able to get urination less frequent as sugars go down. Subjective:      Chief Complaint   Patient presents with    Diabetes     Pt said that her blood sugar has been really high.       Peterson Lucas is an 80 y.o. female who presents for follow up    Complaints:  BS AM [address], dinner 260    CHART REVIEW   reports that she quit smoking about 20 years ago. She has a 7.50 pack-year smoking history. She has never used smokeless tobacco.  Health Maintenance Due   Topic Date Due    Shingles vaccine (2 of 3) 02/06/2017    DTaP/Tdap/Td vaccine (2 - Td or Tdap) 07/15/2020    Annual Wellness Visit (AWV)  06/09/2021    COVID-19 Vaccine (3 - Booster for Pfizer series) 12/05/2021    Lipids  12/16/2021     Current Outpatient Medications   Medication Instructions    amLODIPine (NORVASC) 10 mg, Oral, DAILY    aspirin 325 mg, Oral, DAILY    atorvastatin (LIPITOR) 40 MG tablet TAKE 1 TABLET BY MOUTH ONCE DAILY    blood glucose monitor kit and supplies Test 2 times a day & as needed for symptoms of irregular blood glucose.     Blood Glucose Monitoring Suppl (ACCU-CHEK WALTER PLUS) w/Device KIT USE TO TEST BLOOD SUGAR THREE TIMES DAILY Dx Code: E11.21    blood glucose test strips (ONETOUCH VERIO) strip USE TO TEST BLOOD SUGAR FOUR TIMES A DAY AND AS NEEDED FOR SYMPTOMS OF IRREGULAR BLOOD GLUCOSE    clopidogrel (PLAVIX) 75 MG tablet TAKE 1 TABLET BY MOUTH  DAILY    Continuous Blood Gluc  (DEXCOM G6 ) GREGORIO As needed for diabetes    Continuous Blood Gluc Sensor (DEXCOM G6 SENSOR) MISC Apply weekly    Continuous Blood Gluc Transmit (DEXCOM G6 TRANSMITTER) MISC As needed for diabetes    FLUoxetine (PROZAC) 40 MG capsule TAKE 1 CAPSULE BY MOUTH  ONCE DAILY    insulin lispro protamine & lispro (HUMALOG MIX 75/25 KWIKPEN) (75-25) 100 UNIT per ML SUPN injection pen 65 units in the morning  units in the evening 12-15    Insulin Pen Needle (TRUEPLUS PEN NEEDLES) 31G X 6 MM MISC Use to check blood sugar six times per day    Lancets MISC 1 each, Does not apply, 4 TIMES DAILY    metFORMIN (GLUCOPHAGE) 1000 MG tablet TAKE 1 TABLET BY MOUTH  TWICE DAILY WITH MEALS    pantoprazole (PROTONIX) 40 mg, Oral, DAILY    quinapril (ACCUPRIL) 40 MG tablet TAKE 1 TABLET BY MOUTH ONCE DAILY    vitamin B-12 (CYANOCOBALAMIN) 1,000 mcg, Oral, DAILY     LAST LABS  Lab Results   Component Value Date    LDLCALC 65 12/16/2020     Lab Results   Component Value Date    HDL 55 12/16/2020     Lab Results   Component Value Date    TRIG 85 12/16/2020     Lab Results   Component Value Date     11/22/2021    K 4.5 11/22/2021    CREATININE 1.6 (H) 11/22/2021     Lab Results   Component Value Date    WBC 7.1 06/21/2021    HGB 13.0 06/21/2021     06/21/2021     Lab Results   Component Value Date    ALT <5 (L) 06/21/2021    AST 11 (L) 06/21/2021    ALKPHOS 112 06/21/2021    BILITOT 0.4 06/21/2021     TSH (uIU/mL)   Date Value   09/03/2020 2.02     Lab Results   Component Value Date    GLUCOSE 151 (H) 11/22/2021     Lab Results   Component Value Date    LABA1C 10.6 05/12/2022    LABA1C 8.4 11/22/2021    LABA1C 8.4 06/21/2021     Objective:   PHYSICAL EXAM   BP (!) 148/78 (Site: Right Upper Arm, Position: Sitting, Cuff Size: Large Adult)   Pulse 55   Resp 18   Ht 5' 3\" (1.6 m)   Wt 193 lb (87.5 kg)   LMP  (LMP Unknown)   SpO2 98%   BMI 34.19 kg/m²   BP Readings from Last 5 Encounters:   05/12/22 (!) 148/78   11/22/21 130/82   06/21/21 134/68   12/10/20 138/76   09/06/20 (!) 151/75     Wt Readings from Last 5 Encounters:   05/12/22 193 lb (87.5 kg)   11/22/21 185 lb (83.9 kg)   06/21/21 183 lb 8 oz (83.2 kg)   12/10/20 183 lb (83 kg)   09/06/20 177 lb 0.5 oz (80.3 kg)      GENERAL:   · well-developed, well-nourished, alert, no distress.      LUNGS:    · Breathing unlabored  · clear to auscultation bilaterally and good air movement  CARDIOVASC:   · regular rate and rhythm  SKIN: warm and dry

## 2022-05-16 DIAGNOSIS — N18.4 TYPE 2 DIABETES MELLITUS WITH STAGE 4 CHRONIC KIDNEY DISEASE, WITH LONG-TERM CURRENT USE OF INSULIN (HCC): Primary | ICD-10-CM

## 2022-05-16 DIAGNOSIS — Z79.4 TYPE 2 DIABETES MELLITUS WITH STAGE 4 CHRONIC KIDNEY DISEASE, WITH LONG-TERM CURRENT USE OF INSULIN (HCC): Primary | ICD-10-CM

## 2022-05-16 DIAGNOSIS — E11.22 TYPE 2 DIABETES MELLITUS WITH STAGE 4 CHRONIC KIDNEY DISEASE, WITH LONG-TERM CURRENT USE OF INSULIN (HCC): Primary | ICD-10-CM

## 2022-05-18 DIAGNOSIS — I10 ESSENTIAL HYPERTENSION: ICD-10-CM

## 2022-05-18 RX ORDER — CARVEDILOL 25 MG/1
TABLET ORAL
Qty: 180 TABLET | Refills: 3 | OUTPATIENT
Start: 2022-05-18

## 2022-05-24 PROBLEM — N18.4 CKD (CHRONIC KIDNEY DISEASE) STAGE 4, GFR 15-29 ML/MIN (HCC): Status: ACTIVE | Noted: 2022-05-24

## 2022-05-26 ENCOUNTER — HOSPITAL ENCOUNTER (OUTPATIENT)
Dept: GENERAL RADIOLOGY | Age: 82
Discharge: HOME | End: 2022-05-26
Payer: MEDICARE

## 2022-05-26 ENCOUNTER — HOSPITAL ENCOUNTER (OUTPATIENT)
Age: 82
Discharge: HOME | End: 2022-05-26
Payer: MEDICARE

## 2022-05-26 ENCOUNTER — OFFICE VISIT (OUTPATIENT)
Dept: FAMILY MEDICINE CLINIC | Age: 82
End: 2022-05-26
Payer: MEDICARE

## 2022-05-26 VITALS
HEIGHT: 63 IN | OXYGEN SATURATION: 96 % | BODY MASS INDEX: 33.63 KG/M2 | SYSTOLIC BLOOD PRESSURE: 134 MMHG | DIASTOLIC BLOOD PRESSURE: 66 MMHG | HEART RATE: 88 BPM | TEMPERATURE: 98.5 F | RESPIRATION RATE: 16 BRPM | WEIGHT: 189.8 LBS

## 2022-05-26 DIAGNOSIS — E11.21 TYPE 2 DIABETES MELLITUS WITH DIABETIC NEPHROPATHY, WITH LONG-TERM CURRENT USE OF INSULIN (HCC): ICD-10-CM

## 2022-05-26 DIAGNOSIS — Z79.4 TYPE 2 DIABETES MELLITUS WITH DIABETIC NEPHROPATHY, WITH LONG-TERM CURRENT USE OF INSULIN (HCC): ICD-10-CM

## 2022-05-26 DIAGNOSIS — R06.2 WHEEZING ON EXPIRATION: ICD-10-CM

## 2022-05-26 DIAGNOSIS — E11.65 UNCONTROLLED TYPE 2 DIABETES MELLITUS WITH HYPERGLYCEMIA (HCC): Primary | ICD-10-CM

## 2022-05-26 DIAGNOSIS — J06.9 VIRAL URI: ICD-10-CM

## 2022-05-26 PROCEDURE — 3046F HEMOGLOBIN A1C LEVEL >9.0%: CPT | Performed by: FAMILY MEDICINE

## 2022-05-26 PROCEDURE — 71046 X-RAY EXAM CHEST 2 VIEWS: CPT

## 2022-05-26 PROCEDURE — 99214 OFFICE O/P EST MOD 30 MIN: CPT | Performed by: FAMILY MEDICINE

## 2022-05-26 PROCEDURE — G8427 DOCREV CUR MEDS BY ELIG CLIN: HCPCS | Performed by: FAMILY MEDICINE

## 2022-05-26 PROCEDURE — G8417 CALC BMI ABV UP PARAM F/U: HCPCS | Performed by: FAMILY MEDICINE

## 2022-05-26 PROCEDURE — 1036F TOBACCO NON-USER: CPT | Performed by: FAMILY MEDICINE

## 2022-05-26 PROCEDURE — 1090F PRES/ABSN URINE INCON ASSESS: CPT | Performed by: FAMILY MEDICINE

## 2022-05-26 PROCEDURE — 1123F ACP DISCUSS/DSCN MKR DOCD: CPT | Performed by: FAMILY MEDICINE

## 2022-05-26 PROCEDURE — G8399 PT W/DXA RESULTS DOCUMENT: HCPCS | Performed by: FAMILY MEDICINE

## 2022-05-26 RX ORDER — INSULIN LISPRO 100 [IU]/ML
INJECTION, SUSPENSION SUBCUTANEOUS
Qty: 25 PEN | Refills: 3 | Status: SHIPPED | OUTPATIENT
Start: 2022-05-26

## 2022-05-26 NOTE — PATIENT INSTRUCTIONS
INSTRUCTIONS  NEXT APPOINTMENT: Please schedule check-up in 6 weeks   · PLEASE TAKE THIS FORM TO CHECK-OUT WINDOW TO SCHEDULE NEXT VISIT. Get x-ray. [facility] walk in to [facility] to get the x-ray. No appointment needed. May take Mucinex (guaifenisen) and Robitussin DM (guafenisen, dextromethorphan) for cough and congestion. May also try Vicks Vaporub. · AVOID decongestants like phenylephrine and pseudoephedrine. AVOID Afrin. · For allergies, patient may take OTC antihistamines such as Claritin/Allovert/loratidine or Zyrtec/certrizine or Allegra/fexofenadine. If allergies severe, may also take OTC Benadryl/diphenhydramine. · Call if not better in a week.  Start Januvia one pill a day. · INCREASE AM insulin to 75 units.

## 2022-05-26 NOTE — PROGRESS NOTES
CHRONIC CONDITION FOLOW-UP       Assessment and Plan:      Diagnosis Orders   1. Uncontrolled type 2 diabetes mellitus with hyperglycemia ([facility])  Add Januvia and increase insulin   2. Type 2 diabetes mellitus with diabetic nephropathy, with long-term current use of insulin ([facility])  SITagliptin (JANUVIA) 25 MG tablet    insulin lispro protamine & lispro (HUMALOG MIX 75/25 KWIKPEN) (75-25) 100 UNIT per ML SUPN injection pen   3. Viral URI  OTC for now   4. Wheezing on expiration  XR CHEST (2 VW) to rule out pneumonia      Continue current Tx plan. Any changes marked below. INSTRUCTIONS  NEXT APPOINTMENT: Please schedule check-up in 6 weeks   · PLEASE TAKE THIS FORM TO CHECK-OUT WINDOW TO SCHEDULE NEXT VISIT. Get x-ray. [facility] to get the x-ray. No appointment needed. May take Mucinex (guaifenisen) and Robitussin DM (guafenisen, dextromethorphan) for cough and congestion. May also try Vicks Vaporub. · AVOID decongestants like phenylephrine and pseudoephedrine. AVOID Afrin. · For allergies, patient may take OTC antihistamines such as Claritin/Allovert/loratidine or Zyrtec/certrizine or Allegra/fexofenadine. If allergies severe, may also take OTC Benadryl/diphenhydramine. · Call if not better in a week.  Start Januvia one pill a day. · INCREASE AM insulin to 75 units. ·     Subjective:      Chief Complaint   Patient presents with    Diabetes     f/u from 2 weeks ago. sugar was 171 this am. saw dr Rosibel Pritchard on tuesday. he is recommending another med like [facility] for better control     Rashawn Montenegro is an 80 y.o. female who presents for follow up    Complaints:    Cough and head congestion few day. No fever, no wheeze    Loosing voice.      CHART REVIEW   reports that she quit smoking about 20 years ago. She has a 7.50 pack-year smoking history.  She has never used smokeless tobacco.  Health Maintenance Due   Topic Date Due    Shingles vaccine (2 of 3) 02/06/2017    DTaP/Tdap/Td vaccine (2 - Td or Tdap) 07/15/2020    Annual Wellness Visit (AWV)  06/09/2021    COVID-19 Vaccine (3 - Booster for Pfizer series) 12/05/2021     Current Outpatient Medications   Medication Instructions    amLODIPine (NORVASC) 10 mg, Oral, DAILY    aspirin 325 mg, Oral, DAILY    atorvastatin (LIPITOR) 40 MG tablet TAKE 1 TABLET BY MOUTH ONCE DAILY    blood glucose monitor kit and supplies Test 2 times a day & as needed for symptoms of irregular blood glucose.     Blood Glucose Monitoring Suppl (ACCU-CHEK WALTER PLUS) w/Device KIT USE TO TEST BLOOD SUGAR THREE TIMES DAILY Dx Code: E11.21    blood glucose test strips (ONETOUCH VERIO) strip USE TO TEST BLOOD SUGAR FOUR TIMES A DAY AND AS NEEDED FOR SYMPTOMS OF IRREGULAR BLOOD GLUCOSE    clopidogrel (PLAVIX) 75 MG tablet TAKE 1 TABLET BY MOUTH  DAILY    Continuous Blood Gluc  (DEXCOM G6 ) GREGORIO As needed for diabetes    Continuous Blood Gluc Sensor (DEXCOM G6 SENSOR) MISC Apply weekly    Continuous Blood Gluc Transmit (DEXCOM G6 TRANSMITTER) MISC As needed for diabetes    FLUoxetine (PROZAC) 40 MG capsule TAKE 1 CAPSULE BY MOUTH  ONCE DAILY    furosemide (LASIX) 40 mg, Oral, DAILY    insulin lispro protamine & lispro (HUMALOG MIX 75/25 KWIKPEN) (75-25) 100 UNIT per ML SUPN injection pen 75 units in the morning units in the evening 12-15    Insulin Pen Needle (TRUEPLUS PEN NEEDLES) 31G X 6 MM MISC Use to check blood sugar six times per day    Lancets MISC 1 each, Does not apply, 4 TIMES DAILY    pantoprazole (PROTONIX) 40 mg, Oral, DAILY    quinapril (ACCUPRIL) 40 MG tablet TAKE 1 TABLET BY MOUTH ONCE DAILY    SITagliptin (JANUVIA) 25 mg, Oral, DAILY    vitamin B-12 (CYANOCOBALAMIN) 1,000 mcg, Oral, DAILY       LAST LABS  Lab Results   Component Value Date    LDLCALC 74 05/12/2022     Lab Results   Component Value Date    HDL 68 (H) 05/12/2022     Lab Results   Component Value Date    TRIG 111 05/12/2022     Lab Results Component Value Date     05/12/2022    K 5.3 (H) 05/12/2022    CREATININE 1.8 (H) 05/12/2022     Lab Results   Component Value Date    WBC 6.1 05/12/2022    HGB 12.8 05/12/2022     05/12/2022     Lab Results   Component Value Date    ALT <5 (L) 05/12/2022    AST 11 (L) 05/12/2022    ALKPHOS 120 05/12/2022    BILITOT 0.5 05/12/2022     TSH (uIU/mL)   Date Value   09/03/2020 2.02     Lab Results   Component Value Date    GLUCOSE 281 (H) 05/12/2022     Lab Results   Component Value Date    LABA1C 10.6 05/12/2022    LABA1C 8.4 11/22/2021    LABA1C 8.4 06/21/2021     Objective:   PHYSICAL EXAM   /66 (Site: Right Upper Arm, Position: Sitting, Cuff Size: Large Adult)   Pulse 88   Temp 98.5 °F (36.9 °C) (Oral)   Resp 16   Ht 5' 3\" (1.6 m)   Wt 189 lb 12.8 oz (86.1 kg)   LMP  (LMP Unknown)   SpO2 96%   BMI 33.62 kg/m²   BP Readings from Last 5 Encounters:   05/26/22 134/66   05/24/22 (!) 184/69   05/12/22 (!) 148/78   11/22/21 130/82   06/21/21 134/68     Wt Readings from Last 5 Encounters:   05/26/22 189 lb 12.8 oz (86.1 kg)   05/24/22 190 lb 3.2 oz (86.3 kg)   05/12/22 193 lb (87.5 kg)   11/22/21 185 lb (83.9 kg)   06/21/21 183 lb 8 oz (83.2 kg)      GENERAL:   · well-developed, obese, frequent coughing  LUNGS:    · Breathing unlabored, coughing  ·  Rhonchi GUILLERMINA, fair air movement  CARDIOVASC:   · regular rate and rhythm  SKIN: warm and dry

## 2022-05-31 ENCOUNTER — TELEPHONE (OUTPATIENT)
Dept: FAMILY MEDICINE CLINIC | Age: 82
End: 2022-05-31

## 2022-05-31 DIAGNOSIS — J20.9 ACUTE BRONCHITIS, UNSPECIFIED ORGANISM: Primary | ICD-10-CM

## 2022-05-31 NOTE — TELEPHONE ENCOUNTER
Productive cough, fever, wheezing? If not, try claritin 10 mg twice a day for a week WITH flonase nasal spray once a day for allergies. If yes, back to me to add antibiotic s well as above.

## 2022-06-01 ENCOUNTER — TELEPHONE (OUTPATIENT)
Dept: FAMILY MEDICINE CLINIC | Age: 82
End: 2022-06-01

## 2022-06-01 RX ORDER — DEXTROMETHORPHAN HYDROBROMIDE AND PROMETHAZINE HYDROCHLORIDE 15; 6.25 MG/5ML; MG/5ML
5 SYRUP ORAL NIGHTLY PRN
Qty: 120 ML | Refills: 0 | Status: SHIPPED | OUTPATIENT
Start: 2022-06-01 | End: 2022-06-08

## 2022-06-01 RX ORDER — AZITHROMYCIN 250 MG/1
TABLET, FILM COATED ORAL
Qty: 1 PACKET | Refills: 0 | Status: SHIPPED | OUTPATIENT
Start: 2022-06-01 | End: 2022-06-06

## 2022-06-01 NOTE — TELEPHONE ENCOUNTER
Called dop she wants to know if something can be sent in for her cough pt isn't sleeping well and is exhausted because she can't stop coughing

## 2022-06-01 NOTE — TELEPHONE ENCOUNTER
DOP called in pt was seen last week on 05/12/2022 pt still has a coughing day and night       Please advise

## 2022-06-01 NOTE — TELEPHONE ENCOUNTER
Please notify patient that prescription was e-scribed to pharmacy for antibiotic. Is breathing bad and needing inhaler?

## 2022-06-01 NOTE — TELEPHONE ENCOUNTER
Called and spoke with dop she states she has tried claratin and she is still coughing a lot and wheezing.   She uses [facility] on [address]

## 2022-06-02 DIAGNOSIS — I65.21 STENOSIS OF RIGHT CAROTID ARTERY: ICD-10-CM

## 2022-06-02 DIAGNOSIS — I10 ESSENTIAL HYPERTENSION: ICD-10-CM

## 2022-06-02 DIAGNOSIS — I67.9 CEREBRAL VASCULAR DISEASE: ICD-10-CM

## 2022-06-03 RX ORDER — CLOPIDOGREL BISULFATE 75 MG/1
TABLET ORAL
Qty: 30 TABLET | Refills: 11 | Status: SHIPPED | OUTPATIENT
Start: 2022-06-03

## 2022-06-03 RX ORDER — QUINAPRIL 40 MG/1
TABLET ORAL
Qty: 30 TABLET | Refills: 11 | Status: SHIPPED | OUTPATIENT
Start: 2022-06-03

## 2022-06-15 ENCOUNTER — HOSPITAL ENCOUNTER (OUTPATIENT)
Dept: ULTRASOUND IMAGING | Age: 82
Discharge: HOME | End: 2022-06-15
Payer: MEDICARE

## 2022-06-15 DIAGNOSIS — I10 ESSENTIAL HYPERTENSION: ICD-10-CM

## 2022-06-15 DIAGNOSIS — N18.4 CKD (CHRONIC KIDNEY DISEASE) STAGE 4, GFR 15-29 ML/MIN (HCC): ICD-10-CM

## 2022-06-15 PROCEDURE — 76770 US EXAM ABDO BACK WALL COMP: CPT

## 2022-06-24 ENCOUNTER — HOSPITAL ENCOUNTER (OUTPATIENT)
Age: 82
Discharge: HOME | End: 2022-06-24
Payer: MEDICARE

## 2022-06-24 DIAGNOSIS — N18.4 CKD (CHRONIC KIDNEY DISEASE) STAGE 4, GFR 15-29 ML/MIN (HCC): ICD-10-CM

## 2022-06-24 DIAGNOSIS — I10 ESSENTIAL HYPERTENSION: ICD-10-CM

## 2022-06-24 DIAGNOSIS — E53.8 B12 DEFICIENCY: ICD-10-CM

## 2022-06-24 LAB
ALBUMIN SERPL-MCNC: 4.3 G/DL (ref 3.4–5)
BACTERIA: NORMAL /HPF
BILIRUBIN URINE: NEGATIVE
BLOOD, URINE: NEGATIVE
CLARITY: CLEAR
COLOR: YELLOW
CREATININE URINE: 43.4 MG/DL (ref 28–259)
EPITHELIAL CELLS, UA: 0 /HPF (ref 0–5)
GLUCOSE URINE: NEGATIVE MG/DL
HYALINE CASTS: 0 /LPF (ref 0–8)
KETONES, URINE: NEGATIVE MG/DL
LEUKOCYTE ESTERASE, URINE: NEGATIVE
MICROSCOPIC EXAMINATION: NORMAL
NITRITE, URINE: NEGATIVE
PARATHYROID HORMONE INTACT: 101.5 PG/ML (ref 14–72)
PH UA: 6 (ref 5–8)
PROTEIN PROTEIN: 10 MG/DL
PROTEIN UA: NEGATIVE MG/DL
PROTEIN/CREAT RATIO: 0.2 MG/DL
RBC UA: 0 /HPF (ref 0–4)
SPECIFIC GRAVITY UA: 1.01 (ref 1–1.03)
URINE TYPE: NORMAL
UROBILINOGEN, URINE: 0.2 E.U./DL
VITAMIN D 25-HYDROXY: 43.2 NG/ML
WBC UA: 1 /HPF (ref 0–5)

## 2022-06-24 PROCEDURE — 36415 COLL VENOUS BLD VENIPUNCTURE: CPT

## 2022-06-24 PROCEDURE — 84165 PROTEIN E-PHORESIS SERUM: CPT

## 2022-06-24 PROCEDURE — 81001 URINALYSIS AUTO W/SCOPE: CPT

## 2022-06-24 PROCEDURE — 84155 ASSAY OF PROTEIN SERUM: CPT

## 2022-06-24 PROCEDURE — 82306 VITAMIN D 25 HYDROXY: CPT

## 2022-06-24 PROCEDURE — 82570 ASSAY OF URINE CREATININE: CPT

## 2022-06-24 PROCEDURE — 82040 ASSAY OF SERUM ALBUMIN: CPT

## 2022-06-24 PROCEDURE — 83970 ASSAY OF PARATHORMONE: CPT

## 2022-06-24 PROCEDURE — 84156 ASSAY OF PROTEIN URINE: CPT

## 2022-06-24 RX ORDER — LANOLIN ALCOHOL/MO/W.PET/CERES
CREAM (GRAM) TOPICAL
Qty: 30 TABLET | Refills: 5 | Status: SHIPPED | OUTPATIENT
Start: 2022-06-24

## 2022-06-27 LAB
ALBUMIN SERPL-MCNC: 3.5 G/DL (ref 3.1–4.9)
ALPHA-1-GLOBULIN: 0.3 G/DL (ref 0.2–0.4)
ALPHA-2-GLOBULIN: 1.1 G/DL (ref 0.4–1.1)
BETA GLOBULIN: 1.5 G/DL (ref 0.9–1.6)
GAMMA GLOBULIN: 1 G/DL (ref 0.6–1.8)
SPE/IFE INTERPRETATION: NORMAL
TOTAL PROTEIN: 7.4 G/DL (ref 6.4–8.2)

## 2022-08-10 ENCOUNTER — TELEPHONE (OUTPATIENT)
Dept: FAMILY MEDICINE CLINIC | Age: 82
End: 2022-08-10

## 2022-08-10 DIAGNOSIS — E11.21 TYPE 2 DIABETES MELLITUS WITH DIABETIC NEPHROPATHY, WITH LONG-TERM CURRENT USE OF INSULIN (HCC): ICD-10-CM

## 2022-08-10 DIAGNOSIS — Z79.4 TYPE 2 DIABETES MELLITUS WITH DIABETIC NEPHROPATHY, WITH LONG-TERM CURRENT USE OF INSULIN (HCC): ICD-10-CM

## 2022-08-10 RX ORDER — INSULIN LISPRO 100 [IU]/ML
INJECTION, SUSPENSION SUBCUTANEOUS
Qty: 25 PEN | Refills: 3 | Status: CANCELLED | OUTPATIENT
Start: 2022-08-10

## 2022-08-10 NOTE — TELEPHONE ENCOUNTER
DOP calling stating that the humalog was supposed to go to [facility] after 5.26.2022 appt but was sent to her pharm mail order. DOP stated that is was not sent to [facility] last time but would like it to be sent there.      DOP stated pt only hs a few days left    Please Advise if Humalog can be sent to [facility]   GALLO can be reached at [phone]

## 2022-08-26 DIAGNOSIS — E11.21 TYPE 2 DIABETES MELLITUS WITH DIABETIC NEPHROPATHY, WITH LONG-TERM CURRENT USE OF INSULIN (HCC): ICD-10-CM

## 2022-08-26 DIAGNOSIS — Z79.4 TYPE 2 DIABETES MELLITUS WITH DIABETIC NEPHROPATHY, WITH LONG-TERM CURRENT USE OF INSULIN (HCC): ICD-10-CM

## 2022-08-26 RX ORDER — SITAGLIPTIN 25 MG/1
TABLET, FILM COATED ORAL
Qty: 30 TABLET | Refills: 0 | Status: SHIPPED | OUTPATIENT
Start: 2022-08-26 | End: 2022-09-26

## 2022-08-31 DIAGNOSIS — N18.4 CKD (CHRONIC KIDNEY DISEASE), STAGE IV (HCC): ICD-10-CM

## 2022-08-31 LAB
ANION GAP SERPL CALCULATED.3IONS-SCNC: 13 MMOL/L (ref 3–16)
BUN BLDV-MCNC: 36 MG/DL (ref 7–20)
CALCIUM SERPL-MCNC: 9.9 MG/DL (ref 8.3–10.6)
CHLORIDE BLD-SCNC: 106 MMOL/L (ref 99–110)
CO2: 26 MMOL/L (ref 21–32)
CREAT SERPL-MCNC: 2.4 MG/DL (ref 0.6–1.2)
CREATININE URINE: 85.3 MG/DL (ref 28–259)
GFR AFRICAN AMERICAN: 23
GFR NON-AFRICAN AMERICAN: 19
GLUCOSE BLD-MCNC: 170 MG/DL (ref 70–99)
POTASSIUM SERPL-SCNC: 4.6 MMOL/L (ref 3.5–5.1)
PROTEIN PROTEIN: 9 MG/DL
PROTEIN/CREAT RATIO: 0.1 MG/DL
SODIUM BLD-SCNC: 145 MMOL/L (ref 136–145)

## 2022-09-22 ENCOUNTER — OFFICE VISIT (OUTPATIENT)
Dept: FAMILY MEDICINE CLINIC | Age: 82
End: 2022-09-22
Payer: MEDICARE

## 2022-09-22 VITALS
SYSTOLIC BLOOD PRESSURE: 136 MMHG | HEART RATE: 76 BPM | BODY MASS INDEX: 34.91 KG/M2 | HEIGHT: 63 IN | OXYGEN SATURATION: 95 % | DIASTOLIC BLOOD PRESSURE: 66 MMHG | WEIGHT: 197 LBS

## 2022-09-22 DIAGNOSIS — Z79.4 TYPE 2 DIABETES MELLITUS WITH DIABETIC NEPHROPATHY, WITH LONG-TERM CURRENT USE OF INSULIN (HCC): ICD-10-CM

## 2022-09-22 DIAGNOSIS — E11.21 TYPE 2 DIABETES MELLITUS WITH DIABETIC NEPHROPATHY, WITH LONG-TERM CURRENT USE OF INSULIN (HCC): ICD-10-CM

## 2022-09-22 DIAGNOSIS — E11.65 UNCONTROLLED TYPE 2 DIABETES MELLITUS WITH HYPERGLYCEMIA (HCC): ICD-10-CM

## 2022-09-22 DIAGNOSIS — Z00.00 MEDICARE ANNUAL WELLNESS VISIT, SUBSEQUENT: Primary | ICD-10-CM

## 2022-09-22 DIAGNOSIS — Z23 NEED FOR INFLUENZA VACCINATION: ICD-10-CM

## 2022-09-22 LAB — HBA1C MFR BLD: 8.8 %

## 2022-09-22 PROCEDURE — 1036F TOBACCO NON-USER: CPT | Performed by: FAMILY MEDICINE

## 2022-09-22 PROCEDURE — 1123F ACP DISCUSS/DSCN MKR DOCD: CPT | Performed by: FAMILY MEDICINE

## 2022-09-22 PROCEDURE — 99214 OFFICE O/P EST MOD 30 MIN: CPT | Performed by: FAMILY MEDICINE

## 2022-09-22 PROCEDURE — G8417 CALC BMI ABV UP PARAM F/U: HCPCS | Performed by: FAMILY MEDICINE

## 2022-09-22 PROCEDURE — G8399 PT W/DXA RESULTS DOCUMENT: HCPCS | Performed by: FAMILY MEDICINE

## 2022-09-22 PROCEDURE — 83036 HEMOGLOBIN GLYCOSYLATED A1C: CPT | Performed by: FAMILY MEDICINE

## 2022-09-22 PROCEDURE — G8427 DOCREV CUR MEDS BY ELIG CLIN: HCPCS | Performed by: FAMILY MEDICINE

## 2022-09-22 PROCEDURE — 3052F HG A1C>EQUAL 8.0%<EQUAL 9.0%: CPT | Performed by: FAMILY MEDICINE

## 2022-09-22 PROCEDURE — G0439 PPPS, SUBSEQ VISIT: HCPCS | Performed by: FAMILY MEDICINE

## 2022-09-22 PROCEDURE — 1090F PRES/ABSN URINE INCON ASSESS: CPT | Performed by: FAMILY MEDICINE

## 2022-09-22 PROCEDURE — 90694 VACC AIIV4 NO PRSRV 0.5ML IM: CPT | Performed by: FAMILY MEDICINE

## 2022-09-22 PROCEDURE — G0008 ADMIN INFLUENZA VIRUS VAC: HCPCS | Performed by: FAMILY MEDICINE

## 2022-09-22 RX ORDER — BLOOD-GLUCOSE SENSOR
EACH MISCELLANEOUS
Qty: 4 EACH | Refills: 11 | Status: SHIPPED | OUTPATIENT
Start: 2022-09-22

## 2022-09-22 RX ORDER — BLOOD-GLUCOSE TRANSMITTER
EACH MISCELLANEOUS
Qty: 1 EACH | Refills: 0 | Status: SHIPPED | OUTPATIENT
Start: 2022-09-22

## 2022-09-22 RX ORDER — BLOOD-GLUCOSE,RECEIVER,CONT
EACH MISCELLANEOUS
Qty: 1 EACH | Refills: 0 | Status: SHIPPED | OUTPATIENT
Start: 2022-09-22

## 2022-09-22 SDOH — ECONOMIC STABILITY: FOOD INSECURITY: WITHIN THE PAST 12 MONTHS, THE FOOD YOU BOUGHT JUST DIDN'T LAST AND YOU DIDN'T HAVE MONEY TO GET MORE.: NEVER TRUE

## 2022-09-22 SDOH — ECONOMIC STABILITY: FOOD INSECURITY: WITHIN THE PAST 12 MONTHS, YOU WORRIED THAT YOUR FOOD WOULD RUN OUT BEFORE YOU GOT MONEY TO BUY MORE.: NEVER TRUE

## 2022-09-22 ASSESSMENT — PATIENT HEALTH QUESTIONNAIRE - PHQ9
6. FEELING BAD ABOUT YOURSELF - OR THAT YOU ARE A FAILURE OR HAVE LET YOURSELF OR YOUR FAMILY DOWN: 0
SUM OF ALL RESPONSES TO PHQ QUESTIONS 1-9: 1
8. MOVING OR SPEAKING SO SLOWLY THAT OTHER PEOPLE COULD HAVE NOTICED. OR THE OPPOSITE, BEING SO FIGETY OR RESTLESS THAT YOU HAVE BEEN MOVING AROUND A LOT MORE THAN USUAL: 0
5. POOR APPETITE OR OVEREATING: 0
SUM OF ALL RESPONSES TO PHQ QUESTIONS 1-9: 1
2. FEELING DOWN, DEPRESSED OR HOPELESS: 1
10. IF YOU CHECKED OFF ANY PROBLEMS, HOW DIFFICULT HAVE THESE PROBLEMS MADE IT FOR YOU TO DO YOUR WORK, TAKE CARE OF THINGS AT HOME, OR GET ALONG WITH OTHER PEOPLE: 1
1. LITTLE INTEREST OR PLEASURE IN DOING THINGS: 0
SUM OF ALL RESPONSES TO PHQ QUESTIONS 1-9: 1
SUM OF ALL RESPONSES TO PHQ9 QUESTIONS 1 & 2: 1
3. TROUBLE FALLING OR STAYING ASLEEP: 0
SUM OF ALL RESPONSES TO PHQ QUESTIONS 1-9: 1
4. FEELING TIRED OR HAVING LITTLE ENERGY: 0
7. TROUBLE CONCENTRATING ON THINGS, SUCH AS READING THE NEWSPAPER OR WATCHING TELEVISION: 0
9. THOUGHTS THAT YOU WOULD BE BETTER OFF DEAD, OR OF HURTING YOURSELF: 0

## 2022-09-22 ASSESSMENT — LIFESTYLE VARIABLES
HOW MANY STANDARD DRINKS CONTAINING ALCOHOL DO YOU HAVE ON A TYPICAL DAY: PATIENT DOES NOT DRINK
HOW OFTEN DO YOU HAVE A DRINK CONTAINING ALCOHOL: NEVER

## 2022-09-22 ASSESSMENT — SOCIAL DETERMINANTS OF HEALTH (SDOH): HOW HARD IS IT FOR YOU TO PAY FOR THE VERY BASICS LIKE FOOD, HOUSING, MEDICAL CARE, AND HEATING?: NOT VERY HARD

## 2022-09-22 NOTE — PROGRESS NOTES
Medicare Annual Wellness Visit  Name: Fatmata Putnam  YOB: 1940  Age: 80 y.o. Sex: female  MRN: [id number]     Date of Service:  9/22/2022    Chief Complaint:   Fatmata Putnam is a 80 y.o. female who presents for Medicare Annual Wellness Visit and check-up for:  1. Medicare annual wellness visit, subsequent    2. Uncontrolled type 2 diabetes mellitus with hyperglycemia ([facility]    3. Type 2 diabetes mellitus with diabetic nephropathy, with long-term current use of insulin ([facility].)    4. Need for influenza vaccination      HPI    Chief Complaint   Patient presents with    Medicare AWV     AWV   Complaints:   A1c 8.8 today. On 75/25. Taking 70-75 U before breakfast   Gets about 3 low sugars early afternoon a month    Review of Systems - unremarable except as noted above    HISTORY:  Patient's medications, allergies, past medical, and social histories were reviewed and updated as appropriate. CHART REVIEW  Health Maintenance   Topic Date Due    Shingles vaccine (1 of 2) 02/06/2017    DTaP/Tdap/Td vaccine (2 - Td or Tdap) 07/15/2020    COVID-19 Vaccine (3 - Booster for Pfizer series) 12/05/2021    Flu vaccine (1) 09/01/2022    Lipids  05/12/2023    Depression Monitoring  05/12/2023    Annual Wellness Visit (AWV)  09/23/2023    DEXA (modify frequency per FRAX score)  Completed    Pneumococcal 65+ years Vaccine  Completed    Hepatitis A vaccine  Aged Out    Hib vaccine  Aged Out    Meningococcal (ACWY) vaccine  Aged Out     The ASCVD Risk score (Flor Hudson, et al., 2013) failed to calculate for the following reasons: The 2013 ASCVD risk score is only valid for ages 36 to 78  Current Outpatient Medications   Medication Instructions    amLODIPine (NORVASC) 10 mg, Oral, DAILY    aspirin 325 mg, Oral, DAILY    atorvastatin (LIPITOR) 40 MG tablet TAKE 1 TABLET BY MOUTH ONCE DAILY    blood glucose monitor kit and supplies Test 2 times a day & as needed for symptoms of irregular blood glucose.     Blood Glucose Monitoring Suppl (ACCU-CHEK WALTER PLUS) w/Device KIT USE TO TEST BLOOD SUGAR THREE TIMES DAILY Dx Code: E11.21    blood glucose test strips (ONETOUCH VERIO) strip USE TO TEST BLOOD SUGAR FOUR TIMES A DAY AND AS NEEDED FOR SYMPTOMS OF IRREGULAR BLOOD GLUCOSE    clopidogrel (PLAVIX) 75 MG tablet TAKE 1 TABLET BY MOUTH  DAILY    Continuous Blood Gluc  ([address]) GREGORIO As needed for diabetes    Continuous Blood Gluc  ([address]) [address] As needed for diabetes    Continuous Blood Gluc Sensor (DEXCOM G6 SENSOR) MISC Apply weekly    Continuous Blood Gluc Sensor (DEXCOM G6 SENSOR) MISC Apply weekly    Continuous Blood Gluc Transmit (DEXCOM G6 TRANSMITTER) MISC As needed for diabetes    Continuous Blood Gluc Transmit (DEXCOM G6 TRANSMITTER) MISC As needed for diabetes    FLUoxetine (PROZAC) 40 MG capsule TAKE 1 CAPSULE BY MOUTH  ONCE DAILY    furosemide (LASIX) 40 mg, Oral, 2 TIMES DAILY    insulin lispro protamine & lispro (HUMALOG MIX 75/25 KWIKPEN) (75-25) 100 UNIT per ML SUPN injection pen 75 units in the morning units in the evening 12-15    Insulin Pen Needle (TRUEPLUS PEN NEEDLES) 31G X 6 MM MISC Use to check blood sugar six times per day    JANUVIA 25 MG tablet TAKE 1 TABLET BY MOUTH EVERY DAY    Lancets MISC 1 each, Does not apply, 4 TIMES DAILY    pantoprazole (PROTONIX) 40 mg, Oral, DAILY    quinapril (ACCUPRIL) 40 MG tablet TAKE 1 TABLET BY MOUTH ONCE DAILY    vitamin B-12 (CYANOCOBALAMIN) 1000 MCG tablet TAKE 1 TABLET BY MOUTH EVERY DAY      Family History   Problem Relation Age of Onset    Stroke Father     Mental Illness Brother         schizophrenia    Cancer Brother         Lung Cancer     Social History     Tobacco Use    Smoking status: Former     Packs/day: 0.50     Years: 15.00     Pack years: 7.50     Types: Cigarettes     Quit date: 2002     Years since quittin.7    Smokeless tobacco: Never    Tobacco comments:     congrats   Vaping Use    Vaping Use: Never used   Substance Use Topics    Alcohol use: No    Drug use: No      Immunization History   Administered Date(s) Administered    COVID-19, PFIZER PURPLE top, DILUTE for use, (age 15 y+), 30mcg/0.3mL 06/13/2021, 07/05/2021    Influenza Vaccine, unspecified formulation 11/14/2011, 11/28/2012, 11/13/2013, 11/12/2014, 10/02/2015, 11/21/2016    Influenza Virus Vaccine 11/11/2008    Influenza Whole 11/11/2008    Influenza, FLUAD, (age 72 y+), Adjuvanted, 0.5mL 12/10/2020    Influenza, High Dose (Fluzone 65 yrs and older) 11/14/2011, 11/28/2012, 11/13/2013, 11/12/2014, 10/02/2015, 11/21/2016, 10/19/2017, 11/12/2018    Influenza, Triv, inactivated, subunit, adjuvanted, IM (Fluad 65 yrs and older) 12/06/2019    Pneumococcal Conjugate 13-valent (Arwfacf38) 10/02/2015    Pneumococcal Polysaccharide ([id number]) 04/07/2006    Td, unspecified formulation 07/15/2010    Tdap (Boostrix, Adacel) 07/15/2010    Zoster Live (Zostavax) 12/12/1960, 12/12/2016     LAST LABS  Cholesterol, Total   Date Value Ref Range Status   05/12/2022 164 0 - 199 mg/dL Final     LDL Calculated   Date Value Ref Range Status   05/12/2022 74 <100 mg/dL Final     HDL   Date Value Ref Range Status   05/12/2022 68 (H) 40 - 60 mg/dL Final   11/14/2011 65 (H) 40 - 60 mg/dl Final     Triglycerides   Date Value Ref Range Status   05/12/2022 111 0 - 150 mg/dL Final     Lab Results   Component Value Date    GLUCOSE 170 (H) 08/31/2022     Lab Results   Component Value Date     08/31/2022    K 4.6 08/31/2022    CREATININE 2.4 (H) 08/31/2022     Lab Results   Component Value Date    WBC 6.1 05/12/2022    HGB 12.8 05/12/2022    HCT 39.5 05/12/2022    MCV 86.3 05/12/2022     05/12/2022     Lab Results   Component Value Date    ALT <5 (L) 05/12/2022    AST 11 (L) 05/12/2022    ALKPHOS 120 05/12/2022    BILITOT 0.5 05/12/2022     TSH (uIU/mL)   Date Value   09/03/2020 2.02     Lab Results   Component Value Date    LABA1C 8.8 09/22/2022      [facility] (Including outside providers/suppliers regularly involved in providing care):   Patient Care Team:  Tricia Nelson MD as PCP - Maycol Serrano MD as PCP - [facility]  Sylvain Hoyos MD as Surgeon (Orthopedic Surgery)    The following problems were reviewed today and where indicated follow up appointments were made and/or referrals ordered.     Positive Risk Factor Screenings with Interventions:     Fall Risk:  Do you feel unsteady or are you worried about falling? : no  2 or more falls in past year?: no  Fall with injury in past year?: (!) yes (fell off ladder, no real injury)   Fall Risk Interventions:    Home safety tips provided       General Health and ACP:  General  In general, how would you say your health is?: Good  In the past 7 days, have you experienced any of the following: New or Increased Pain, New or Increased Fatigue, Loneliness, Social Isolation, Stress or Anger?: (!) Yes  Select all that apply: (!) Loneliness, Social Isolation, Stress  Do you get the social and emotional support that you need?: Yes  Do you have a Living Will?: (!) No    Advance Directives       Power of  Living Will ACP-Advance Directive ACP-Power of     Not on File Not on File Not on File Not on File        General Health Risk Interventions:  Loneliness: referred to [place] on Aging  Social isolation: referred to [employer] on Aging  Stress: patient declines any further evaluation/treatment for this issue    Health Habits/Nutrition:  Physical Activity: Inactive    Days of Exercise per Week: 0 days    Minutes of Exercise per Session: 0 min     Have you lost any weight without trying in the past 3 months?: No  Body mass index: (!) 34.89  Have you seen the dentist within the past year?: N/A - wear dentures  Health Habits/Nutrition Interventions:  Nutritional issues:  patient is not ready to address his/her nutritional/weight issues at this time    Hearing/Vision:  Do you or your family notice any trouble with your hearing that hasn't been managed with hearing aids?: No  Do you have difficulty driving, watching TV, or doing any of your daily activities because of your eyesight?: No  Have you had an eye exam within the past year?: (!) No  No results found. Hearing/Vision Interventions:  Vision concerns:  patient encouraged to make appointment with his/her eye specialist      Safety:  Do you have working smoke detectors?: (!) No  Do you have any tripping hazards - loose or unsecured carpets or rugs?: No  Do you have any tripping hazards - clutter in doorways, halls, or stairs?: No  Do you have either shower bars, grab bars, non-slip mats or non-slip surfaces in your shower or bathtub?: Yes  Do all of your stairways have a railing or banister?: Yes  Do you always fasten your seatbelt when you are in a car?: Yes  Safety Interventions:  Home safety tips provided      ADLs:  In the past 7 days, did you need help from others to perform any of the following everyday activities: Eating, dressing, grooming, bathing, toileting, or walking/balance?: No  In the past 7 days, did you need help from others to take care of any of the following: Laundry, housekeeping, banking/finances, shopping, telephone use, food preparation, transportation, or taking medications?: (!) Yes  Select all that apply: (!) Transportation (doesn't drive)  ADL Interventions:  Referred to [employer] on Aging      Current Health Maintenance Status  Recommendations for [email or web address] Due: see orders.   Recommended screening schedule for the next 5-10 years is provided to the patient in written form: see Patient Instructions/AVS.    PHYSICAL EXAM:  VITALS:  /66 (Site: Right Upper Arm, Position: Sitting, Cuff Size: Large Adult)   Pulse 76   Ht 5' 3\" (1.6 m)   Wt 197 lb (89.4 kg)   LMP  (LMP Unknown)   SpO2 95%   BMI 34.90 kg/m²   BP Readings from Last 5 Encounters:   09/22/22 136/66   08/29/22 130/82   06/28/22 133/74   05/26/22 134/66 05/24/22 (!) 184/69     Wt Readings from Last 5 Encounters:   09/22/22 197 lb (89.4 kg)   08/29/22 195 lb (88.5 kg)   06/28/22 192 lb 12.8 oz (87.5 kg)   05/26/22 189 lb 12.8 oz (86.1 kg)   05/24/22 190 lb 3.2 oz (86.3 kg)   Body mass index is 34.9 kg/m². GENERAL: well-developed, well-nourished, alert, no distress, calm   EYES: negative findings: lids and lashes normal and conjunctivae and sclerae normal  ENT: normal TM's and external ear canals both ears  External nose and ears appear normal  Pharynx: normal. Exudates: None  Lips, mucosa, and tongue normal  Hearing grossly normal.    NECK: No adenopathy, supple, symmetrical, trachea midline  Thyroid not enlarged, symmetric, no tenderness/mass/nodules  no cervical nodes, no supraclavicular nodes  LUNGS:  Breathing unlabored  clear to auscultation bilaterally and good air movement  CARDIOVASC: regular rate and rhythm, S1, S2 normal  LEGS:  Lower extremity edema: none    No carotid bruits  ABDOMEN: Soft, non-tender, no masses  No hepatosplenomegaly  No hernias noted. Exam limited by body habitus and N/A  SKIN: warm and dry  No rashes or suspicious lesions  PSYCH:  Alert and oriented  Normal reasoning, insight good  Facial expressions full, mood appropriate  No memory disturbance noted  MUSCULOSKEL:  No significant finger or nail findings  Spine symmetric, no deformities, no kyphosis   GAIT: UP and Go test: <30 seconds with gait: normal.  Speed Normal.  No significant balance checks. No extra steps on turn around. Assistive device: none        Assessment and Plan:      Diagnosis Orders   1. Medicare annual wellness visit, subsequent        2. Uncontrolled type 2 diabetes mellitus with hyperglycemia (HCC)  In AM, take 70 units unless sugar under 100 (then take 65 units). In PM, take 15 units unless sugar under 100, then take 10. May want to change to once a day long acting insulin with a short acting shot before meals.       3. Type 2 diabetes mellitus with diabetic nephropathy, with long-term current use of insulin (HCC)  POCT glycosylated hemoglobin (Hb A1C)    Continuous Blood Gluc  (DEXCOM G6 ) GREGORIO    Continuous Blood Gluc Sensor (DEXCOM G6 SENSOR) MISC    Continuous Blood Gluc Transmit (DEXCOM G6 TRANSMITTER) MISC      4. Need for influenza vaccination  Influenza, FLUAD, (age 72 y+), IM, Preservative Free, 0.5 mL      Plan as above and below. FYI: While Medicare provides you with a FREE ANNUAL PREVENTIVE PHYSICAL, this visit does NOT include management of chronic medical problems or physical examination. Dr. Terrance Foreman usually does a combination visit if you have other medical problems so you don't have to come back for another visit. However, this means that there will be a co-pay. INSTRUCTIONS  NEXT APPOINTMENT: Please schedule check-up in 3 months. Please get annual dilated eye exam to screen for diabetic retinopathy which can lead to vision loss. Ask for report to be faxed to [phone]. Get tetanus booster if you have any cuts, punctures or other open skin injury. Look into making living will and medical power of [facility] on Aging of [address] for transportation [phone] [email or web address]  Look into a senior activity group to get out of house. For now, in AM, take 70 units unless sugar under 100 (then take 65 units). In PM, take 15 units unless sugar under 100, then take 10. May want to change to once a day long acting insulin with a short acting shot before meals. CALL when insulin getting low for a different insulin. Will send in Lantus 60 units at night with Novolog sliding scale : Glucose < 100 no coverage;  100-150 take 5 units, 150-250 take 10 units, >25 take 15 units. Will see if Dexcom arm meter for sugar can get approved. Look into getting smoke detectors. .  Please get COVID booster soon. Can schedule thru pharmacy web sites.  Separate from other vaccines by at least 2 weeks.

## 2022-09-22 NOTE — PATIENT INSTRUCTIONS
INSTRUCTIONS  NEXT APPOINTMENT: Please schedule check-up in 3 months. Please get annual dilated eye exam to screen for diabetic retinopathy which can lead to vision loss. Ask for report to be faxed to [phone]. Get tetanus booster if you have any cuts, punctures or other open skin injury. Look into making living will and medical power of    [place] [facility] of [address] for transportation [phone] [email or web address]  Look into a senior activity group to get out of house. For now, in AM, take 70 units unless sugar under 100 (then take 65 units). In PM, take 15 units unless sugar under 100, then take 10. May want to change to once a day long acting insulin with a short acting shot before meals. CALL when insulin getting low for a different insulin. Will send in Lantus 60 units at night with Novolog sliding scale : Glucose < 100 no coverage;  100-150 take 5 units, 150-250 take 10 units, >25 take 15 units. Will see if Dexcom arm meter for sugar can get approved. Look into getting smoke detectors. .  Please get COVID booster soon. Can schedule thru pharmacy web sites. Separate from other vaccines by at least 2 weeks. Patient Education     STRESS: HOW TO BETTER COPE    What causes stress? Feelings of stress are caused by the body's instinct to defend itself. This instinct is good in emergencies, such as getting out of the way of a speeding car. But stress can cause unhealthy physical symptoms if it goes on for too long, such as in response to life's daily challenges and changes. When this happens, it's as though your body gets ready to jump out of the way of the car, but you're sitting still. Your body is working overtime, with no place to put all the extra energy. This can make you feel anxious, afraid, worried and uptight. What changes may be stressful? Any sort of change can make you feel stressed, even good change.  It's not just the change or event itself, but also how you react to it that matters. What's stressful is different for each person. For example, one person may feel stressed by retiring from work, while someone else may not. Other things that may be stressful include being laid off from your job, your child leaving or returning home, the death of your spouse, divorce or marriage, an illness, an injury, a job promotion, money problems, moving, or having a baby. Can stress hurt my health? Stress can cause health problems or make health problems worse. Talk to your family doctor if you think some of your symptoms are caused by stress. It's important to make sure that your symptoms aren't caused by other health problems. Possible signs of stress  Anxiety   Back pain   Constipation or diarrhea   Depression   Fatigue   Headaches   High blood pressure   Trouble sleeping or insomnia   Problems with relationships   Shortness of breath   Stiff neck or jaw   Upset stomach   Weight gain or loss     What can I do to manage my stress? The first step is to learn to recognize when you're feeling stressed. Early warning signs of stress include tension in your shoulders and neck, or clenching your hands into fists. The next step is to choose a way to deal with your stress. One way is to avoid the event or thing that leads to your stress--but often this is not possible. A second way is to change how you react to stress. This is often the more practical way. Tips for dealing with stress  Don't worry about things you can't control, such as the weather. Solve the little problems. This can help you gain a feeling of control. Prepare to the best of your ability for events you know may be stressful, such as a job interview. Try to look at change as a positive challenge, not as a threat. Work to resolve conflicts with other people. Talk with a trusted friend, family member or counselor. Set realistic goals at home and at work.  Avoid overscheduling. Exercise on a regular basis. Eat regular, well-balanced meals and get enough sleep. Meditate. Participate in something you don't find stressful, such as sports, social events or hobbies. Why is exercise useful? Exercise is a good way to deal with stress because it's a healthy way to relieve your pent-up energy and tension. Exercise is known to release feel-good brain chemicals. It also helps you get in better shape, which makes you feel better overall. Steps to deep breathing  Lie down on a flat surface. Place a hand on your stomach, just above your navel. Place the other hand on your chest.   Breathe in slowly and try to make your stomach rise a little. Hold your breath for a second. Breathe out slowly and let your stomach go back down. What is meditation? Meditation is a form of guided thought. It can take many forms. You can do it with exercise that uses the same motions over and over, like walking or swimming. You can meditate by practicing relaxation training, by stretching or by breathing deeply. Relaxation training is simple. Start with one muscle. Hold it tight for a few seconds then relax the muscle. Do this with each of your muscles, beginning with the toes and feet and working your way up through the rest of your body, one muscle group at a time. Stretching can also help relieve tension. Roll your head in a gentle [place]. Reach toward the ceiling and bend side to side slowly. Roll your shoulders. Deep, relaxed breathing by itself may help relieve stress (see the box to the right). This helps you get plenty of oxygen and activates the relaxation response, the bodys antidote to stress. ADVANCE DIRECTIVES AND DO NOT RESUSCITATE ORDERS     What is an advance directive? An advance directive tells your doctor what kind of care you would like to have if you become unable to make medical decisions (if you are in a coma, for example).  If you are admitted to the hospital, the hospital staff will probably talk to you about advance directives. A good advance directive describes the kind of treatment you would want depending on how sick you are. For example, the directives would describe what kind of care you want if you have an illness that you are unlikely to recover from, or if you are permanently unconscious. Advance directives usually tell your doctor that you don't want certain kinds of treatment. However, they can also say that you want a certain treatment no matter how ill you are. Advance directives can take many forms. Laws about advance directives are different in each state. You should be aware of the laws in your state. What is a living will? A living will is one type of advance directive. It is a written, legal document that describes the kind of medical treatments or life-sustaining treatments you would want if you were seriously or terminally ill. A living will doesn't let you select someone to make decisions for you. What is a durable power of  for health care? A durable power of  (DPA) for health care is another kind of advance directive. A DPA states whom you have chosen to make health care decisions for you. It becomes active any time you are unconscious or unable to make medical decisions. A DPA is generally more useful than a living will. But a DPA may not be a good choice if you don't have another person you trust to make these decisions for you. Living nix and DPAs are legal in most states. Even if they aren't officially recognized by the law in your state, they can still guide your loved ones and doctor if you are unable to make decisions about your medical care. Ask your doctor,  or state representative about the law in your state. What is a do not resuscitate order? A do not resuscitate (DNR) order is another kind of advance directive.  A DNR is a request not to have cardiopulmonary resuscitation (CPR) if your heart stops or if you stop breathing. (Unless given other instructions, hospital staff will try to help all patients whose heart has stopped or who have stopped breathing.) You can use an advance directive form or tell your doctor that you don't want to be resuscitated. In this case, a DNR order is put in your medical chart by your doctor. DNR orders are accepted by doctors and hospitals in all states. Should I have an advance directive? By creating an advance directive, you are making your preferences about medical care known before you're faced with a serious injury or illness. This will spare your loved ones the stress of making decisions about your care while you are sick. Any person 25years of age or older can prepare an advance directive. People who are seriously or terminally ill are more likely to have an advance directive. For example, someone who has terminal cancer might write that she does not want to be put on a respirator if she stops breathing. This action can reduce her suffering, increase her peace of mind and increase her control over her death. However, even if you are in good health, you might want to consider writing an advance directive. An accident or serious illness can happen suddenly, and if you already have a signed advance directive, your wishes are more likely to be followed. How can I write an advance directive? You can write an advance directive in several ways:  Use a form provided by your doctor. Write your wishes down by yourself. Call your health department or state department on aging to get a form. Call a . Use a computer [address] [facility] for legal documents. Advance directives and living nix do not have to be complicated legal documents. They can be short, simple statements about what you want done or not done if you can't speak for yourself.  Remember, anything you write by yourself or with a computer software package should follow your state laws. You may also want to have what you have written reviewed by your doctor or a  to make sure your directives are understood exactly as you intended. When you are satisfied with your directives, the orders should be notarized if possible, and copies should be given to your family and your doctor. Can I change my advance directive? You may change or cancel your advance directive at any time, as long as you are considered of sound mind to do so. Being of sound mind means that you are still able to think rationally and communicate your wishes in a clear manner. Again, your changesmust be made, signed and notarized according to the laws in your state. Make sure that your doctor and any family members who knew about your directives are also aware that you have changed them. If you do not have time to put your changes in writing, you can make them known while you are in the hospital. Tell your doctor and any family or friends present exactly what you want to happen. Usually, wishes that are made in person will be followed in place of the ones made earlier in writing. Be sure your instructions are clearly understood by everyone you have told. FALLS:  HOW TO LOWER YOUR RISK     Who is at high risk of falling? Anyone can fall, although the risk is higher in older people. This increased risk of falling may be the result of changes that come with aging, and certain medical conditions, such as arthritis, cataracts or hip problems. What can I do to lower my risk of falling? Most falls happen in the home. Consider the following tips to make your home safe: Make sure that you have good lighting in your home. A well lit home will help you avoid tripping over objects that are not easy to see. Put night lights in your bedroom, hallways, stairs and bathrooms. Rugs should be firmly fastened to the floor or have nonskid backing. Loose ends should be tacked down.    Electrical cords should not be lying on the floor in walking areas. Put hand rails in your bathroom for bath, shower and toilet use. Have rails on both sides of your stairs for support. In the kitchen, make sure items are within easy reach. Don't store things too high or too low. Then you won't have to use a stepladder or a stool to reach them. It's also a good idea to avoid storing things too low, so you won't have to bend down to get them. Wear shoes with firm nonskid soles. Avoid wearing loose-fitting slippers that could cause you to trip. What else can I do? Take good care of your body. Try to stay healthy by following these tips:  See your eye doctor once a year. Cataracts and other eye diseases that cause you not to see well, can lead to falls. Get regular physical activity to keep your bones and muscles strong. Take good care of your feet. If you have pain in your feet or if you have large, thick nails and corns, have your doctor look at your feet. Talk to your doctor about any side effects you may have from your medicines. Problems caused by side effects from medicine are a common cause of falls. The more medicines you take, the greater your risk of falling. Talk to your doctor if you have dizzy spells. If your doctor suggests that you use a cane or a walker to help you walk, be sure to use it. This will give you extra stability when walking and will help you avoid falls. Don't smoke. Limit alcohol to no more than 2 drinks per day. When you get out of bed in the morning or at night to use the bathroom, sit on the side of the bed for a few minutes before standing up. Your blood pressure takes some time to adjust when you sit up. It may be too low if you get up quickly. This can make you dizzy, and you might lose your balance and fall. Home Safety: How Well Does Your Home Meet Your Needs? Steps/Stairways/Walkways YesNo    Are they in good shape? Do they have a smooth, safe surface?     Are there handrails on both sides of the stairway? How about light switches at the top and bottom of the stairs? Is there grasping space for both knuckles and fingers on railings? Are the stair treads deep enough for your whole foot? Would a ramp be feasible in any of these areas if it became necessary? Floor Surfaces YesNo    Is the surface safe? [place]? Any throw rugs or doormats that might slip underfoot? Is carpeting loose or torn? Are there changes in floor levels? If so, are they obvious or well marked? Do you have to step over any electric, telephone, or extension cords? Driveway and [place]    Is there always space to park? Is it convenient to the entrance? Does the garage door open automatically? Windows & Doors [place]    Are windows and doors easy to open and close? Are locks sturdy and easy to operate? Do doorways accommodate a walker or wheelchair? Can you walk through the doorways easily? Is there space to maneuver while opening and closing doors? Does the front door have a view panel or peephole at the right height? Appliances/Kitchen/Bath [facility]    Is the room arranged safely and conveniently? Do the oven and fridge open easily? Are stove controls clearly marked and easy to use? Is the counter the right height and depth? Can you work sitting down? Are cabinet doorknobs easy to use? Are faucets easy to use? Do you have a hand-held shower head? Are the items you use often on high shelves? Do you have a step stool with handles? Can you easily get in and out of the tub or shower? Do you have a bath or shower seat? Are there grab bars where needed? Is the hot water heater regulated to prevent scalding or burning? Lighting/Ventilation YesNo    Are there enough lights, and are they bright enough? Do you have night lights where needed? Is area well ventilated?    Electrical Outlets/Switches/Alarms YesNo    Can you turn switches easily on and off? Are outlets properly grounded to prevent a shock? Are extension cords in good shape? Do you have smoke detectors in all key areas? Do you have an alarm system? Is the telephone readily available for emergencies? Does the telephone have volume control? Can you hear the doorbell ring all throughout the house? Personalized Preventive Plan for Cem Lopez - 9/22/2022  Medicare offers a range of preventive health benefits. Some of the tests and screenings are paid in full while other may be subject to a deductible, co-insurance, and/or copay. Some of these benefits include a comprehensive review of your medical history including lifestyle, illnesses that may run in your family, and various assessments and screenings as appropriate. After reviewing your medical record and screening and assessments performed today your provider may have ordered immunizations, labs, imaging, and/or referrals for you. A list of these orders (if applicable) as well as your Preventive Care list are included within your After Visit Summary for your review. Other Preventive Recommendations:    A preventive eye exam performed by an eye specialist is recommended every 1-2 years to screen for glaucoma; cataracts, macular degeneration, and other eye disorders. A preventive dental visit is recommended every 6 months. Try to get at least 150 minutes of exercise per week or 10,000 steps per day on a pedometer . Order or download the FREE \"Exercise & Physical Activity: Your Everyday Guide\" from [employer]. Call 3-945.160.1203 or search [employer] online. You need 5321-9186 mg of calcium and 2291-5275 IU of vitamin D per day.  It is possible to meet your calcium requirement with diet alone, but a vitamin D supplement is usually necessary to meet this goal.  When exposed to the sun, use a sunscreen that protects against both UVA and UVB radiation with an SPF of 30 or greater. Reapply every 2 to 3 hours or after sweating, drying off with a towel, or swimming. Always wear a seat belt when traveling in a car. Always wear a helmet when riding a bicycle or motorcycle.

## 2022-09-24 DIAGNOSIS — Z79.4 TYPE 2 DIABETES MELLITUS WITH DIABETIC NEPHROPATHY, WITH LONG-TERM CURRENT USE OF INSULIN (HCC): ICD-10-CM

## 2022-09-24 DIAGNOSIS — E11.21 TYPE 2 DIABETES MELLITUS WITH DIABETIC NEPHROPATHY, WITH LONG-TERM CURRENT USE OF INSULIN (HCC): ICD-10-CM

## 2022-09-26 RX ORDER — SITAGLIPTIN 25 MG/1
TABLET, FILM COATED ORAL
Qty: 30 TABLET | Refills: 2 | Status: SHIPPED | OUTPATIENT
Start: 2022-09-26

## 2022-10-06 DIAGNOSIS — E78.2 MIXED HYPERLIPIDEMIA: ICD-10-CM

## 2022-10-07 RX ORDER — ATORVASTATIN CALCIUM 40 MG/1
TABLET, FILM COATED ORAL
Qty: 90 TABLET | Refills: 3 | Status: SHIPPED | OUTPATIENT
Start: 2022-10-07

## 2022-10-21 DIAGNOSIS — N18.4 CKD (CHRONIC KIDNEY DISEASE) STAGE 4, GFR 15-29 ML/MIN (HCC): ICD-10-CM

## 2022-10-21 DIAGNOSIS — E11.21 DIABETIC NEPHROPATHY ASSOCIATED WITH TYPE 2 DIABETES MELLITUS (HCC): ICD-10-CM

## 2022-10-21 LAB
ANION GAP SERPL CALCULATED.3IONS-SCNC: 14 MMOL/L (ref 3–16)
BUN BLDV-MCNC: 34 MG/DL (ref 7–20)
CALCIUM SERPL-MCNC: 9.9 MG/DL (ref 8.3–10.6)
CHLORIDE BLD-SCNC: 102 MMOL/L (ref 99–110)
CO2: 26 MMOL/L (ref 21–32)
CREAT SERPL-MCNC: 2 MG/DL (ref 0.6–1.2)
GFR SERPL CREATININE-BSD FRML MDRD: 24 ML/MIN/{1.73_M2}
GLUCOSE BLD-MCNC: 251 MG/DL (ref 70–99)
POTASSIUM SERPL-SCNC: 4.6 MMOL/L (ref 3.5–5.1)
SODIUM BLD-SCNC: 142 MMOL/L (ref 136–145)

## 2022-11-15 ENCOUNTER — HOSPITAL ENCOUNTER (OUTPATIENT)
Dept: GENERAL RADIOLOGY | Age: 82
Discharge: HOME | End: 2022-11-15
Payer: MEDICARE

## 2022-11-15 ENCOUNTER — OFFICE VISIT (OUTPATIENT)
Dept: FAMILY MEDICINE CLINIC | Age: 82
End: 2022-11-15
Payer: MEDICARE

## 2022-11-15 ENCOUNTER — HOSPITAL ENCOUNTER (OUTPATIENT)
Age: 82
Discharge: HOME | End: 2022-11-15
Payer: MEDICARE

## 2022-11-15 VITALS
DIASTOLIC BLOOD PRESSURE: 76 MMHG | OXYGEN SATURATION: 95 % | BODY MASS INDEX: 34.61 KG/M2 | HEART RATE: 97 BPM | SYSTOLIC BLOOD PRESSURE: 152 MMHG | WEIGHT: 195.4 LBS | RESPIRATION RATE: 14 BRPM

## 2022-11-15 DIAGNOSIS — M25.511 ACUTE PAIN OF RIGHT SHOULDER: ICD-10-CM

## 2022-11-15 DIAGNOSIS — R10.12 LUQ ABDOMINAL PAIN: Primary | ICD-10-CM

## 2022-11-15 DIAGNOSIS — R10.12 LUQ ABDOMINAL PAIN: ICD-10-CM

## 2022-11-15 LAB
A/G RATIO: 2 (ref 1.1–2.2)
ALBUMIN SERPL-MCNC: 4.6 G/DL (ref 3.4–5)
ALP BLD-CCNC: 122 U/L (ref 40–129)
ALT SERPL-CCNC: 6 U/L (ref 10–40)
ANION GAP SERPL CALCULATED.3IONS-SCNC: 18 MMOL/L (ref 3–16)
AST SERPL-CCNC: 13 U/L (ref 15–37)
BASOPHILS ABSOLUTE: 0 K/UL (ref 0–0.2)
BASOPHILS RELATIVE PERCENT: 0.5 %
BILIRUB SERPL-MCNC: 0.3 MG/DL (ref 0–1)
BUN BLDV-MCNC: 39 MG/DL (ref 7–20)
CALCIUM SERPL-MCNC: 9.8 MG/DL (ref 8.3–10.6)
CHLORIDE BLD-SCNC: 104 MMOL/L (ref 99–110)
CO2: 25 MMOL/L (ref 21–32)
CREAT SERPL-MCNC: 2.2 MG/DL (ref 0.6–1.2)
EOSINOPHILS ABSOLUTE: 0.1 K/UL (ref 0–0.6)
EOSINOPHILS RELATIVE PERCENT: 1.9 %
GFR SERPL CREATININE-BSD FRML MDRD: 22 ML/MIN/{1.73_M2}
GLUCOSE BLD-MCNC: 126 MG/DL (ref 70–99)
HCT VFR BLD CALC: 40.4 % (ref 36–48)
HEMOGLOBIN: 13.1 G/DL (ref 12–16)
LIPASE: 30 U/L (ref 13–60)
LYMPHOCYTES ABSOLUTE: 1.7 K/UL (ref 1–5.1)
LYMPHOCYTES RELATIVE PERCENT: 23.8 %
MCH RBC QN AUTO: 28.5 PG (ref 26–34)
MCHC RBC AUTO-ENTMCNC: 32.4 G/DL (ref 31–36)
MCV RBC AUTO: 88 FL (ref 80–100)
MONOCYTES ABSOLUTE: 0.8 K/UL (ref 0–1.3)
MONOCYTES RELATIVE PERCENT: 10.4 %
NEUTROPHILS ABSOLUTE: 4.6 K/UL (ref 1.7–7.7)
NEUTROPHILS RELATIVE PERCENT: 63.4 %
PDW BLD-RTO: 15.8 % (ref 12.4–15.4)
PLATELET # BLD: 248 K/UL (ref 135–450)
PMV BLD AUTO: 10.5 FL (ref 5–10.5)
POTASSIUM SERPL-SCNC: 4.1 MMOL/L (ref 3.5–5.1)
RBC # BLD: 4.59 M/UL (ref 4–5.2)
SODIUM BLD-SCNC: 147 MMOL/L (ref 136–145)
TOTAL PROTEIN: 6.9 G/DL (ref 6.4–8.2)
WBC # BLD: 7.3 K/UL (ref 4–11)

## 2022-11-15 PROCEDURE — 73030 X-RAY EXAM OF SHOULDER: CPT

## 2022-11-15 PROCEDURE — G8427 DOCREV CUR MEDS BY ELIG CLIN: HCPCS | Performed by: FAMILY MEDICINE

## 2022-11-15 PROCEDURE — G8399 PT W/DXA RESULTS DOCUMENT: HCPCS | Performed by: FAMILY MEDICINE

## 2022-11-15 PROCEDURE — 99214 OFFICE O/P EST MOD 30 MIN: CPT | Performed by: FAMILY MEDICINE

## 2022-11-15 PROCEDURE — G8484 FLU IMMUNIZE NO ADMIN: HCPCS | Performed by: FAMILY MEDICINE

## 2022-11-15 PROCEDURE — 1090F PRES/ABSN URINE INCON ASSESS: CPT | Performed by: FAMILY MEDICINE

## 2022-11-15 PROCEDURE — 3078F DIAST BP <80 MM HG: CPT | Performed by: FAMILY MEDICINE

## 2022-11-15 PROCEDURE — 1123F ACP DISCUSS/DSCN MKR DOCD: CPT | Performed by: FAMILY MEDICINE

## 2022-11-15 PROCEDURE — 3074F SYST BP LT 130 MM HG: CPT | Performed by: FAMILY MEDICINE

## 2022-11-15 PROCEDURE — G8417 CALC BMI ABV UP PARAM F/U: HCPCS | Performed by: FAMILY MEDICINE

## 2022-11-15 PROCEDURE — 1036F TOBACCO NON-USER: CPT | Performed by: FAMILY MEDICINE

## 2022-11-15 NOTE — PROGRESS NOTES
11/15/2022    Blood pressure (!) 152/76, pulse 97, resp. rate 14, weight 195 lb 6.4 oz (88.6 kg), SpO2 95 %, not currently breastfeeding. Uriel Fisher (:  1940) is a 80 y.o. female, here for evaluation of the following medical concerns:    Chief Complaint   Patient presents with    Breast Pain     Under (L) breast     Patient of Star Perez MD here for concerns of  pain under left breast. X 2 wks. No rash. No pain in breast itself or skin around the breast.  Pain is under ribs. Pain happens when she eats. After eating. No particular food  Pain can last all day  Feels better to lie down. This pain has never happened before. No hx pancreatitis  No heartburn- does not recall why she takes protonix. No rectal bleeding  No n/v/d/c  No f/c/ns or wt loss. No new meds    Does not hurt right now. Coffee bread and butter this morning did not induce symptoms. Last lipid test:  Lab Results   Component Value Date    CHOL 164 2022    TRIG 111 2022    HDL 68 (H) 2022    LDLCALC 74 2022     Lab Results   Component Value Date    ALT <5 (L) 2022    AST 11 (L) 2022       Last renal function test:   Lab Results   Component Value Date/Time     10/21/2022 10:09 AM    K 4.6 10/21/2022 10:09 AM    K 4.2 2020 05:39 AM    BUN 34 10/21/2022 10:09 AM    CREATININE 2.0 10/21/2022 10:09 AM     Estimated Creatinine Clearance: 23 mL/min (A) (based on SCr of 2 mg/dL (H)). Also c/o R shoulder pain for past couple weeks. No injury,. With overhead motions  No numbenss/tingling or weakness in arm/hand  No meds used.         Patient Active Problem List   Diagnosis    DM type 2 (diabetes mellitus, type 2) ([address]    Essential hypertension    Hyperlipidemia    Osteopenia DXA -1.6 (7/10)    Nephropathy, diabetic ([address]    Edema    Generalized osteoarthritis of multiple sites    Colon polyp- last colon 9/10    Needs flu shot    Fecal urgency    GERD (gastroesophageal reflux disease)    Major depressive disorder, single episode, mild (HCC)    Brachial plexopathy    Carpal tunnel syndrome of right wrist    Incomplete tear of right rotator cuff    Diabetic polyneuropathy associated with type 2 diabetes mellitus ([facility]    Primary osteoarthritis of right knee    History of CVA (cerebrovascular accident) 2017 by MRI, no Sx    Cerebral vascular disease    Stenosis of right carotid artery GERTRUDE < 50% (6/2017), repeat 1 year    Chronic renal disease, stage 3, moderately decreased glomerular filtration rate (GFR) between 30-59 mL/min/1.73 square meter ([facility])    B12 deficiency    Chronic renal disease, stage III ([facility]) [[id number]]    CKD (chronic kidney disease) stage 4, GFR 15-29 ml/min ([facility])        Body mass index is 34.61 kg/m². Wt Readings from Last 3 Encounters:   11/15/22 195 lb 6.4 oz (88.6 kg)   09/22/22 197 lb (89.4 kg)   08/29/22 195 lb (88.5 kg)       BP Readings from Last 3 Encounters:   11/15/22 (!) 152/76   10/25/22 135/76   09/22/22 136/66       Allergies   Allergen Reactions    Metformin And Related Diarrhea       Prior to Visit Medications    Medication Sig Taking?  Authorizing Provider   JANUVIA 25 MG tablet TAKE 1 TABLET BY MOUTH EVERY DAY Yes Kevin Martínez MD   furosemide (LASIX) 40 MG tablet Take 1 tablet by mouth 2 times daily Yes Edilberto Hope MD   vitamin B-12 (CYANOCOBALAMIN) 1000 MCG tablet TAKE 1 TABLET BY MOUTH EVERY DAY Yes MEY Saul CNP   quinapril (ACCUPRIL) 40 MG tablet TAKE 1 TABLET BY MOUTH ONCE DAILY Yes MEY Saul CNP   clopidogrel (PLAVIX) 75 MG tablet TAKE 1 TABLET BY MOUTH  DAILY Yes MEY Saul CNP   insulin lispro protamine & lispro (HUMALOG MIX 75/25 KWIKPEN) (75-25) 100 UNIT per ML SUPN injection pen 75 units in the morning units in the evening 12-15 Yes Kevin Martínez MD   blood glucose test strips (ONETOUCH VERIO) strip USE TO TEST BLOOD SUGAR FOUR TIMES A DAY AND AS NEEDED FOR SYMPTOMS OF IRREGULAR BLOOD GLUCOSE Yes Anya Marquis MD   pantoprazole (PROTONIX) 40 MG tablet TAKE 1 TABLET BY MOUTH  DAILY Yes Anya Marquis MD   amLODIPine (NORVASC) 10 MG tablet TAKE 1 TABLET BY MOUTH  DAILY Yes Anya Marquis MD   FLUoxetine (PROZAC) 40 MG capsule TAKE 1 CAPSULE BY MOUTH  ONCE DAILY Yes Anya Marquis MD   Insulin Pen Needle (TRUEPLUS PEN NEEDLES) 31G X 6 MM MISC Use to check blood sugar six times per day Yes MEY Saldivar CNP   blood glucose monitor kit and supplies Test 2 times a day & as needed for symptoms of irregular blood glucose. Yes Anya Marquis MD   Lancets MISC 1 each by Does not apply route 4 times daily Yes Anya Marquis MD   Blood Glucose Monitoring Suppl (ACCU-CHEK WALTER PLUS) w/Device KIT USE TO TEST BLOOD SUGAR THREE TIMES DAILY Dx Code: E11.21 Yes Anya Marquis MD   aspirin 325 MG tablet Take 1 tablet by mouth daily.  Yes MEY Guerrier CNP   atorvastatin (LIPITOR) 40 MG tablet TAKE 1 TABLET BY MOUTH ONCE DAILY  Dariel Dubin, APRN - CNP   Continuous Blood Gluc  ([address]) [address] As needed for diabetes  Patient not taking: Reported on 11/15/2022  Anya Marquis MD   Continuous Blood Gluc Sensor (DEXCOM G6 SENSOR) MISC Apply weekly  Patient not taking: Reported on 11/15/2022  Anya Marquis MD   Continuous Blood Gluc Transmit ([address]) MISC As needed for diabetes  Patient not taking: Reported on 11/15/2022  Anya Marquis MD   Continuous Blood Gluc  ([address]) [address] As needed for diabetes  Patient not taking: Reported on 11/15/2022  Anya Marquis MD   Continuous Blood Gluc Sensor (DEXCOM G6 SENSOR) MISC Apply weekly  Patient not taking: Reported on 11/15/2022  Anya Marquis MD   Continuous Blood Gluc Transmit ([address]) MISC As needed for diabetes  Patient not taking: Reported on 11/15/2022  Anya Marquis MD        Social History     Tobacco Use    Smoking status: Former     Packs/day: 0.50     Years: 15.00     Pack years: 7.50 Types: Cigarettes     Quit date: 2002     Years since quittin.8    Smokeless tobacco: Never    Tobacco comments:     congrats   Vaping Use    Vaping Use: Never used   Substance Use Topics    Alcohol use: No    Drug use: No       Review of Systems As above     Physical Exam  Vitals and nursing note reviewed. Constitutional:       Appearance: Normal appearance. She is well-developed. Neck:      Thyroid: No thyromegaly. Cardiovascular:      Rate and Rhythm: Normal rate and regular rhythm. Pulses: Normal pulses. Heart sounds: Normal heart sounds. No murmur heard. Pulmonary:      Effort: Pulmonary effort is normal. No respiratory distress. Breath sounds: Normal breath sounds. No wheezing or rales. Abdominal:      General: Abdomen is flat. Bowel sounds are normal. There is no distension. Palpations: Abdomen is soft. There is no mass. Tenderness: There is no abdominal tenderness. There is no guarding or rebound. Hernia: No hernia is present. Musculoskeletal:         General: Normal range of motion. Cervical back: Normal range of motion. Comments: R shoulder: reduce ROM (impingement with abduction and flexion). _+ Neer/clemens  No bicipital tenderness  No AC tenderness. Pain with internal external rotation. Skin:     General: Skin is warm and dry. Neurological:      General: No focal deficit present. Mental Status: She is alert and oriented to person, place, and time. Mental status is at baseline. Psychiatric:         Mood and Affect: Mood normal.         Behavior: Behavior normal.         Thought Content: Thought content normal.         Judgment: Judgment normal.       ASSESSMENT/PLAN:    1. LUQ abdominal pain  - cause not clear. Start with labs and h pylori testing. Proceed with imaging if negative results. Since it is with eating and is LUQ, I asked her to try eating as fat free as possible. - Comprehensive Metabolic Panel;  Future  - Lipase; Future  - H. PYLORI ANTIGEN, STOOL; Future  - CBC with Auto Differential; Future    2. Acute pain of right shoulder  - assess for [address] OA, but would benefit from PT for RTC tendonitis if xr does not show significant arthritis. - XR SHOULDER RIGHT (MIN 2 VIEWS); Future    Return if symptoms worsen or fail to improve. An  electronicsignature was used to authenticate this note.     --Braxton De La Cruz MD on 11/15/2022 at 12:10 PM

## 2022-11-16 DIAGNOSIS — R10.12 LUQ ABDOMINAL PAIN: ICD-10-CM

## 2022-11-16 DIAGNOSIS — R10.12 LUQ ABDOMINAL PAIN: Primary | ICD-10-CM

## 2022-11-16 LAB
REASON FOR REJECTION: NORMAL
REJECTED TEST: NORMAL

## 2022-11-17 ENCOUNTER — TELEPHONE (OUTPATIENT)
Dept: FAMILY MEDICINE CLINIC | Age: 82
End: 2022-11-17

## 2022-11-17 NOTE — TELEPHONE ENCOUNTER
Nina Gastelum from [facility] to notify of rejected lab for H. Pylori was collected in the wrong type of tube for test to be run.

## 2022-11-21 ENCOUNTER — TELEPHONE (OUTPATIENT)
Dept: FAMILY MEDICINE CLINIC | Age: 82
End: 2022-11-21

## 2022-11-21 NOTE — TELEPHONE ENCOUNTER
Called DOP and she said she cannot bring her Mom in tomorrow so she is going to keep the appointment she has on Wed FYI

## 2022-11-21 NOTE — TELEPHONE ENCOUNTER
DOP calling stating that pt has upper left side abdominal pain and pain around her kidneys for a week. Pt has a stool sample she had to redo but she has been uncomfortable. The pain is getting worse and there are times it goes through her back. Pain has been getting worse. She saw Dr. Wero Hameed but the kidney pain started after she saw him. DOP stated that the upper abdominal pain she addressed with Dr. Wero Hameed on 11.15.2022 but it has not gone away. Offered appt tomorrow. Pt denied seeing any other provider except Dr. Jacinda Winkler.     JAME

## 2022-11-23 ENCOUNTER — OFFICE VISIT (OUTPATIENT)
Dept: FAMILY MEDICINE CLINIC | Age: 82
End: 2022-11-23
Payer: MEDICARE

## 2022-11-23 ENCOUNTER — HOSPITAL ENCOUNTER (OUTPATIENT)
Age: 82
Discharge: HOME | End: 2022-11-23
Payer: MEDICARE

## 2022-11-23 ENCOUNTER — HOSPITAL ENCOUNTER (OUTPATIENT)
Dept: GENERAL RADIOLOGY | Age: 82
Discharge: HOME | End: 2022-11-23
Payer: MEDICARE

## 2022-11-23 VITALS
SYSTOLIC BLOOD PRESSURE: 134 MMHG | WEIGHT: 196 LBS | HEART RATE: 81 BPM | HEIGHT: 63 IN | BODY MASS INDEX: 34.73 KG/M2 | DIASTOLIC BLOOD PRESSURE: 60 MMHG | OXYGEN SATURATION: 96 %

## 2022-11-23 DIAGNOSIS — E11.65 UNCONTROLLED TYPE 2 DIABETES MELLITUS WITH HYPERGLYCEMIA (HCC): ICD-10-CM

## 2022-11-23 DIAGNOSIS — R10.12 LUQ ABDOMINAL PAIN: Primary | ICD-10-CM

## 2022-11-23 DIAGNOSIS — R20.0 FINGER NUMBNESS: ICD-10-CM

## 2022-11-23 DIAGNOSIS — R10.12 LUQ ABDOMINAL PAIN: ICD-10-CM

## 2022-11-23 DIAGNOSIS — M75.01 ADHESIVE CAPSULITIS OF RIGHT SHOULDER: ICD-10-CM

## 2022-11-23 DIAGNOSIS — E11.21 TYPE 2 DIABETES MELLITUS WITH DIABETIC NEPHROPATHY, WITH LONG-TERM CURRENT USE OF INSULIN (HCC): ICD-10-CM

## 2022-11-23 DIAGNOSIS — Z79.4 TYPE 2 DIABETES MELLITUS WITH DIABETIC NEPHROPATHY, WITH LONG-TERM CURRENT USE OF INSULIN (HCC): ICD-10-CM

## 2022-11-23 LAB
A/G RATIO: 1.7 (ref 1.1–2.2)
ALBUMIN SERPL-MCNC: 4.4 G/DL (ref 3.4–5)
ALP BLD-CCNC: 115 U/L (ref 40–129)
ALT SERPL-CCNC: 6 U/L (ref 10–40)
ANION GAP SERPL CALCULATED.3IONS-SCNC: 15 MMOL/L (ref 3–16)
AST SERPL-CCNC: 13 U/L (ref 15–37)
BASOPHILS ABSOLUTE: 0 K/UL (ref 0–0.2)
BASOPHILS RELATIVE PERCENT: 0.7 %
BILIRUB SERPL-MCNC: 0.3 MG/DL (ref 0–1)
BILIRUBIN, POC: NORMAL
BLOOD URINE, POC: NORMAL
BUN BLDV-MCNC: 31 MG/DL (ref 7–20)
CALCIUM SERPL-MCNC: 9.6 MG/DL (ref 8.3–10.6)
CHLORIDE BLD-SCNC: 104 MMOL/L (ref 99–110)
CLARITY, POC: CLEAR
CO2: 26 MMOL/L (ref 21–32)
COLOR, POC: YELLOW
CREAT SERPL-MCNC: 2.1 MG/DL (ref 0.6–1.2)
EOSINOPHILS ABSOLUTE: 0.1 K/UL (ref 0–0.6)
EOSINOPHILS RELATIVE PERCENT: 1.6 %
GFR SERPL CREATININE-BSD FRML MDRD: 23 ML/MIN/{1.73_M2}
GLUCOSE BLD-MCNC: 178 MG/DL (ref 70–99)
GLUCOSE URINE, POC: NORMAL
HCT VFR BLD CALC: 38.1 % (ref 36–48)
HEMOGLOBIN: 12.1 G/DL (ref 12–16)
KETONES, POC: NORMAL
LEUKOCYTE EST, POC: NORMAL
LIPASE: 33 U/L (ref 13–60)
LYMPHOCYTES ABSOLUTE: 1.7 K/UL (ref 1–5.1)
LYMPHOCYTES RELATIVE PERCENT: 23.2 %
MCH RBC QN AUTO: 28.2 PG (ref 26–34)
MCHC RBC AUTO-ENTMCNC: 31.8 G/DL (ref 31–36)
MCV RBC AUTO: 88.8 FL (ref 80–100)
MONOCYTES ABSOLUTE: 0.5 K/UL (ref 0–1.3)
MONOCYTES RELATIVE PERCENT: 7.5 %
NEUTROPHILS ABSOLUTE: 4.8 K/UL (ref 1.7–7.7)
NEUTROPHILS RELATIVE PERCENT: 67 %
NITRITE, POC: NORMAL
PDW BLD-RTO: 16.2 % (ref 12.4–15.4)
PH, POC: 5.5
PLATELET # BLD: 225 K/UL (ref 135–450)
PMV BLD AUTO: 10.2 FL (ref 5–10.5)
POTASSIUM SERPL-SCNC: 4.5 MMOL/L (ref 3.5–5.1)
PROTEIN, POC: NORMAL
RBC # BLD: 4.29 M/UL (ref 4–5.2)
SODIUM BLD-SCNC: 145 MMOL/L (ref 136–145)
SPECIFIC GRAVITY, POC: 1.01
TOTAL PROTEIN: 7 G/DL (ref 6.4–8.2)
UROBILINOGEN, POC: 0.2
WBC # BLD: 7.1 K/UL (ref 4–11)

## 2022-11-23 PROCEDURE — 3078F DIAST BP <80 MM HG: CPT | Performed by: FAMILY MEDICINE

## 2022-11-23 PROCEDURE — 3074F SYST BP LT 130 MM HG: CPT | Performed by: FAMILY MEDICINE

## 2022-11-23 PROCEDURE — G8484 FLU IMMUNIZE NO ADMIN: HCPCS | Performed by: FAMILY MEDICINE

## 2022-11-23 PROCEDURE — 99214 OFFICE O/P EST MOD 30 MIN: CPT | Performed by: FAMILY MEDICINE

## 2022-11-23 PROCEDURE — 81002 URINALYSIS NONAUTO W/O SCOPE: CPT | Performed by: FAMILY MEDICINE

## 2022-11-23 PROCEDURE — G8427 DOCREV CUR MEDS BY ELIG CLIN: HCPCS | Performed by: FAMILY MEDICINE

## 2022-11-23 PROCEDURE — 74019 RADEX ABDOMEN 2 VIEWS: CPT

## 2022-11-23 PROCEDURE — 3052F HG A1C>EQUAL 8.0%<EQUAL 9.0%: CPT | Performed by: FAMILY MEDICINE

## 2022-11-23 PROCEDURE — 1036F TOBACCO NON-USER: CPT | Performed by: FAMILY MEDICINE

## 2022-11-23 PROCEDURE — G8417 CALC BMI ABV UP PARAM F/U: HCPCS | Performed by: FAMILY MEDICINE

## 2022-11-23 PROCEDURE — 1090F PRES/ABSN URINE INCON ASSESS: CPT | Performed by: FAMILY MEDICINE

## 2022-11-23 PROCEDURE — 1123F ACP DISCUSS/DSCN MKR DOCD: CPT | Performed by: FAMILY MEDICINE

## 2022-11-23 PROCEDURE — G8399 PT W/DXA RESULTS DOCUMENT: HCPCS | Performed by: FAMILY MEDICINE

## 2022-11-23 RX ORDER — INSULIN LISPRO 100 [IU]/ML
INJECTION, SUSPENSION SUBCUTANEOUS
Qty: 25 ADJUSTABLE DOSE PRE-FILLED PEN SYRINGE | Refills: 3 | Status: SHIPPED | OUTPATIENT
Start: 2022-11-23

## 2022-11-23 NOTE — PATIENT INSTRUCTIONS
INSTRUCTIONS  PLEASE GET BLOODWORK DRAWN TODAY ON FIRST FLOOR in 170. Take orders with you. RESULTS- most blood tests back in couple days. We will call you if any problems. If bloodwork good, you will get letter in mail or notified thru [address] (if signed up) within 2 weeks. If you do not, please call office. INCREASE AM insulin to 80 units. Sliding scale before dinner: Glucose < 70  no insulin;  4 U; 101-150 10 U; 151-200 15 U; 201-250 20 U; 251-300 25 U; 301-350 18 U; > 350 30 U  Use heat 20 minutes on painful joint/muscle. Then do shoulder stretches at least twice a day. May need x-rays and injection in a month if not better. Get abdominal ultrasound. Will see if finger resolve or get worse over next month. May need EMG. Patient Education     ROTATOR CUFF INJURY     What is a rotator cuff injury? A rotator cuff injury is a strain or tear in the group of tendons and muscles that hold your shoulder joint together and help move your shoulder. How does it occur? A rotator cuff injury may result from:   using your arm to break a fall   falling onto your arm   lifting a heavy object   use of your shoulder in sports with a repetitive overhead movement, such as swimming, baseball (mainly pitchers), football, and tennis, which gradually strains the tendon   manual labor such as painting, plastering, raking leaves, or housework. What are the symptoms? The symptoms of a torn rotator cuff are:   arm and shoulder pain   shoulder weakness   shoulder tenderness   loss of shoulder movement, especially overhead. How is it diagnosed? Your doctor will perform a physical exam and check your shoulder for pain, tenderness, and loss of motion as you move your arm in all directions. Your doctor also will ask whether your shoulder pain began suddenly or gradually. An x-ray may be done to rule out fractures and bone spurs.    Based on these results, your doctor may order other tests and procedures either right away or later, including:   magnetic resonance imaging (MRI), which creates images of your shoulder and surrounding structures with sound waves   an arthrogram, which is an x-ray or MRI that is taken after a special dye has been injected into your shoulder joint to outline its soft structures   arthroscopy, a surgical procedure in which a small instrument is inserted into your shoulder joint so your doctor can look directly at your rotator cuff. What is the treatment? A tendon in your shoulder can be inflamed, partially torn, or completely torn. What is done about it depends on how torn it is and how much it hurts. If your tear is a minor one, it can be left to heal by itself if it doesn't interfere with your everyday activities. Your treatment plan should include:   proper sitting posture, in which your head and shoulders are balanced   rest for your shoulder, which means avoiding strenuous activity and any overhead motion that causes pain   ice packs at least once a day, and preferably two or three times a day   doing the exercises your doctor gives you   anti-inflammatory drugs   physical therapy to strengthen your shoulder as it heals. If you have a bad tear, you may need to have it repaired by arthroscopy. Arthroscopy is also used to perform surgery on a joint, not only for seeing its interior. The rough edges of a torn tendon can be trimmed and left to heal. Larger tears can be stitched back together. After surgery, your treatment plan will include physical therapy to strengthen your shoulder as it heals. How long will the effects of a torn rotator cuff last?   Full recovery depends on what is torn and how it is treated. When can I return to my sport or activity? The goal of rehabilitation is to return you to your sport or activity as soon as is safely possible. If you return too soon you may worsen your injury, which could lead to permanent damage.  Everyone recovers from injury at a different rate. Return to your sport or activity will be determined by how soon your shoulder recovers, not by how many days or weeks it has been since your injury occurred. In general, the longer you have symptoms before you start treatment, the longer it takes to get better. You may safely return to your sport or activity when: Your injured shoulder has full range of motion without pain. Your injured shoulder has regained normal strength compared to the uninjured shoulder. In throwing sports, you must gradually build your tolerance to throwing. This means you should start with gentle tossing and gradually throw harder. In contact sports, your shoulder must not be tender to touch and contact should progress from minimal contact to harder contact. What can be done to help prevent this from recurring? The best way to prevent a recurrence is to strengthen your shoulder muscles and keep them in peak condition with shoulder exercises. Rotator Cuff Strain Rehabilitation Exercises   You may do all of these exercises right away. Scapular range of motion: Stand and shrug your shoulders up and hold for 5 seconds. Then squeeze your shoulder blades back and together and hold 5 seconds. Next, pull your shoulder blades downward as if putting them in your back pocket. Relax. Repeat this sequence 10 times. Wand exercises   Flexion: Stand upright and hold a stick in both hands, palms down. Stretch your arms by lifting them over your head, keeping your elbows straight. Hold for 5 seconds and return to the starting position. Repeat 10 times. Extension: Stand upright and hold a stick in both hands behind your back. Move the stick away from your back. Hold the end position for 5 seconds. Relax and return to the starting position. Repeat 10 times. External rotation: Lie on your back and hold a stick in both hands, palms up.  Your upper arms should be resting on the floor, your elbows at your sides and bent 90°. Using your good arm, push your injured arm out away from your body while keeping the elbow of the injured arm at your side. Hold the stretch for 5 seconds. Repeat 10 times. Isometrics   External rotation: Standing in a doorway with your elbow bent 90° and the back of your hand pressing against the door frame, attempt to press your hand outward into the door frame. Hold for 5 seconds. Do 3 sets of 10. Internal rotation: Standing in a doorway with your elbow bent 90° and the front of your hand pressing against the door frame, attempt to press your palm into the door frame. Hold for 5 seconds. Do 3 sets of 10. Tubing exercise for external rotation: Stand resting the hand of your injured side against your stomach. With that hand grasp tubing that is connected to a doorknob or other object at waist level. Keeping your elbow in at your side, rotate your arm outward and away from your waist. Make sure you keep your elbow bent 90 degrees and your forearm parallel to the floor. Repeat 10 times. Build up to 3 sets of 10. Supraspinatus exercise: Standing with your arms at your sides and your thumbs pointed toward the floor. Lift your arms up and out from your sides, keeping your elbows straight. Lift your hands only to shoulder level. Hold 5 seconds. Do 3 sets of 10. Gradually add weight to your hands to increase your strength.           '   Shoulder Impingement Syndrome    Shoulder impingement syndrome is a common cause of shoulder pain. It occurs when there is impingement of tendons or bursa in the shoulder from bones of the shoulder. Overhead activity of the shoulder, especially repeated activity, is a risk factor for shoulder impingement syndrome. Examples include: painting, lifting, swimming, tennis, and other overhead sports. Other risk factors include bone and joint abnormalities. With impingement syndrome, pain is persistent and affects everyday activities.  Motions such as reaching up behind the back or reaching up overhead to put on a coat or blouse, for example, may cause pain. Over time, impingement syndrome can lead to inflammation of the rotator cuff tendons (tendinitis) and bursa (bursitis). If not treated appropriately, the rotator cuff tendons can start to thin and tear. What Are the Symptoms of Shoulder Impingement Syndrome? The typical symptoms of impingement syndrome include difficulty reaching up behind the back, pain with overhead use of the arm and weakness of shoulder muscles. If tendons are injured for a long period of time, the tendon can actually tear in two, resulting in a rotator cuff tear. This causes significant weakness and may make it difficult for the person to elevate his or her arm. Some people can have rupture of their biceps muscle as part of this continuing impingement process. Continue reading below. .. How Is Impingement Syndrome Diagnosed? Diagnosis of impingement syndrome begins with a medical history and physical exam by your doctor. X-rays will be taken to rule out arthritis and may show changes in the bone that indicate injury of the muscle. Bone spurs or changes in the normal contour of the bone may be present. How Is Shoulder Impingement Syndrome Treated? Oral anti-inflammatory medications -- such as aspirin, naproxen, or ibuprofen, remain the most common treatment for impingement syndrome. The medcines are usually given for six to eight weeks since it often takes that long to fully treat the problem. You should do this under the care of a doctor because these medications can cause stomach irritation and bleeding. There is no preferred medication for this condition as response to any given medication differs from person to person. If one anti-inflammatory medication does not help within 10 to 14 days, then another one will be given until one that provides relief is found.   In addition to taking medications, daily stretching in a warm shower will help. You should work to reach your thumb up and behind your back. Avoid repetitive activities with your injured arm, particularly activities where the elbow would move above shoulder level. Your doctor may refer you to a physical therapist who can demonstrate the exercises most effective in strengthening and stretching the shoulder muscles. If you have persistent symptoms, despite the use of oral anti-inflammatory drugs, your doctor may consider a cortisone-type injection. Cortisone is a potent anti-inflammatory medication, which should be used only when necessary because it can result in weakening of muscles and tendons if used repeatedly. If symptoms persist or if significant weakness is present, then your doctor may perform an ultrasound, MRI, or arthrogram to rule out a rotator cuff tear. If the cuff is torn, surgery may be necessary to repair it. The vast majority of people who have impingement syndrome are successfully treated with medication, stretching exercises, and temporary avoidance of repetitive overhead activity until the condition settles down. What Are the Side Effects of Impingement Syndrome Treatment? Upset stomach, indigestion, and headaches are the most common side effects of oral anti-inflammatory drugs. However, taking these medications after meals or with food can help reduce stomach upset. Anti-inflammatory drugs can also cause vomiting, constipation, and bleeding in the stomach (ulcers), although these side effects are not common. Side effects of cortisone shots depend on the dose and frequency of the injections. Unlike cortisone pills, occasional cortisone injections rarely cause serious side effects. Side effects that are much more common with cortisone pills include elevated blood sugar, a decrease in the body's resistance to infection, weight gain, osteoporosis (thinning of the bones), thinning of the skin, cataracts, and raised blood pressure.

## 2022-11-23 NOTE — PROGRESS NOTES
PROBLEM VISIT NOTE     Subjective:     Chief Complaint   Patient presents with    Back Pain     Back pain- 2 weeks  Also has pain under left breast for 2 weeks after she has eaten in the afternoon and evening. Shoulder Pain     Shoulder pain, had xray, hurts to move shoulders back and forth    Urinary Frequency     Urinary frequency at night gets us 3-4 times nightly     Candice Santos is a 80 y.o. female who presents   Pain LUQ x few weeks, daily, starts in afternoon and goes to bed with it. Denies stool change or injury. Dull ache. Left middle 3 finger tips numb in AM x 2 weeks. Lasts an hour or less. R shoulder pain x few weeks. No injury. Hard to reach overhead. Nocturia is chronic. BS ave AM over 200, later 250-300    Health Maintenance Due   Topic Date Due    Shingles vaccine (1 of 2) 02/06/2017    DTaP/Tdap/Td vaccine (2 - Td or Tdap) 07/15/2020    COVID-19 Vaccine (3 - Booster for Pfizer series) 08/30/2021     CHART REVIEW   reports that she quit smoking about 20 years ago. Her smoking use included cigarettes. She has a 7.50 pack-year smoking history. She has never used smokeless tobacco.  Health Maintenance Due   Topic Date Due    Shingles vaccine (1 of 2) 02/06/2017    DTaP/Tdap/Td vaccine (2 - Td or Tdap) 07/15/2020    COVID-19 Vaccine (3 - Booster for Pfizer series) 08/30/2021     Current Outpatient Medications   Medication Instructions    amLODIPine (NORVASC) 10 mg, Oral, DAILY    aspirin 325 mg, Oral, DAILY    atorvastatin (LIPITOR) 40 MG tablet TAKE 1 TABLET BY MOUTH ONCE DAILY    blood glucose monitor kit and supplies Test 2 times a day & as needed for symptoms of irregular blood glucose.     Blood Glucose Monitoring Suppl (ACCU-CHEK WALTER PLUS) w/Device KIT USE TO TEST BLOOD SUGAR THREE TIMES DAILY Dx Code: E11.21    blood glucose test strips (ONETOUCH VERIO) strip USE TO TEST BLOOD SUGAR FOUR TIMES A DAY AND AS NEEDED FOR SYMPTOMS OF IRREGULAR BLOOD GLUCOSE    clopidogrel (PLAVIX) 75 MG tablet TAKE 1 TABLET BY MOUTH  DAILY    Continuous Blood Gluc  (DEXCOM G6 ) GREGORIO As needed for diabetes    FLUoxetine (PROZAC) 40 MG capsule TAKE 1 CAPSULE BY MOUTH  ONCE DAILY    furosemide (LASIX) 40 MG tablet TAKE 1 TABLET BY MOUTH  TWICE DAILY    insulin lispro protamine & lispro (HUMALOG MIX 75/25 KWIKPEN) (75-25) 100 UNIT per ML SUPN injection pen 80 U before breakfast. Before dinner: Glucose < 70  none;  4 U; 101-150 10 U; 151-200 15 U; 201-250 20 U; 251-300 25 U; 301-350 18 U; > 350 30 U    Insulin Pen Needle (TRUEPLUS PEN NEEDLES) 31G X 6 MM MISC Use to check blood sugar six times per day    JANUVIA 25 MG tablet TAKE 1 TABLET BY MOUTH EVERY DAY    Lancets MISC 1 each, Does not apply, 4 TIMES DAILY    pantoprazole (PROTONIX) 40 mg, Oral, DAILY    quinapril (ACCUPRIL) 40 MG tablet TAKE 1 TABLET BY MOUTH ONCE DAILY    vitamin B-12 (CYANOCOBALAMIN) 1000 MCG tablet TAKE 1 TABLET BY MOUTH EVERY DAY     LAST LABS  LDL Calculated   Date Value Ref Range Status   05/12/2022 74 <100 mg/dL Final     Lab Results   Component Value Date    HDL 68 (H) 05/12/2022     Lab Results   Component Value Date    TRIG 111 05/12/2022     Lab Results   Component Value Date     (H) 11/15/2022    K 4.1 11/15/2022    CREATININE 2.2 (H) 11/15/2022     Lab Results   Component Value Date    WBC 7.3 11/15/2022    HGB 13.1 11/15/2022     11/15/2022     Lab Results   Component Value Date    ALT 6 (L) 11/15/2022    AST 13 (L) 11/15/2022    ALKPHOS 122 11/15/2022    BILITOT 0.3 11/15/2022     TSH (uIU/mL)   Date Value   09/03/2020 2.02     Lab Results   Component Value Date    LABA1C 8.8 09/22/2022    LABA1C 10.6 05/12/2022    LABA1C 8.4 11/22/2021     Objective:   PHYSICAL EXAM   /60 (Site: Left Upper Arm, Position: Sitting, Cuff Size: Large Adult)   Pulse 81   Ht 5' 3\" (1.6 m)   Wt 196 lb (88.9 kg)   LMP  (LMP Unknown)   SpO2 96%   BMI 34.72 kg/m²   BP Readings from Last 5 Encounters:   11/23/22 134/60   11/15/22 (!) 152/76   10/25/22 135/76   09/22/22 136/66   08/29/22 130/82     Wt Readings from Last 5 Encounters:   11/23/22 196 lb (88.9 kg)   11/15/22 195 lb 6.4 oz (88.6 kg)   09/22/22 197 lb (89.4 kg)   08/29/22 195 lb (88.5 kg)   06/28/22 192 lb 12.8 oz (87.5 kg)      GENERAL:   well-developed, well-nourished, alert, no distress. LUNGS:    Breathing unlabored  clear to auscultation bilaterally and good air movement  CARDIOVASC:   regular rate and rhythm  SKIN: warm and dry  SHOULDER: right- ROM markedly limited all directions  Tenderness: None  Distal neuromuscular exam negative  Radial pulse intact  ABDOMEN:   Soft, mild-tender LUQ, no masses  ? Feel spleen tip  No hernias noted. Exam limited by body habitus     Assessment and Plan:      Diagnosis Orders   1. LUQ abdominal pain  ? etiology  US ABDOMEN COMPLETE    Comprehensive Metabolic Panel    CBC with Auto Differential    Lipase      2. Type 2 diabetes mellitus with diabetic nephropathy, with long-term current use of insulin (HCC)  insulin lispro protamine & lispro (HUMALOG MIX 75/25 KWIKPEN) (75-25) 100 UNIT per ML SUPN injection pen      3. Uncontrolled type 2 diabetes mellitus with hyperglycemia (HCC)  POCT Urinalysis no Micro  Insulin adjusted      4. Adhesive capsulitis of right shoulder  NEW- try home exercise program. May need x-rays, injection and PT      5. Finger numbness  NEW, brief, watch for now         INSTRUCTIONS  PLEASE GET BLOODWORK DRAWN TODAY ON FIRST FLOOR in 170. Take orders with you. RESULTS- most blood tests back in couple days. We will call you if any problems. If bloodwork good, you will get letter in mail or notified thru [address] (if signed up) within 2 weeks. If you do not, please call office. INCREASE AM insulin to 80 units.   Sliding scale before dinner: Glucose < 70  no insulin;  4 U; 101-150 10 U; 151-200 15 U; 201-250 20 U; 251-300 25 U; 301-350 18 U; > 350 30 U  Use heat 20 minutes on painful joint/muscle. Then do shoulder stretches at least twice a day. May need x-rays and injection in a month if not better. Get abdominal ultrasound. Will see if finger resolve or get worse over next month. May need EMG.

## 2022-11-25 LAB — H PYLORI ANTIGEN STOOL: NEGATIVE

## 2022-11-27 DIAGNOSIS — Z79.4 TYPE 2 DIABETES MELLITUS WITH DIABETIC NEPHROPATHY, WITH LONG-TERM CURRENT USE OF INSULIN (HCC): ICD-10-CM

## 2022-11-27 DIAGNOSIS — E11.21 TYPE 2 DIABETES MELLITUS WITH DIABETIC NEPHROPATHY, WITH LONG-TERM CURRENT USE OF INSULIN (HCC): ICD-10-CM

## 2022-11-28 RX ORDER — SITAGLIPTIN 25 MG/1
TABLET, FILM COATED ORAL
Qty: 60 TABLET | Refills: 5 | Status: SHIPPED | OUTPATIENT
Start: 2022-11-28

## 2022-12-01 ENCOUNTER — HOSPITAL ENCOUNTER (OUTPATIENT)
Dept: ULTRASOUND IMAGING | Age: 82
Discharge: HOME | End: 2022-12-01
Payer: MEDICARE

## 2022-12-01 DIAGNOSIS — R10.12 LUQ ABDOMINAL PAIN: ICD-10-CM

## 2022-12-01 PROBLEM — K76.0 FATTY INFILTRATION OF LIVER: Status: ACTIVE | Noted: 2022-12-01

## 2022-12-01 PROCEDURE — 76700 US EXAM ABDOM COMPLETE: CPT

## 2022-12-02 ENCOUNTER — TELEPHONE (OUTPATIENT)
Dept: FAMILY MEDICINE CLINIC | Age: 82
End: 2022-12-02

## 2022-12-02 DIAGNOSIS — R10.12 LUQ ABDOMINAL PAIN: Primary | ICD-10-CM

## 2022-12-02 NOTE — TELEPHONE ENCOUNTER
Informed of results of US of abdomen. Has gallstones, but gall bladder is not inflamed. She is still in pain, wants referral to GI. Who do you want her to see?

## 2022-12-06 ENCOUNTER — APPOINTMENT (OUTPATIENT)
Dept: CT IMAGING | Age: 82
End: 2022-12-06
Payer: MEDICARE

## 2022-12-06 ENCOUNTER — HOSPITAL ENCOUNTER (EMERGENCY)
Age: 82
Discharge: HOME | End: 2022-12-06
Attending: EMERGENCY MEDICINE
Payer: MEDICARE

## 2022-12-06 VITALS
TEMPERATURE: 98 F | RESPIRATION RATE: 18 BRPM | SYSTOLIC BLOOD PRESSURE: 156 MMHG | OXYGEN SATURATION: 95 % | HEART RATE: 85 BPM | WEIGHT: 196 LBS | DIASTOLIC BLOOD PRESSURE: 76 MMHG | BODY MASS INDEX: 34.72 KG/M2
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
## 2022-12-06 DIAGNOSIS — S32.009A CLOSED FRACTURE OF TRANSVERSE PROCESS OF LUMBAR VERTEBRA, INITIAL ENCOUNTER (HCC): ICD-10-CM

## 2022-12-06 DIAGNOSIS — S22.32XA CLOSED FRACTURE OF ONE RIB OF LEFT SIDE, INITIAL ENCOUNTER: ICD-10-CM

## 2022-12-06 LAB
A/G RATIO: 1.2 (ref 1.1–2.2)
ALBUMIN SERPL-MCNC: 4.4 G/DL (ref 3.4–5)
ALP BLD-CCNC: 126 U/L (ref 40–129)
ALT SERPL-CCNC: 6 U/L (ref 10–40)
ANION GAP SERPL CALCULATED.3IONS-SCNC: 14 MMOL/L (ref 3–16)
AST SERPL-CCNC: 12 U/L (ref 15–37)
BACTERIA: NORMAL /HPF
BASOPHILS ABSOLUTE: 0.1 K/UL (ref 0–0.2)
BASOPHILS RELATIVE PERCENT: 0.7 %
BILIRUB SERPL-MCNC: 0.3 MG/DL (ref 0–1)
BILIRUBIN URINE: NEGATIVE
BLOOD, URINE: NEGATIVE
BUN BLDV-MCNC: 36 MG/DL (ref 7–20)
CALCIUM SERPL-MCNC: 10.2 MG/DL (ref 8.3–10.6)
CHLORIDE BLD-SCNC: 101 MMOL/L (ref 99–110)
CLARITY: CLEAR
CO2: 23 MMOL/L (ref 21–32)
COLOR: YELLOW
CREAT SERPL-MCNC: 2 MG/DL (ref 0.6–1.2)
EOSINOPHILS ABSOLUTE: 0 K/UL (ref 0–0.6)
EOSINOPHILS RELATIVE PERCENT: 0.3 %
EPITHELIAL CELLS, UA: 1 /HPF (ref 0–5)
GFR SERPL CREATININE-BSD FRML MDRD: 24 ML/MIN/{1.73_M2}
GLUCOSE BLD-MCNC: 322 MG/DL (ref 70–99)
GLUCOSE URINE: 500 MG/DL
HCT VFR BLD CALC: 45.7 % (ref 36–48)
HEMOGLOBIN: 14.4 G/DL (ref 12–16)
HYALINE CASTS: 1 /LPF (ref 0–8)
KETONES, URINE: NEGATIVE MG/DL
LEUKOCYTE ESTERASE, URINE: ABNORMAL
LIPASE: 30 U/L (ref 13–60)
LYMPHOCYTES ABSOLUTE: 0.9 K/UL (ref 1–5.1)
LYMPHOCYTES RELATIVE PERCENT: 11.4 %
MCH RBC QN AUTO: 27.9 PG (ref 26–34)
MCHC RBC AUTO-ENTMCNC: 31.5 G/DL (ref 31–36)
MCV RBC AUTO: 88.4 FL (ref 80–100)
MICROSCOPIC EXAMINATION: YES
MONOCYTES ABSOLUTE: 0.5 K/UL (ref 0–1.3)
MONOCYTES RELATIVE PERCENT: 6.7 %
NEUTROPHILS ABSOLUTE: 6.3 K/UL (ref 1.7–7.7)
NEUTROPHILS RELATIVE PERCENT: 80.9 %
NITRITE, URINE: NEGATIVE
PDW BLD-RTO: 16.6 % (ref 12.4–15.4)
PH UA: 5 (ref 5–8)
PLATELET # BLD: 243 K/UL (ref 135–450)
PMV BLD AUTO: 9.6 FL (ref 5–10.5)
POTASSIUM REFLEX MAGNESIUM: 4.2 MMOL/L (ref 3.5–5.1)
PROTEIN UA: NEGATIVE MG/DL
RBC # BLD: 5.17 M/UL (ref 4–5.2)
RBC UA: 0 /HPF (ref 0–4)
SODIUM BLD-SCNC: 138 MMOL/L (ref 136–145)
SPECIFIC GRAVITY UA: 1.01 (ref 1–1.03)
TOTAL PROTEIN: 8.1 G/DL (ref 6.4–8.2)
URINE REFLEX TO CULTURE: ABNORMAL
URINE TYPE: ABNORMAL
UROBILINOGEN, URINE: 0.2 E.U./DL
WBC # BLD: 7.8 K/UL (ref 4–11)
WBC UA: 2 /HPF (ref 0–5)

## 2022-12-06 PROCEDURE — 70450 CT HEAD/BRAIN W/O DYE: CPT

## 2022-12-06 PROCEDURE — 85025 COMPLETE CBC W/AUTO DIFF WBC: CPT

## 2022-12-06 PROCEDURE — 3209999900 CT LUMBAR SPINE TRAUMA RECONSTRUCTION

## 2022-12-06 PROCEDURE — 74176 CT ABD & PELVIS W/O CONTRAST: CPT

## 2022-12-06 PROCEDURE — 83690 ASSAY OF LIPASE: CPT

## 2022-12-06 PROCEDURE — 93005 ELECTROCARDIOGRAM TRACING: CPT | Performed by: EMERGENCY MEDICINE

## 2022-12-06 PROCEDURE — 36415 COLL VENOUS BLD VENIPUNCTURE: CPT

## 2022-12-06 PROCEDURE — 99284 EMERGENCY DEPT VISIT MOD MDM: CPT

## 2022-12-06 PROCEDURE — 81001 URINALYSIS AUTO W/SCOPE: CPT

## 2022-12-06 PROCEDURE — 6370000000 HC RX 637 (ALT 250 FOR IP): Performed by: EMERGENCY MEDICINE

## 2022-12-06 PROCEDURE — 80053 COMPREHEN METABOLIC PANEL: CPT

## 2022-12-06 RX ORDER — MORPHINE SULFATE 15 MG/1
7.5 TABLET ORAL EVERY 6 HOURS PRN
Qty: 10 TABLET | Refills: 0 | Status: SHIPPED | OUTPATIENT
Start: 2022-12-06 | End: 2022-12-11

## 2022-12-06 RX ORDER — OXYCODONE HYDROCHLORIDE AND ACETAMINOPHEN 5; 325 MG/1; MG/1
0.5 TABLET ORAL ONCE
Status: COMPLETED | OUTPATIENT
Start: 2022-12-06 | End: 2022-12-06

## 2022-12-06 RX ADMIN — OXYCODONE AND ACETAMINOPHEN 0.5 TABLET: 5; 325 TABLET ORAL at 17:35

## 2022-12-06 ASSESSMENT — PAIN SCALES - GENERAL
PAINLEVEL_OUTOF10: 10
PAINLEVEL_OUTOF10: 10

## 2022-12-06 ASSESSMENT — LIFESTYLE VARIABLES: HOW OFTEN DO YOU HAVE A DRINK CONTAINING ALCOHOL: NEVER

## 2022-12-06 ASSESSMENT — PAIN - FUNCTIONAL ASSESSMENT: PAIN_FUNCTIONAL_ASSESSMENT: 0-10

## 2022-12-06 ASSESSMENT — PAIN DESCRIPTION - LOCATION: LOCATION: BACK

## 2022-12-06 NOTE — DISCHARGE INSTRUCTIONS
Please return if you have any new, worsening, or concerning symptoms like inability to eat/drink/walk, fevers. Contact your primary care physician tomorrow to make a follow up appointment this week. You must discuss this visit and any labwork/imaging, since there were ncidental findings.

## 2022-12-06 NOTE — ED PROVIDER NOTES
[address] PROVIDER NOTE    Patient Identification  Pt Name: Emmanuelle David  MRN: [id number]  [birth year]ms[birth year]ionegfgabbie 1940  Date of evaluation: 12/6/2022  Provider: Peyton Zhu MD  PCP: Adeola Stovall MD    Chief Complaint  Back pain    HPI  History provided by patient   This is a 80 y.o. female who presents to the ED for back pain. Left lower back. No midline or right-sided pain. No abdominal pain. Started 3 days ago after she fell and hit the windowsill edge. To clarify, she did not fall out of the window. She is unsure why she fell. She thinks she may have been dizzy. She is no longer dizzy. Her pain is mild right now but worse with movement/palpation. She declines pain medications as long as she is holding still. No fevers or chills or cough. No chest pain or shortness of breath. No pain in her arms or legs. No midline spinal pain or neck pain. No numbness/tingling. She lives at home and is ambulating at her baseline    ROS  12 systems reviewed, pertinent positives/negatives per HPI otherwise noted to be negative.     I have reviewed the following nursing documentation:  Allergies: Metformin and related    Past medical history:   Past Medical History:   Diagnosis Date    Cataract     Cystocele without mention of uterine prolapse     Depression     DM type 2 (diabetes mellitus, type 2) ([address]     Dysuria     Fatty infiltration of liver 12/1/2022    Generalized osteoarthritis of multiple sites     HTN (hypertension)     Hyperlipidemia     Menopause     Nephropathy, diabetic ([facility] [address]     Osteopenia     Stroke [facility])      Past surgical history:   Past Surgical History:   Procedure Laterality Date    CARPAL TUNNEL RELEASE  2000    Right     CATARACT REMOVAL  2006    Bilateral Cataracts       Home medications:   Discharge Medication List as of 12/6/2022  5:41 PM        CONTINUE these medications which have NOT CHANGED    Details   JANUVIA 25 MG tablet TAKE 1 TABLET BY MOUTH  DAILY, Disp-60 tablet, R-5Requesting 1 year supplyNormal      insulin lispro protamine & lispro (HUMALOG MIX 75/25 KWIKPEN) (75-25) 100 UNIT per ML SUPN injection pen 80 U before breakfast. Before dinner: Glucose < 70  none;  4 U; 101-150 10 U; 151-200 15 U; 201-250 20 U; 251-300 25 U; 301-350 18 U; > 350 30 U, Disp-25 Adjustable Dose Pre-filled Pen Syringe, R-3UPDATED DIRECTIONSNormal      furosemide (LASIX) 40 MG tablet TAKE 1 TABLET BY MOUTH  TWICE DAILY, Disp-180 tablet, R-3Requesting 1 year supplyNormal      atorvastatin (LIPITOR) 40 MG tablet TAKE 1 TABLET BY MOUTH ONCE DAILY, Disp-90 tablet, R-3Requesting 1 year supplyNormal      Continuous Blood Gluc  (DEXCOM G6 ) GREGORIO As needed for diabetes, Disp-1 each, R-0Normal      vitamin B-12 (CYANOCOBALAMIN) 1000 MCG tablet TAKE 1 TABLET BY MOUTH EVERY DAY, Disp-30 tablet, R-5Normal      quinapril (ACCUPRIL) 40 MG tablet TAKE 1 TABLET BY MOUTH ONCE DAILY, Disp-30 tablet, R-11Requesting 1 year supplyNormal      clopidogrel (PLAVIX) 75 MG tablet TAKE 1 TABLET BY MOUTH  DAILY, Disp-30 tablet, R-11Requesting 1 year supplyNormal      blood glucose test strips (ONETOUCH VERIO) strip USE TO TEST BLOOD SUGAR FOUR TIMES A DAY AND AS NEEDED FOR SYMPTOMS OF IRREGULAR BLOOD GLUCOSE, Disp-400 strip, R-5Normal      pantoprazole (PROTONIX) 40 MG tablet TAKE 1 TABLET BY MOUTH  DAILY, Disp-90 tablet, R-3Normal      amLODIPine (NORVASC) 10 MG tablet TAKE 1 TABLET BY MOUTH  DAILY, Disp-90 tablet, R-3Requesting 1 year supplyNormal      FLUoxetine (PROZAC) 40 MG capsule TAKE 1 CAPSULE BY MOUTH  ONCE DAILY, Disp-90 capsule, R-3Requesting 1 year supplyNormal      Insulin Pen Needle (TRUEPLUS PEN NEEDLES) 31G X 6 MM MISC Use to check blood sugar six times per day, Disp-400 each, R-3Normal      blood glucose monitor kit and supplies Test 2 times a day & as needed for symptoms of irregular blood glucose., Disp-1 kit,R-0, Normal      Lancets MISC 4 TIMES DAILY Starting Thu 12/10/2020, Disp-400 each,R-3, Normal      Blood Glucose Monitoring Suppl (ACCU-CHEK WALTER PLUS) w/Device KIT Disp-1 kit, R-0, NormalUSE TO TEST BLOOD SUGAR THREE TIMES DAILY Dx Code: E11.21      aspirin 325 MG tablet Take 1 tablet by mouth daily. , Disp-30 tablet, R-0             Social history:  reports that she quit smoking about 20 years ago. Her smoking use included cigarettes. She has a 7.50 pack-year smoking history. She has never used smokeless tobacco. She reports that she does not drink alcohol and does not use drugs. Family history:    Family History   Problem Relation Age of Onset    Stroke Father     Mental Illness Brother         schizophrenia    Cancer Brother         Lung Cancer         Exam  ED Triage Vitals [12/06/22 1448]   BP Temp Temp src Heart Rate Resp SpO2 Height Weight   (!) 185/60 98 °F (36.7 °C) -- 88 20 95 % -- 196 lb (88.9 kg)     Nursing note and vitals reviewed. Constitutional: In no acute distress  HENT:      Head: Normocephalic      Ears: External ears normal.      Nose: Nose normal.     Mouth: Membrane mucosa moist   Eyes: No discharge. Neck: Supple. Trachea midline. Cardiovascular: Regular rate. Warm extremities  Pulmonary/Chest: Effort normal. No respiratory distress. Abdominal: Soft. No distension. Nontender  : Deferred  Rectal: Deferred   Musculoskeletal: Moves all extremities. No gross deformity. Nontender to palpation over neck, spine, upper back, right lower back. She is tender over left lower back. No left flank or abdominal tenderness. No tenderness over chest, arms, legs  Neurological: Alert and oriented. Face symmetric. Speech is clear. Cranial nerves II to XII intact  Skin: Warm and dry. Psychiatric: Normal mood and affect. Behavior is normal.    Procedures      EKG  The Ekg interpreted by me in the absence of a cardiologist shows. Normal sinus rhythm. No specific ST changes appreciated.   HR 84  No significant change from prior EKG dated 9/20      Radiology  CT LUMBAR SPINE TRAUMA RECONSTRUCTION   Preliminary Result   1. Transverse process fracture can be seen involving left L1 which is likely   acute. 2. No acute compression injury. 3. Mild multilevel degenerative disc disease with the exception of L4-L5. Minimal anterolisthesis of L4 over L5, which is likely degenerative. 4. At L4-L5, broad-based disc bulge and bilateral facet arthropathy is   associated with moderate to severe central stenosis. CT ABDOMEN PELVIS WO CONTRAST Additional Contrast? None   Final Result   1. Minimally displaced fracture of the left transverse process of the L1   vertebral body and minimally displaced fracture of the posterolateral aspect   of the left 11th rib. 2.  1 cm left adrenal nodule with imaging characteristics most likely   representing an adenoma. 1 year follow-up adrenal washout CT is recommended. 3.  Cholelithiasis without evidence of acute cholecystitis. 4.  Mild sigmoid colon diverticulosis without evidence of diverticulitis         CT HEAD WO CONTRAST   Final Result   No acute intracranial abnormality. Chronic infarcts as above. Labs      Screenings   Alan Coma Scale  Eye Opening: Spontaneous  Best Verbal Response: Oriented  Best Motor Response: Obeys commands  Husser Coma Scale Score: 15       MDM and ED Course  This is a 80 y.o. female who presents to the ED for fall onto a windowsill hitting her left lower back. Obtaining head CT to evaluate for intracranial bleed. No focal neurodeficits. Complete that she has stroke. No headache. Low suspicion for intracranial bleed. Fulfills Nexus criteria. Believe she is at low risk for C-spine injury. No focal neurodeficits, altered mental status, intoxication, distracting injury, midline tenderness. She has no neck pain. Obtaining CT abdomen pelvis and L-spine. No other traumatic signs found.   While ideal study would be with contrast, patient has history of chronic kidney disease and I feel that risk of contrast-induced nephropathy outweighs benefit at this time. No midline back pain. I do not believe that there is any signs of cauda equina/spinal epidural abscess/spinal hematoma or cord compressive injury    ---------------    Patient found to have single rib fracture on the left side with no evidence pneumothorax or hypoxia. Pain is under control. Given incentive spirometer. Also has single transverse process fracture of L1. No focal neurodeficits. There are some signs of degenerative disc disease with disc bulge. She has normal strength and sensation on bilateral lower extremities and is walking at baseline. Wishes to go home at this time. Discharged with all questions answered and return precautions given    [unfilled]    Is this patient to be included in the SEP-1 Core Measure due to severe sepsis or septic shock? No   Exclusion criteria - the patient is NOT to be included for SEP-1 Core Measure due to:  2+ SIRS criteria are not met        Final Impression  1. Fall, initial encounter    2. Closed fracture of one rib of left side, initial encounter    3. Closed fracture of transverse process of lumbar vertebra, initial encounter ([facility] [address]        Blood pressure (!) 156/76, pulse 85, temperature 98 °F (36.7 °C), resp. rate 18, weight 196 lb (88.9 kg), SpO2 95 %, not currently breastfeeding. Disposition:  DISPOSITION Decision To Discharge 12/06/2022 05:10:43 PM      Patient Referrals:  Moi Basilio MD  [address] [place] [address]  [phone]    In 1 day      Discharge Medications:  Discharge Medication List as of 12/6/2022  5:41 PM        START taking these medications    Details   morphine (MSIR) 15 MG tablet Take 0.5 tablets by mouth every 6 hours as needed for Pain for up to 5 days. , Disp-10 tablet, R-0Print             Discontinued Medications:  Discharge Medication List as of 12/6/2022  5:41 PM This chart was generated using the [address] [place], [place] [address]  dictation system. I created this record but it may contain dictation errors given the limitations of this technology.         Joaquim Chu MD  12/06/22 3178

## 2022-12-07 LAB
EKG ATRIAL RATE: 84 BPM
EKG DIAGNOSIS: NORMAL
EKG P AXIS: 54 DEGREES
EKG P-R INTERVAL: 172 MS
EKG Q-T INTERVAL: 386 MS
EKG QRS DURATION: 80 MS
EKG QTC CALCULATION (BAZETT): 456 MS
EKG R AXIS: 4 DEGREES
EKG T AXIS: 42 DEGREES
EKG VENTRICULAR RATE: 84 BPM

## 2022-12-07 PROCEDURE — 93010 ELECTROCARDIOGRAM REPORT: CPT | Performed by: INTERNAL MEDICINE

## 2022-12-22 ENCOUNTER — OFFICE VISIT (OUTPATIENT)
Dept: FAMILY MEDICINE CLINIC | Age: 82
End: 2022-12-22

## 2022-12-22 VITALS
BODY MASS INDEX: 34.2 KG/M2 | WEIGHT: 193 LBS | OXYGEN SATURATION: 94 % | SYSTOLIC BLOOD PRESSURE: 126 MMHG | HEART RATE: 58 BPM | DIASTOLIC BLOOD PRESSURE: 60 MMHG | HEIGHT: 63 IN

## 2022-12-22 DIAGNOSIS — I10 ESSENTIAL HYPERTENSION: ICD-10-CM

## 2022-12-22 DIAGNOSIS — E11.65 UNCONTROLLED TYPE 2 DIABETES MELLITUS WITH HYPERGLYCEMIA (HCC): Primary | ICD-10-CM

## 2022-12-22 DIAGNOSIS — N18.4 CKD (CHRONIC KIDNEY DISEASE) STAGE 4, GFR 15-29 ML/MIN (HCC): ICD-10-CM

## 2022-12-22 DIAGNOSIS — E11.21 TYPE 2 DIABETES MELLITUS WITH DIABETIC NEPHROPATHY, WITH LONG-TERM CURRENT USE OF INSULIN (HCC): ICD-10-CM

## 2022-12-22 DIAGNOSIS — Z79.4 TYPE 2 DIABETES MELLITUS WITH DIABETIC NEPHROPATHY, WITH LONG-TERM CURRENT USE OF INSULIN (HCC): ICD-10-CM

## 2022-12-22 PROBLEM — N18.30 CHRONIC RENAL DISEASE, STAGE 3, MODERATELY DECREASED GLOMERULAR FILTRATION RATE (GFR) BETWEEN 30-59 ML/MIN/1.73 SQUARE METER (HCC): Status: RESOLVED | Noted: 2018-12-05 | Resolved: 2022-12-22

## 2022-12-22 PROBLEM — N18.30 CHRONIC RENAL DISEASE, STAGE III (HCC): Status: RESOLVED | Noted: 2022-05-12 | Resolved: 2022-12-22

## 2022-12-22 LAB — HBA1C MFR BLD: 10.3 %

## 2022-12-22 NOTE — PROGRESS NOTES
CHRONIC CONDITION FOLLOW-UP     Assessment and Plan:      Diagnosis Orders   1. Uncontrolled type 2 diabetes mellitus with hyperglycemia ([facility] [address]        2. Type 2 diabetes mellitus with diabetic nephropathy, with long-term current use of insulin ([facility])  SITagliptin (JANUVIA) 25 MG tablet    POCT glycosylated hemoglobin (Hb A1C)      3. Essential hypertension        4. CKD (chronic kidney disease) stage 4, GFR 15-29 ml/min ([facility])        WORSE     Continue current Tx plan. Any changes marked below. INSTRUCTIONS  NEXT APPOINTMENT: Please schedule check-up in 3 months with Dr. Aaron Freeman or her NP, Liana Cole. PLEASE TAKE THIS FORM TO CHECK-OUT WINDOW TO SCHEDULE NEXT VISIT. Check sugar, take insulin and then eat breakfast and dinner. Take 85 U before breakfast. Take sliding scale before dinner. Let me know if sugars running high 2 hours after dinner. Subjective:      Chief Complaint   Patient presents with    Diabetes     3 mo f/u DM  Needs Januvia resent to mail service for 90 days with refill - pended med     Rosita Fernando is an 80 y.o. female who presents for follow up    Complaints:   A1c over 10    CHART REVIEW   reports that she quit smoking about 20 years ago. Her smoking use included cigarettes. She has a 7.50 pack-year smoking history. She has never used smokeless tobacco.  Health Maintenance Due   Topic Date Due    Shingles vaccine (1 of 2) 02/06/2017    DTaP/Tdap/Td vaccine (2 - Td or Tdap) 07/15/2020    COVID-19 Vaccine (3 - Booster for Pfizer series) 08/30/2021     Current Outpatient Medications   Medication Instructions    amLODIPine (NORVASC) 10 mg, Oral, DAILY    aspirin 325 mg, Oral, DAILY    atorvastatin (LIPITOR) 40 MG tablet TAKE 1 TABLET BY MOUTH ONCE DAILY    blood glucose monitor kit and supplies Test 2 times a day & as needed for symptoms of irregular blood glucose.     Blood Glucose Monitoring Suppl (ACCU-CHEK WALTER PLUS) w/Device KIT USE TO TEST BLOOD SUGAR THREE TIMES DAILY Dx Code: E11.21    blood glucose test strips (ONETOUCH VERIO) strip USE TO TEST BLOOD SUGAR FOUR TIMES A DAY AND AS NEEDED FOR SYMPTOMS OF IRREGULAR BLOOD GLUCOSE    clopidogrel (PLAVIX) 75 MG tablet TAKE 1 TABLET BY MOUTH  DAILY    Continuous Blood Gluc  (DEXCOM G6 ) GREGORIO As needed for diabetes    FLUoxetine (PROZAC) 40 MG capsule TAKE 1 CAPSULE BY MOUTH  ONCE DAILY    furosemide (LASIX) 40 MG tablet TAKE 1 TABLET BY MOUTH  TWICE DAILY    insulin lispro protamine & lispro (HUMALOG MIX 75/25 KWIKPEN) (75-25) 100 UNIT per ML SUPN injection pen 80 U before breakfast. Before dinner: Glucose < 70  none;  4 U; 101-150 10 U; 151-200 15 U; 201-250 20 U; 251-300 25 U; 301-350 18 U; > 350 30 U    Insulin Pen Needle (TRUEPLUS PEN NEEDLES) 31G X 6 MM MISC Use to check blood sugar six times per day    Lancets MISC 1 each, Does not apply, 4 TIMES DAILY    pantoprazole (PROTONIX) 40 mg, Oral, DAILY    quinapril (ACCUPRIL) 40 MG tablet TAKE 1 TABLET BY MOUTH ONCE DAILY    SITagliptin (JANUVIA) 25 MG tablet TAKE 1 TABLET BY MOUTH  DAILY    vitamin B-12 (CYANOCOBALAMIN) 1000 MCG tablet TAKE 1 TABLET BY MOUTH EVERY DAY     LAST LABS  Lab Results   Component Value Date    LDLCALC 74 05/12/2022     Lab Results   Component Value Date    HDL 68 (H) 05/12/2022     Lab Results   Component Value Date    TRIG 111 05/12/2022     Lab Results   Component Value Date    ALT 6 (L) 12/06/2022    AST 12 (L) 12/06/2022    ALKPHOS 126 12/06/2022    BILITOT 0.3 12/06/2022     Lab Results   Component Value Date     12/06/2022    K 4.2 12/06/2022    CREATININE 2.0 (H) 12/06/2022     Lab Results   Component Value Date    LABGLOM 24 (A) 12/06/2022    LABGLOM 23 (A) 11/23/2022    LABGLOM 22 (A) 11/15/2022     Lab Results   Component Value Date    WBC 7.8 12/06/2022    HGB 14.4 12/06/2022     12/06/2022     TSH (uIU/mL)   Date Value   09/03/2020 2.02     Lab Results   Component Value Date    GLUCOSE 322 (H) 12/06/2022     Lab Results   Component Value Date    LABA1C 8.8 09/22/2022    LABA1C 10.6 05/12/2022    LABA1C 8.4 11/22/2021     Objective:   PHYSICAL EXAM   /60 (Site: Left Upper Arm, Position: Sitting, Cuff Size: Large Adult)   Pulse 58   Ht 5' 3\" (1.6 m)   Wt 193 lb (87.5 kg)   LMP  (LMP Unknown)   SpO2 94%   BMI 34.19 kg/m²   BP Readings from Last 5 Encounters:   12/22/22 126/60   12/06/22 (!) 156/76   11/23/22 134/60   11/15/22 (!) 152/76   10/25/22 135/76     Wt Readings from Last 5 Encounters:   12/22/22 193 lb (87.5 kg)   12/06/22 196 lb (88.9 kg)   11/23/22 196 lb (88.9 kg)   11/15/22 195 lb 6.4 oz (88.6 kg)   09/22/22 197 lb (89.4 kg)      GENERAL:   well-developed, well-nourished, alert, no distress.      LUNGS:    Breathing unlabored  clear to auscultation bilaterally and good air movement  CARDIOVASC:   regular rate and rhythm  SKIN: warm and dry

## 2022-12-22 NOTE — PATIENT INSTRUCTIONS
INSTRUCTIONS  NEXT APPOINTMENT: Please schedule check-up in 3 months with Dr. Srinivasa Chicas or her NP, Castillo Spivey. PLEASE TAKE THIS FORM TO CHECK-OUT WINDOW TO SCHEDULE NEXT VISIT. Check sugar, take insulin and then eat breakfast and dinner. Take 85 U before breakfast. Take sliding scale before dinner. Let me know if sugars running high 2 hours after dinner in 3 weeks.

## 2022-12-30 DIAGNOSIS — F32.5 MAJOR DEPRESSIVE DISORDER WITH SINGLE EPISODE, IN FULL REMISSION (HCC): ICD-10-CM

## 2022-12-30 DIAGNOSIS — K21.9 GASTROESOPHAGEAL REFLUX DISEASE WITHOUT ESOPHAGITIS: ICD-10-CM

## 2022-12-30 DIAGNOSIS — E78.2 MIXED HYPERLIPIDEMIA: ICD-10-CM

## 2023-01-03 RX ORDER — PANTOPRAZOLE SODIUM 40 MG/1
40 TABLET, DELAYED RELEASE ORAL DAILY
Qty: 90 TABLET | Refills: 3 | Status: SHIPPED | OUTPATIENT
Start: 2023-01-03

## 2023-01-03 RX ORDER — FLUOXETINE HYDROCHLORIDE 40 MG/1
CAPSULE ORAL
Qty: 90 CAPSULE | Refills: 3 | Status: SHIPPED | OUTPATIENT
Start: 2023-01-03

## 2023-01-03 RX ORDER — AMLODIPINE BESYLATE 10 MG/1
10 TABLET ORAL DAILY
Qty: 90 TABLET | Refills: 3 | Status: SHIPPED | OUTPATIENT
Start: 2023-01-03

## 2023-02-03 DIAGNOSIS — N18.4 CKD (CHRONIC KIDNEY DISEASE), STAGE IV (HCC): ICD-10-CM

## 2023-02-03 DIAGNOSIS — E11.21 DIABETIC NEPHROPATHY ASSOCIATED WITH TYPE 2 DIABETES MELLITUS (HCC): ICD-10-CM

## 2023-02-03 DIAGNOSIS — I10 ESSENTIAL HYPERTENSION: ICD-10-CM

## 2023-02-03 LAB
ANION GAP SERPL CALCULATED.3IONS-SCNC: 12 MMOL/L (ref 3–16)
BUN BLDV-MCNC: 31 MG/DL (ref 7–20)
CALCIUM SERPL-MCNC: 9.6 MG/DL (ref 8.3–10.6)
CHLORIDE BLD-SCNC: 107 MMOL/L (ref 99–110)
CO2: 26 MMOL/L (ref 21–32)
CREAT SERPL-MCNC: 1.8 MG/DL (ref 0.6–1.2)
CREATININE URINE: 93.2 MG/DL (ref 28–259)
GFR SERPL CREATININE-BSD FRML MDRD: 28 ML/MIN/{1.73_M2}
GLUCOSE BLD-MCNC: 102 MG/DL (ref 70–99)
POTASSIUM SERPL-SCNC: 4.9 MMOL/L (ref 3.5–5.1)
PROTEIN PROTEIN: 13 MG/DL
PROTEIN/CREAT RATIO: 0.1 MG/DL
SODIUM BLD-SCNC: 145 MMOL/L (ref 136–145)

## 2023-02-20 ENCOUNTER — TELEPHONE (OUTPATIENT)
Dept: FAMILY MEDICINE CLINIC | Age: 83
End: 2023-02-20

## 2023-02-20 DIAGNOSIS — Z79.4 TYPE 2 DIABETES MELLITUS WITH DIABETIC NEPHROPATHY, WITH LONG-TERM CURRENT USE OF INSULIN (HCC): Primary | ICD-10-CM

## 2023-02-20 DIAGNOSIS — E11.21 TYPE 2 DIABETES MELLITUS WITH DIABETIC NEPHROPATHY, WITH LONG-TERM CURRENT USE OF INSULIN (HCC): Primary | ICD-10-CM

## 2023-02-20 RX ORDER — FLASH GLUCOSE SENSOR
6 KIT MISCELLANEOUS DAILY
Qty: 7 EACH | Refills: 0 | Status: SHIPPED | OUTPATIENT
Start: 2023-02-20

## 2023-02-20 RX ORDER — FLASH GLUCOSE SCANNING READER
1 EACH MISCELLANEOUS DAILY
Qty: 1 EACH | Refills: 0 | Status: SHIPPED | OUTPATIENT
Start: 2023-02-20

## 2023-03-23 RX ORDER — BLOOD SUGAR DIAGNOSTIC
STRIP MISCELLANEOUS
Qty: 400 STRIP | Refills: 3 | Status: SHIPPED | OUTPATIENT
Start: 2023-03-23

## 2023-03-24 DIAGNOSIS — I10 ESSENTIAL HYPERTENSION: ICD-10-CM

## 2023-03-24 DIAGNOSIS — E11.21 TYPE 2 DIABETES MELLITUS WITH DIABETIC NEPHROPATHY, WITH LONG-TERM CURRENT USE OF INSULIN (HCC): ICD-10-CM

## 2023-03-24 DIAGNOSIS — Z79.4 TYPE 2 DIABETES MELLITUS WITH DIABETIC NEPHROPATHY, WITH LONG-TERM CURRENT USE OF INSULIN (HCC): ICD-10-CM

## 2023-03-24 DIAGNOSIS — I67.9 CEREBRAL VASCULAR DISEASE: ICD-10-CM

## 2023-03-24 DIAGNOSIS — E53.8 B12 DEFICIENCY: ICD-10-CM

## 2023-03-24 DIAGNOSIS — I65.21 STENOSIS OF RIGHT CAROTID ARTERY: ICD-10-CM

## 2023-03-24 RX ORDER — CLOPIDOGREL BISULFATE 75 MG/1
TABLET ORAL
Qty: 90 TABLET | Refills: 3 | Status: SHIPPED | OUTPATIENT
Start: 2023-03-24

## 2023-03-24 RX ORDER — LANOLIN ALCOHOL/MO/W.PET/CERES
1000 CREAM (GRAM) TOPICAL DAILY
Qty: 90 TABLET | Refills: 1 | Status: SHIPPED | OUTPATIENT
Start: 2023-03-24

## 2023-03-27 RX ORDER — QUINAPRIL 40 MG/1
40 TABLET ORAL DAILY
Qty: 90 TABLET | Refills: 1 | Status: SHIPPED | OUTPATIENT
Start: 2023-03-27

## 2023-04-17 ENCOUNTER — OFFICE VISIT (OUTPATIENT)
Dept: FAMILY MEDICINE CLINIC | Age: 83
End: 2023-04-17
Payer: MEDICARE

## 2023-04-17 VITALS
DIASTOLIC BLOOD PRESSURE: 70 MMHG | WEIGHT: 196 LBS | HEART RATE: 73 BPM | OXYGEN SATURATION: 94 % | HEIGHT: 63 IN | SYSTOLIC BLOOD PRESSURE: 140 MMHG | BODY MASS INDEX: 34.73 KG/M2

## 2023-04-17 DIAGNOSIS — Z79.4 TYPE 2 DIABETES MELLITUS WITH DIABETIC NEPHROPATHY, WITH LONG-TERM CURRENT USE OF INSULIN (HCC): ICD-10-CM

## 2023-04-17 DIAGNOSIS — E11.21 TYPE 2 DIABETES MELLITUS WITH DIABETIC NEPHROPATHY, WITH LONG-TERM CURRENT USE OF INSULIN (HCC): ICD-10-CM

## 2023-04-17 DIAGNOSIS — I10 ESSENTIAL HYPERTENSION: ICD-10-CM

## 2023-04-17 DIAGNOSIS — E11.65 UNCONTROLLED TYPE 2 DIABETES MELLITUS WITH HYPERGLYCEMIA (HCC): Primary | ICD-10-CM

## 2023-04-17 DIAGNOSIS — L57.0 ACTINIC KERATOSIS: ICD-10-CM

## 2023-04-17 DIAGNOSIS — F32.5 MAJOR DEPRESSIVE DISORDER WITH SINGLE EPISODE, IN FULL REMISSION (HCC): ICD-10-CM

## 2023-04-17 LAB — HBA1C MFR BLD: 9.3 %

## 2023-04-17 PROCEDURE — 99214 OFFICE O/P EST MOD 30 MIN: CPT | Performed by: FAMILY MEDICINE

## 2023-04-17 PROCEDURE — 1036F TOBACCO NON-USER: CPT | Performed by: FAMILY MEDICINE

## 2023-04-17 PROCEDURE — 3046F HEMOGLOBIN A1C LEVEL >9.0%: CPT | Performed by: FAMILY MEDICINE

## 2023-04-17 PROCEDURE — 1090F PRES/ABSN URINE INCON ASSESS: CPT | Performed by: FAMILY MEDICINE

## 2023-04-17 PROCEDURE — G8417 CALC BMI ABV UP PARAM F/U: HCPCS | Performed by: FAMILY MEDICINE

## 2023-04-17 PROCEDURE — 83036 HEMOGLOBIN GLYCOSYLATED A1C: CPT | Performed by: FAMILY MEDICINE

## 2023-04-17 PROCEDURE — G8427 DOCREV CUR MEDS BY ELIG CLIN: HCPCS | Performed by: FAMILY MEDICINE

## 2023-04-17 PROCEDURE — G8399 PT W/DXA RESULTS DOCUMENT: HCPCS | Performed by: FAMILY MEDICINE

## 2023-04-17 PROCEDURE — 3078F DIAST BP <80 MM HG: CPT | Performed by: FAMILY MEDICINE

## 2023-04-17 PROCEDURE — 1123F ACP DISCUSS/DSCN MKR DOCD: CPT | Performed by: FAMILY MEDICINE

## 2023-04-17 PROCEDURE — 3077F SYST BP >= 140 MM HG: CPT | Performed by: FAMILY MEDICINE

## 2023-04-17 RX ORDER — INSULIN LISPRO 100 [IU]/ML
INJECTION, SUSPENSION SUBCUTANEOUS
Qty: 25 ADJUSTABLE DOSE PRE-FILLED PEN SYRINGE | Refills: 3 | Status: SHIPPED | OUTPATIENT
Start: 2023-04-17

## 2023-04-17 ASSESSMENT — PATIENT HEALTH QUESTIONNAIRE - PHQ9
SUM OF ALL RESPONSES TO PHQ9 QUESTIONS 1 & 2: 1
5. POOR APPETITE OR OVEREATING: 0
4. FEELING TIRED OR HAVING LITTLE ENERGY: 0
8. MOVING OR SPEAKING SO SLOWLY THAT OTHER PEOPLE COULD HAVE NOTICED. OR THE OPPOSITE, BEING SO FIGETY OR RESTLESS THAT YOU HAVE BEEN MOVING AROUND A LOT MORE THAN USUAL: 0
SUM OF ALL RESPONSES TO PHQ QUESTIONS 1-9: 1
SUM OF ALL RESPONSES TO PHQ QUESTIONS 1-9: 1
6. FEELING BAD ABOUT YOURSELF - OR THAT YOU ARE A FAILURE OR HAVE LET YOURSELF OR YOUR FAMILY DOWN: 0
7. TROUBLE CONCENTRATING ON THINGS, SUCH AS READING THE NEWSPAPER OR WATCHING TELEVISION: 0
1. LITTLE INTEREST OR PLEASURE IN DOING THINGS: 0
SUM OF ALL RESPONSES TO PHQ QUESTIONS 1-9: 1
SUM OF ALL RESPONSES TO PHQ QUESTIONS 1-9: 1
9. THOUGHTS THAT YOU WOULD BE BETTER OFF DEAD, OR OF HURTING YOURSELF: 0
2. FEELING DOWN, DEPRESSED OR HOPELESS: 1
3. TROUBLE FALLING OR STAYING ASLEEP: 0
10. IF YOU CHECKED OFF ANY PROBLEMS, HOW DIFFICULT HAVE THESE PROBLEMS MADE IT FOR YOU TO DO YOUR WORK, TAKE CARE OF THINGS AT HOME, OR GET ALONG WITH OTHER PEOPLE: 1

## 2023-04-17 NOTE — PROGRESS NOTES
glucose.     Blood Glucose Monitoring Suppl (ACCU-CHEK WALTER PLUS) w/Device KIT USE TO TEST BLOOD SUGAR THREE TIMES DAILY Dx Code: E11.21    blood glucose test strips (ONETOUCH VERIO) strip USE TO TEST BLOOD SUGAR 4  TIMES DAILY AND AS NEEDED  FOR SYMPTOMS OF IRREGULAR  BLOOD SUGAR    clopidogrel (PLAVIX) 75 MG tablet TAKE 1 TABLET BY MOUTH  DAILY    Continuous Blood Gluc  (DEXCOM G6 ) GREGORIO As needed for diabetes    Continuous Blood Gluc  (FREESTYLE AVELINA 2 READER) GREGORIO USE AS DIRECTED DAILY    Continuous Blood Gluc Sensor (FREESTYLE AVELINA 2 SENSOR) MISC USE AS DIRECTED DAILY    FLUoxetine (PROZAC) 40 MG capsule TAKE 1 CAPSULE BY MOUTH  ONCE DAILY    furosemide (LASIX) 40 MG tablet TAKE 1 TABLET BY MOUTH  TWICE DAILY    insulin lispro protamine & lispro (HUMALOG MIX 75/25 KWIKPEN) (75-25) 100 UNIT per ML SUPN injection pen 85 U before breakfast. Before dinner: Glucose < 70  none;  4 U; 101-150 10 U; 151-200 15 U; 201-250 20 U; 251-300 25 U; 301-350 18 U; > 350 30 U    Insulin Pen Needle (TRUEPLUS PEN NEEDLES) 31G X 6 MM MISC Use to check blood sugar six times per day    Lancets MISC 1 each, Does not apply, 4 TIMES DAILY    pantoprazole (PROTONIX) 40 mg, Oral, DAILY    quinapril (ACCUPRIL) 40 mg, Oral, DAILY    SITagliptin (JANUVIA) 25 MG tablet TAKE 1 TABLET BY MOUTH  DAILY    vitamin B-12 (CYANOCOBALAMIN) 1,000 mcg, Oral, DAILY     LAST LABS  Lab Results   Component Value Date    LDLCALC 74 05/12/2022     Lab Results   Component Value Date    HDL 68 (H) 05/12/2022     Lab Results   Component Value Date    TRIG 111 05/12/2022     Lab Results   Component Value Date    ALT 6 (L) 12/06/2022    AST 12 (L) 12/06/2022    ALKPHOS 126 12/06/2022    BILITOT 0.3 12/06/2022     Lab Results   Component Value Date     02/03/2023    K 4.9 02/03/2023    CREATININE 1.8 (H) 02/03/2023     Lab Results   Component Value Date    LABGLOM 28 (A) 02/03/2023    LABGLOM 24 (A) 12/06/2022    LABGLOM 23 (A)

## 2023-04-17 NOTE — PATIENT INSTRUCTIONS
MA: Please recheck blood pressure and document in EPIC. Let me know if > 140/90 BEFORE they leave. Thanks! INSTRUCTIONS  NEXT APPOINTMENT: Please schedule check-up in 6 weeks with Dr. Alicia Maciel or her NP, Dusty Valerio. See dermatology to likely freeze the cheek spot.   Insulin 75/25 sliding scale: Glucose < 70  none;  4 U; 101-150 10 U; 151-200 15 U; 201-250 20 U; 251-300 25 U; 301-350 30 U; > 350 35 U

## 2023-04-24 ENCOUNTER — TELEPHONE (OUTPATIENT)
Dept: FAMILY MEDICINE CLINIC | Age: 83
End: 2023-04-24

## 2023-05-10 ENCOUNTER — PATIENT MESSAGE (OUTPATIENT)
Dept: FAMILY MEDICINE CLINIC | Age: 83
End: 2023-05-10

## 2023-05-11 NOTE — TELEPHONE ENCOUNTER
From: Nicky Cohen  To: Dr. Pretty King  Sent: 5/10/2023 5:17 PM EDT  Subject: [facility]    I am almost out of Humalog 75 25 kwikpen. I get this medication filled through the [facility]. They say they are missing the prescription details from my doctor and cannot finish processing the application. I have already contacted the office about this previously. I am almost out of insulin. Please help. Asap.  Thank you

## 2023-05-12 ENCOUNTER — TELEPHONE (OUTPATIENT)
Dept: FAMILY MEDICINE CLINIC | Age: 83
End: 2023-05-12

## 2023-05-12 DIAGNOSIS — Z79.4 TYPE 2 DIABETES MELLITUS WITH DIABETIC NEPHROPATHY, WITH LONG-TERM CURRENT USE OF INSULIN (HCC): ICD-10-CM

## 2023-05-12 DIAGNOSIS — E11.21 TYPE 2 DIABETES MELLITUS WITH DIABETIC NEPHROPATHY, WITH LONG-TERM CURRENT USE OF INSULIN (HCC): ICD-10-CM

## 2023-05-12 RX ORDER — INSULIN LISPRO 100 [IU]/ML
INJECTION, SUSPENSION SUBCUTANEOUS
Qty: 25 ADJUSTABLE DOSE PRE-FILLED PEN SYRINGE | Refills: 3 | Status: SHIPPED | OUTPATIENT
Start: 2023-05-12

## 2023-06-17 DIAGNOSIS — E78.2 MIXED HYPERLIPIDEMIA: ICD-10-CM

## 2023-06-19 RX ORDER — ATORVASTATIN CALCIUM 40 MG/1
TABLET, FILM COATED ORAL
Qty: 100 TABLET | Refills: 2 | Status: SHIPPED | OUTPATIENT
Start: 2023-06-19

## 2023-09-03 DIAGNOSIS — Z79.4 TYPE 2 DIABETES MELLITUS WITH DIABETIC NEPHROPATHY, WITH LONG-TERM CURRENT USE OF INSULIN (HCC): ICD-10-CM

## 2023-09-03 DIAGNOSIS — E11.21 TYPE 2 DIABETES MELLITUS WITH DIABETIC NEPHROPATHY, WITH LONG-TERM CURRENT USE OF INSULIN (HCC): ICD-10-CM

## 2023-09-15 DIAGNOSIS — K21.9 GASTROESOPHAGEAL REFLUX DISEASE WITHOUT ESOPHAGITIS: ICD-10-CM

## 2023-09-15 DIAGNOSIS — E78.2 MIXED HYPERLIPIDEMIA: ICD-10-CM

## 2023-09-15 RX ORDER — AMLODIPINE BESYLATE 10 MG/1
10 TABLET ORAL DAILY
Qty: 30 TABLET | Refills: 0 | Status: SHIPPED | OUTPATIENT
Start: 2023-09-15 | End: 2023-09-21 | Stop reason: SDUPTHER

## 2023-09-15 RX ORDER — PANTOPRAZOLE SODIUM 40 MG/1
40 TABLET, DELAYED RELEASE ORAL DAILY
Qty: 30 TABLET | Refills: 0 | Status: SHIPPED | OUTPATIENT
Start: 2023-09-15 | End: 2023-11-01

## 2023-09-21 DIAGNOSIS — E78.2 MIXED HYPERLIPIDEMIA: ICD-10-CM

## 2023-09-21 RX ORDER — AMLODIPINE BESYLATE 10 MG/1
10 TABLET ORAL DAILY
Qty: 30 TABLET | Refills: 0 | Status: SHIPPED | OUTPATIENT
Start: 2023-09-21

## 2023-09-27 ENCOUNTER — TELEPHONE (OUTPATIENT)
Dept: FAMILY MEDICINE CLINIC | Age: 83
End: 2023-09-27

## 2023-09-27 ENCOUNTER — OFFICE VISIT (OUTPATIENT)
Dept: FAMILY MEDICINE CLINIC | Age: 83
End: 2023-09-27

## 2023-09-27 VITALS
HEIGHT: 63 IN | DIASTOLIC BLOOD PRESSURE: 66 MMHG | OXYGEN SATURATION: 93 % | WEIGHT: 198 LBS | SYSTOLIC BLOOD PRESSURE: 132 MMHG | BODY MASS INDEX: 35.08 KG/M2 | HEART RATE: 68 BPM

## 2023-09-27 DIAGNOSIS — E11.42 DIABETIC POLYNEUROPATHY ASSOCIATED WITH TYPE 2 DIABETES MELLITUS (HCC): ICD-10-CM

## 2023-09-27 DIAGNOSIS — Z79.4 TYPE 2 DIABETES MELLITUS WITH DIABETIC NEPHROPATHY, WITH LONG-TERM CURRENT USE OF INSULIN (HCC): ICD-10-CM

## 2023-09-27 DIAGNOSIS — E78.2 MIXED HYPERLIPIDEMIA: ICD-10-CM

## 2023-09-27 DIAGNOSIS — E11.65 UNCONTROLLED TYPE 2 DIABETES MELLITUS WITH HYPERGLYCEMIA (HCC): Primary | ICD-10-CM

## 2023-09-27 DIAGNOSIS — E11.21 TYPE 2 DIABETES MELLITUS WITH DIABETIC NEPHROPATHY, WITH LONG-TERM CURRENT USE OF INSULIN (HCC): ICD-10-CM

## 2023-09-27 DIAGNOSIS — I67.9 CEREBRAL VASCULAR DISEASE: ICD-10-CM

## 2023-09-27 DIAGNOSIS — N18.4 CKD (CHRONIC KIDNEY DISEASE) STAGE 4, GFR 15-29 ML/MIN (HCC): ICD-10-CM

## 2023-09-27 DIAGNOSIS — Z23 NEED FOR INFLUENZA VACCINATION: ICD-10-CM

## 2023-09-27 DIAGNOSIS — I10 ESSENTIAL HYPERTENSION: ICD-10-CM

## 2023-09-27 DIAGNOSIS — E66.01 SEVERE OBESITY (BMI 35.0-39.9) WITH COMORBIDITY (HCC): ICD-10-CM

## 2023-09-27 LAB — HBA1C MFR BLD: 9.3 %

## 2023-09-27 RX ORDER — INSULIN GLARGINE 100 [IU]/ML
50 INJECTION, SOLUTION SUBCUTANEOUS NIGHTLY
Qty: 5 ADJUSTABLE DOSE PRE-FILLED PEN SYRINGE | Refills: 3 | Status: SHIPPED | OUTPATIENT
Start: 2023-09-27 | End: 2023-12-26

## 2023-09-27 RX ORDER — INSULIN LISPRO 100 [IU]/ML
4-30 INJECTION, SOLUTION INTRAVENOUS; SUBCUTANEOUS
Qty: 27 ML | Refills: 2 | Status: SHIPPED | OUTPATIENT
Start: 2023-09-27 | End: 2023-12-26

## 2023-09-27 SDOH — ECONOMIC STABILITY: INCOME INSECURITY: HOW HARD IS IT FOR YOU TO PAY FOR THE VERY BASICS LIKE FOOD, HOUSING, MEDICAL CARE, AND HEATING?: SOMEWHAT HARD

## 2023-09-27 SDOH — ECONOMIC STABILITY: HOUSING INSECURITY
IN THE LAST 12 MONTHS, WAS THERE A TIME WHEN YOU DID NOT HAVE A STEADY PLACE TO SLEEP OR SLEPT IN A SHELTER (INCLUDING NOW)?: NO

## 2023-09-27 SDOH — ECONOMIC STABILITY: FOOD INSECURITY: WITHIN THE PAST 12 MONTHS, THE FOOD YOU BOUGHT JUST DIDN'T LAST AND YOU DIDN'T HAVE MONEY TO GET MORE.: NEVER TRUE

## 2023-09-27 SDOH — ECONOMIC STABILITY: FOOD INSECURITY: WITHIN THE PAST 12 MONTHS, YOU WORRIED THAT YOUR FOOD WOULD RUN OUT BEFORE YOU GOT MONEY TO BUY MORE.: NEVER TRUE

## 2023-09-27 SDOH — ECONOMIC STABILITY: TRANSPORTATION INSECURITY
IN THE PAST 12 MONTHS, HAS LACK OF TRANSPORTATION KEPT YOU FROM MEETINGS, WORK, OR FROM GETTING THINGS NEEDED FOR DAILY LIVING?: NO

## 2023-09-27 NOTE — TELEPHONE ENCOUNTER
Pt daughter called in sees the prescription for insulins she wants those sent to [facility] since she is on a program       Please advise once  complete

## 2023-09-27 NOTE — PATIENT INSTRUCTIONS
INSTRUCTIONS  NEXT APPOINTMENT: Please schedule check-up in 1 month with Dr. Michelle Bennett or her NP, Roel Santiago. PLEASE TAKE THIS FORM TO CHECK-OUT WINDOW TO SCHEDULE NEXT VISIT. PLEASE GET FASTING BLOODWORK DRAWN SOON. Lab is on first floor in [address]. Hours Monday to Friday 6:30 AM to 4 PM AND Saturday 8-12. Please get annual flu and covid vaccines when available in fall. Can get at stores such as [employer] and [employer] . CHANGE insulin when have BOTH new insulins at home. Start in morning. UNTIL then, use the 75/25, take 50-70 units with breakfast and 0-20 units with dinner.

## 2023-09-28 RX ORDER — INSULIN GLARGINE 100 [IU]/ML
50 INJECTION, SOLUTION SUBCUTANEOUS NIGHTLY
Qty: 15 ADJUSTABLE DOSE PRE-FILLED PEN SYRINGE | Refills: 3 | Status: CANCELLED | OUTPATIENT
Start: 2023-09-28 | End: 2023-12-27

## 2023-09-28 NOTE — TELEPHONE ENCOUNTER
Form filled out and on your desk to sign. Please sign script to print out, it needs to go with the papers when we get them sent in.

## 2023-10-03 DIAGNOSIS — N18.4 CKD (CHRONIC KIDNEY DISEASE) STAGE 4, GFR 15-29 ML/MIN (HCC): ICD-10-CM

## 2023-10-03 DIAGNOSIS — I10 ESSENTIAL HYPERTENSION: ICD-10-CM

## 2023-10-03 DIAGNOSIS — E11.42 DIABETIC POLYNEUROPATHY ASSOCIATED WITH TYPE 2 DIABETES MELLITUS (HCC): ICD-10-CM

## 2023-10-03 DIAGNOSIS — E78.2 MIXED HYPERLIPIDEMIA: ICD-10-CM

## 2023-10-03 LAB
ALBUMIN SERPL-MCNC: 4.6 G/DL (ref 3.4–5)
ALBUMIN/GLOB SERPL: 2 {RATIO} (ref 1.1–2.2)
ALP SERPL-CCNC: 128 U/L (ref 40–129)
ALT SERPL-CCNC: <5 U/L (ref 10–40)
ANION GAP SERPL CALCULATED.3IONS-SCNC: 12 MMOL/L (ref 3–16)
AST SERPL-CCNC: 12 U/L (ref 15–37)
BASOPHILS # BLD: 0 K/UL (ref 0–0.2)
BASOPHILS NFR BLD: 0.6 %
BILIRUB SERPL-MCNC: 0.6 MG/DL (ref 0–1)
BUN SERPL-MCNC: 27 MG/DL (ref 7–20)
CALCIUM SERPL-MCNC: 9.1 MG/DL (ref 8.3–10.6)
CHLORIDE SERPL-SCNC: 105 MMOL/L (ref 99–110)
CHOLEST SERPL-MCNC: 140 MG/DL (ref 0–199)
CO2 SERPL-SCNC: 26 MMOL/L (ref 21–32)
CREAT SERPL-MCNC: 1.6 MG/DL (ref 0.6–1.2)
DEPRECATED RDW RBC AUTO: 16.6 % (ref 12.4–15.4)
EOSINOPHIL # BLD: 0.3 K/UL (ref 0–0.6)
EOSINOPHIL NFR BLD: 4.1 %
GFR SERPLBLD CREATININE-BSD FMLA CKD-EPI: 32 ML/MIN/{1.73_M2}
GLUCOSE SERPL-MCNC: 199 MG/DL (ref 70–99)
HCT VFR BLD AUTO: 38.2 % (ref 36–48)
HDLC SERPL-MCNC: 57 MG/DL (ref 40–60)
HGB BLD-MCNC: 12.9 G/DL (ref 12–16)
LDLC SERPL CALC-MCNC: 65 MG/DL
LYMPHOCYTES # BLD: 1.7 K/UL (ref 1–5.1)
LYMPHOCYTES NFR BLD: 27.8 %
MCH RBC QN AUTO: 28.6 PG (ref 26–34)
MCHC RBC AUTO-ENTMCNC: 33.6 G/DL (ref 31–36)
MCV RBC AUTO: 85 FL (ref 80–100)
MONOCYTES # BLD: 0.7 K/UL (ref 0–1.3)
MONOCYTES NFR BLD: 10.7 %
NEUTROPHILS # BLD: 3.5 K/UL (ref 1.7–7.7)
NEUTROPHILS NFR BLD: 56.8 %
PLATELET # BLD AUTO: 239 K/UL (ref 135–450)
PMV BLD AUTO: 10.3 FL (ref 5–10.5)
POTASSIUM SERPL-SCNC: 4.6 MMOL/L (ref 3.5–5.1)
PROT SERPL-MCNC: 6.9 G/DL (ref 6.4–8.2)
RBC # BLD AUTO: 4.49 M/UL (ref 4–5.2)
SODIUM SERPL-SCNC: 143 MMOL/L (ref 136–145)
TRIGL SERPL-MCNC: 90 MG/DL (ref 0–150)
VLDLC SERPL CALC-MCNC: 18 MG/DL
WBC # BLD AUTO: 6.1 K/UL (ref 4–11)

## 2023-10-09 DIAGNOSIS — F32.5 MAJOR DEPRESSIVE DISORDER WITH SINGLE EPISODE, IN FULL REMISSION (HCC): ICD-10-CM

## 2023-10-09 DIAGNOSIS — E78.2 MIXED HYPERLIPIDEMIA: ICD-10-CM

## 2023-10-09 RX ORDER — FLUOXETINE HYDROCHLORIDE 40 MG/1
CAPSULE ORAL
Qty: 90 CAPSULE | Refills: 3 | Status: SHIPPED | OUTPATIENT
Start: 2023-10-09

## 2023-10-09 RX ORDER — AMLODIPINE BESYLATE 10 MG/1
10 TABLET ORAL DAILY
Qty: 30 TABLET | Refills: 11 | Status: SHIPPED | OUTPATIENT
Start: 2023-10-09

## 2023-10-26 ENCOUNTER — OFFICE VISIT (OUTPATIENT)
Dept: FAMILY MEDICINE CLINIC | Age: 83
End: 2023-10-26

## 2023-10-26 VITALS
HEIGHT: 63 IN | SYSTOLIC BLOOD PRESSURE: 136 MMHG | OXYGEN SATURATION: 96 % | WEIGHT: 198 LBS | DIASTOLIC BLOOD PRESSURE: 64 MMHG | HEART RATE: 75 BPM | BODY MASS INDEX: 35.08 KG/M2

## 2023-10-26 DIAGNOSIS — H01.132 ECZEMATOUS DERMATITIS OF LOWER EYELIDS OF BOTH EYES: ICD-10-CM

## 2023-10-26 DIAGNOSIS — E11.65 UNCONTROLLED TYPE 2 DIABETES MELLITUS WITH HYPERGLYCEMIA (HCC): Primary | ICD-10-CM

## 2023-10-26 DIAGNOSIS — H01.135 ECZEMATOUS DERMATITIS OF LOWER EYELIDS OF BOTH EYES: ICD-10-CM

## 2023-10-26 NOTE — PROGRESS NOTES
CHRONIC CONDITION FOLLOW-UP     Assessment and Plan:      Diagnosis Orders   1. Uncontrolled type 2 diabetes mellitus with hyperglycemia (HCC)        2. 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 Any changes marked below. INSTRUCTIONS  NEXT APPOINTMENT: Please schedule check-up in 2 months with Dr. Cholo Lugo or her NP, Katarina Law. PLEASE TAKE THIS FORM TO CHECK-OUT WINDOW TO SCHEDULE NEXT VISIT. ADD 4 units to lunch sliding scale. Let me know in a couple weeks if still getting frequent highs > 250, or lows < 60. For itching/rash, may use OTC 1% hydrocortisone cream as needed. Eucerin or Lubriderm cream to moisturize. Subjective:      Chief Complaint   Patient presents with    Diabetes     Dm d/u and check BP     Evelia Guerrier is an 80 y.o. female who presents for follow up    Complaints:   1 mo DM FU. Insulin changed to Basaglar and Humalog. Still some lows in early AM with highs before lunch or in afternoon  Lunch biggest meal of day. Itchy skin below eyes. CHART REVIEW   reports that she quit smoking about 21 years ago. Her smoking use included cigarettes. She has a 7.50 pack-year smoking history. She has been exposed to tobacco smoke. She has never used smokeless tobacco. She reports that she does not drink alcohol and does not use drugs.   Health Maintenance Due   Topic Date Due    Hepatitis B vaccine (1 of 3 - Risk 3-dose series) Never done    Shingles vaccine (2 of 3) 02/06/2017    DTaP/Tdap/Td vaccine (3 - Td or Tdap) 07/15/2020    COVID-19 Vaccine (3 - Pfizer series) 08/30/2021    Annual Wellness Visit (AWV)  09/23/2023     Current Outpatient Medications   Medication Instructions    amLODIPine (NORVASC) 10 mg, Oral, DAILY    aspirin 325 mg, Oral, DAILY    atorvastatin (LIPITOR) 40 MG tablet TAKE 1 TABLET BY MOUTH ONCE DAILY    Basaglar KwikPen 50 Units, SubCUTAneous, NIGHTLY    blood glucose monitor kit and supplies

## 2023-10-26 NOTE — PATIENT INSTRUCTIONS
Patient Education     TOP 10 TIPS FOR RELIEVING DRY SKIN    Bathe daily. A daily bath or shower can add much-needed moisture to the skin. To hydrate the skin with a daily bath or shower, follow these guidelines:   Keep it short. A 5- to 10-minute bath or shower adds moisture. Spend more time in the water and the skin begins to dry. Use warm, not hot, water. Hot water removes natural oils from the skin more quickly than warm water. The more natural oils removed, the drier the skin becomes. Close the bathroom door. This keeps the much-needed humidity in the room. Use a mild cleanser. Deodorant bars, fragrance in soaps, and products containing alcohol strip natural oils from the skin, which dries the skin. Look for a mild, fragrance-free cleanser that moisturizes. Gently pat the skin dry. Gently blotting the skin helps retain moisture and is less irritating to dry, sensitive skin. Apply moisturizer within 3 minutes of getting out of the bath or shower. Contrary to popular belief, moisturizer does not add moisture to the skin. Moisturizer traps existing water in the skin, preventing the water from evaporating. To trap water from a bath or shower in the skin, moisturizer must be applied within 3 minutes of bathing. Applied regularly, this helps decrease dryness and itching. Moisturize, moisturize, moisture. Dry skin needs moisture. Applying moisturizer within 3 minutes of bathing seals in much-needed water. If skin is noticeably dry and uncomfortable, moisturizing more frequently throughout the day can help the skin heal. Consistent use of moisturizer will help prevent dry skin from returning. Select moisturizers best suited to relieve dry skin. Ointments and creams tend to be more effective than lotions. Creams and ointments also usually less irritating to dry, sensitive skin. Moisturizer does not need to be expensive to be effective. Look at the ingredients not the price.  When selecting a

## 2023-10-31 DIAGNOSIS — K21.9 GASTROESOPHAGEAL REFLUX DISEASE WITHOUT ESOPHAGITIS: ICD-10-CM

## 2023-10-31 NOTE — TELEPHONE ENCOUNTER
Looks like her mail service wants #100 sent with refills for 1 year, if this is what you want her to have.   Pended for #100 with 3 refills

## 2023-11-01 RX ORDER — PANTOPRAZOLE SODIUM 40 MG/1
40 TABLET, DELAYED RELEASE ORAL DAILY
Qty: 100 TABLET | Refills: 3 | Status: SHIPPED | OUTPATIENT
Start: 2023-11-01

## 2023-11-21 DIAGNOSIS — E11.21 TYPE 2 DIABETES MELLITUS WITH DIABETIC NEPHROPATHY, WITH LONG-TERM CURRENT USE OF INSULIN (HCC): ICD-10-CM

## 2023-11-21 DIAGNOSIS — Z79.4 TYPE 2 DIABETES MELLITUS WITH DIABETIC NEPHROPATHY, WITH LONG-TERM CURRENT USE OF INSULIN (HCC): ICD-10-CM

## 2023-12-28 ENCOUNTER — OFFICE VISIT (OUTPATIENT)
Dept: FAMILY MEDICINE CLINIC | Age: 83
End: 2023-12-28
Payer: MEDICARE

## 2023-12-28 VITALS
DIASTOLIC BLOOD PRESSURE: 82 MMHG | RESPIRATION RATE: 18 BRPM | HEIGHT: 63 IN | HEART RATE: 87 BPM | TEMPERATURE: 97.5 F | WEIGHT: 204.4 LBS | SYSTOLIC BLOOD PRESSURE: 150 MMHG | BODY MASS INDEX: 36.21 KG/M2 | OXYGEN SATURATION: 96 %

## 2023-12-28 DIAGNOSIS — N18.32 STAGE 3B CHRONIC KIDNEY DISEASE (HCC): ICD-10-CM

## 2023-12-28 DIAGNOSIS — Z79.4 TYPE 2 DIABETES MELLITUS WITH DIABETIC NEPHROPATHY, WITH LONG-TERM CURRENT USE OF INSULIN (HCC): ICD-10-CM

## 2023-12-28 DIAGNOSIS — E11.42 DIABETIC POLYNEUROPATHY ASSOCIATED WITH TYPE 2 DIABETES MELLITUS (HCC): ICD-10-CM

## 2023-12-28 DIAGNOSIS — E11.65 UNCONTROLLED TYPE 2 DIABETES MELLITUS WITH HYPERGLYCEMIA (HCC): Primary | ICD-10-CM

## 2023-12-28 DIAGNOSIS — E11.21 TYPE 2 DIABETES MELLITUS WITH DIABETIC NEPHROPATHY, WITH LONG-TERM CURRENT USE OF INSULIN (HCC): ICD-10-CM

## 2023-12-28 LAB — HBA1C MFR BLD: 8.1 %

## 2023-12-28 PROCEDURE — 83036 HEMOGLOBIN GLYCOSYLATED A1C: CPT | Performed by: FAMILY MEDICINE

## 2023-12-28 PROCEDURE — 3052F HG A1C>EQUAL 8.0%<EQUAL 9.0%: CPT | Performed by: FAMILY MEDICINE

## 2023-12-28 PROCEDURE — G8417 CALC BMI ABV UP PARAM F/U: HCPCS | Performed by: FAMILY MEDICINE

## 2023-12-28 PROCEDURE — G8399 PT W/DXA RESULTS DOCUMENT: HCPCS | Performed by: FAMILY MEDICINE

## 2023-12-28 PROCEDURE — 1036F TOBACCO NON-USER: CPT | Performed by: FAMILY MEDICINE

## 2023-12-28 PROCEDURE — G8427 DOCREV CUR MEDS BY ELIG CLIN: HCPCS | Performed by: FAMILY MEDICINE

## 2023-12-28 PROCEDURE — G8484 FLU IMMUNIZE NO ADMIN: HCPCS | Performed by: FAMILY MEDICINE

## 2023-12-28 PROCEDURE — 99214 OFFICE O/P EST MOD 30 MIN: CPT | Performed by: FAMILY MEDICINE

## 2023-12-28 PROCEDURE — 1123F ACP DISCUSS/DSCN MKR DOCD: CPT | Performed by: FAMILY MEDICINE

## 2023-12-28 PROCEDURE — 1090F PRES/ABSN URINE INCON ASSESS: CPT | Performed by: FAMILY MEDICINE

## 2023-12-28 RX ORDER — INSULIN LISPRO 100 [IU]/ML
6-30 INJECTION, SOLUTION INTRAVENOUS; SUBCUTANEOUS
Qty: 27 ML | Refills: 2 | Status: SHIPPED | OUTPATIENT
Start: 2023-12-28 | End: 2024-03-27

## 2023-12-28 NOTE — PROGRESS NOTES
CHRONIC CONDITION FOLLOW-UP   Assessment and Plan:      Diagnosis Orders   1. Uncontrolled type 2 diabetes mellitus with hyperglycemia ([address] )        2. Diabetic polyneuropathy associated with type 2 diabetes mellitus (HCC)  POCT glycosylated hemoglobin (Hb A1C)      3. Stage 3b chronic kidney disease ([address])        4. Type 2 diabetes mellitus with diabetic nephropathy, with long-term current use of insulin ([facility])  insulin lispro, 1 Unit Dial, (HUMALOG KWIKPEN) 100 UNIT/ML SOPN      better  A1c better at 8.1 but not yet to goal     Continue current Tx plan. Any changes marked below. INSTRUCTIONS  NEXT APPOINTMENT: Please schedule annual complete physical (30 minutes) in 3 months  with Dr. Desean Madrigal or her NP, Valente Suero. PLEASE TAKE THIS FORM TO CHECK-OUT WINDOW TO SCHEDULE NEXT VISIT. INCREASE low end sliding scale (before breakfast) to 6 units)  Sig: Inject 6-30 Units into the skin 3 times daily (before meals) Glucose < 70  none;  6 U; 101-150 10 U; 151-200 15 U; 201-250 20 U; 251-300 25 U; 301-350 18 U; > 350 30 U    Subjective:      Chief Complaint   Patient presents with    Diabetes     Sugar has been up and down. Erica Goodwin is an 80 y.o. female who presents for follow up    Complaints:   No low sugars, 50% are in goal range in past week.     CHART REVIEW  Health Maintenance Due   Topic Date Due    Respiratory Syncytial Virus (RSV) Pregnant or age 61 yrs+ (3 - 3-dose 60+ series) Never done    Shingles vaccine (2 of 3) 2017    DTaP/Tdap/Td vaccine (3 - Td or Tdap) 07/15/2020    COVID-19 Vaccine (3 - - season) 2023    Annual Wellness Visit (AWV)  2023     Social History     Tobacco Use    Smoking status: Former     Current packs/day: 0.00     Average packs/day: 0.5 packs/day for 15.0 years (7.5 ttl pk-yrs)     Types: Cigarettes     Start date: 1987     Quit date: 2002     Years since quittin.0     Passive exposure: Past    Smokeless tobacco: Never    Tobacco

## 2023-12-28 NOTE — PATIENT INSTRUCTIONS
INSTRUCTIONS  NEXT APPOINTMENT: Please schedule annual complete physical (30 minutes) in 3 months  with Dr. Marycarmen Schrader or her NP, Latha Mendez. PLEASE TAKE THIS FORM TO CHECK-OUT WINDOW TO SCHEDULE NEXT VISIT.    INCREASE low end sliding scale (before breakfast) to 6 units)  Sig: Inject 6-30 Units into the skin 3 times daily (before meals) Glucose < 70  none;  6 U; 101-150 10 U; 151-200 15 U; 201-250 20 U; 251-300 25 U; 301-350 18 U; > 350 30 U

## 2024-01-19 DIAGNOSIS — Z79.4 TYPE 2 DIABETES MELLITUS WITH DIABETIC NEPHROPATHY, WITH LONG-TERM CURRENT USE OF INSULIN (HCC): ICD-10-CM

## 2024-01-19 DIAGNOSIS — E11.21 TYPE 2 DIABETES MELLITUS WITH DIABETIC NEPHROPATHY, WITH LONG-TERM CURRENT USE OF INSULIN (HCC): ICD-10-CM

## 2024-01-19 RX ORDER — INSULIN LISPRO 100 [IU]/ML
6-30 INJECTION, SOLUTION INTRAVENOUS; SUBCUTANEOUS
Qty: 27 ML | Refills: 2 | Status: SHIPPED | OUTPATIENT
Start: 2024-01-19 | End: 2024-04-18

## 2024-01-29 DIAGNOSIS — Z79.4 TYPE 2 DIABETES MELLITUS WITH DIABETIC NEPHROPATHY, WITH LONG-TERM CURRENT USE OF INSULIN (HCC): ICD-10-CM

## 2024-01-29 DIAGNOSIS — E11.21 TYPE 2 DIABETES MELLITUS WITH DIABETIC NEPHROPATHY, WITH LONG-TERM CURRENT USE OF INSULIN (HCC): ICD-10-CM

## 2024-01-31 RX ORDER — INSULIN GLARGINE 100 [IU]/ML
50 INJECTION, SOLUTION SUBCUTANEOUS NIGHTLY
Qty: 5 ADJUSTABLE DOSE PRE-FILLED PEN SYRINGE | Refills: 3 | OUTPATIENT
Start: 2024-01-31 | End: 2024-04-30

## 2024-02-02 DIAGNOSIS — I67.9 CEREBRAL VASCULAR DISEASE: ICD-10-CM

## 2024-02-02 DIAGNOSIS — I65.21 STENOSIS OF RIGHT CAROTID ARTERY: ICD-10-CM

## 2024-02-05 ENCOUNTER — TELEPHONE (OUTPATIENT)
Dept: FAMILY MEDICINE CLINIC | Age: 84
End: 2024-02-05

## 2024-02-05 RX ORDER — CLOPIDOGREL BISULFATE 75 MG/1
TABLET ORAL
Qty: 100 TABLET | Refills: 2 | Status: SHIPPED | OUTPATIENT
Start: 2024-02-05

## 2024-02-05 NOTE — TELEPHONE ENCOUNTER
Patient needs a new rx.   Her Basaglar Flori is non formulary on her plan.  She can use:  Toujeo  Treisba  Levemir flexpen  Patient would like this sent to [facility]

## 2024-02-05 NOTE — TELEPHONE ENCOUNTER
Pt called in wanting to know if her medication was sent to [facility] this is for the basagular she is almost out  please advise

## 2024-02-06 DIAGNOSIS — Z79.4 TYPE 2 DIABETES MELLITUS WITH DIABETIC NEPHROPATHY, WITH LONG-TERM CURRENT USE OF INSULIN (HCC): ICD-10-CM

## 2024-02-06 DIAGNOSIS — E11.21 TYPE 2 DIABETES MELLITUS WITH DIABETIC NEPHROPATHY, WITH LONG-TERM CURRENT USE OF INSULIN (HCC): ICD-10-CM

## 2024-02-06 RX ORDER — INSULIN GLARGINE 100 [IU]/ML
50 INJECTION, SOLUTION SUBCUTANEOUS NIGHTLY
Qty: 5 ADJUSTABLE DOSE PRE-FILLED PEN SYRINGE | Refills: 4 | Status: SHIPPED | OUTPATIENT
Start: 2024-02-06 | End: 2024-05-06

## 2024-03-10 DIAGNOSIS — E78.2 MIXED HYPERLIPIDEMIA: ICD-10-CM

## 2024-03-11 RX ORDER — ATORVASTATIN CALCIUM 40 MG/1
TABLET, FILM COATED ORAL
Qty: 100 TABLET | Refills: 2 | Status: SHIPPED | OUTPATIENT
Start: 2024-03-11

## 2024-03-11 NOTE — TELEPHONE ENCOUNTER
Refilled, but she is due this month for follow up and does not yet have appointment scheduled.  Please assist her in scheduling. thanks

## 2024-04-11 RX ORDER — BLOOD SUGAR DIAGNOSTIC
STRIP MISCELLANEOUS
Qty: 400 STRIP | Refills: 2 | Status: SHIPPED | OUTPATIENT
Start: 2024-04-11

## 2024-05-22 DIAGNOSIS — E11.21 TYPE 2 DIABETES MELLITUS WITH DIABETIC NEPHROPATHY, WITH LONG-TERM CURRENT USE OF INSULIN (HCC): ICD-10-CM

## 2024-05-22 DIAGNOSIS — Z79.4 TYPE 2 DIABETES MELLITUS WITH DIABETIC NEPHROPATHY, WITH LONG-TERM CURRENT USE OF INSULIN (HCC): ICD-10-CM

## 2024-06-23 DIAGNOSIS — E78.2 MIXED HYPERLIPIDEMIA: ICD-10-CM

## 2024-06-24 RX ORDER — AMLODIPINE BESYLATE 10 MG/1
10 TABLET ORAL DAILY
Qty: 100 TABLET | Refills: 1 | Status: SHIPPED | OUTPATIENT
Start: 2024-06-24

## 2024-07-03 ENCOUNTER — OFFICE VISIT (OUTPATIENT)
Dept: FAMILY MEDICINE CLINIC | Age: 84
End: 2024-07-03

## 2024-07-03 VITALS
WEIGHT: 197.8 LBS | HEART RATE: 78 BPM | SYSTOLIC BLOOD PRESSURE: 136 MMHG | OXYGEN SATURATION: 93 % | BODY MASS INDEX: 35.05 KG/M2 | HEIGHT: 63 IN | DIASTOLIC BLOOD PRESSURE: 78 MMHG

## 2024-07-03 DIAGNOSIS — R41.3 MEMORY PROBLEM: ICD-10-CM

## 2024-07-03 DIAGNOSIS — N18.32 STAGE 3B CHRONIC KIDNEY DISEASE (HCC): ICD-10-CM

## 2024-07-03 DIAGNOSIS — E11.21 TYPE 2 DIABETES MELLITUS WITH DIABETIC NEPHROPATHY, WITH LONG-TERM CURRENT USE OF INSULIN (HCC): ICD-10-CM

## 2024-07-03 DIAGNOSIS — E53.8 B12 DEFICIENCY: ICD-10-CM

## 2024-07-03 DIAGNOSIS — E11.65 UNCONTROLLED TYPE 2 DIABETES MELLITUS WITH HYPERGLYCEMIA (HCC): Primary | ICD-10-CM

## 2024-07-03 DIAGNOSIS — Z79.4 TYPE 2 DIABETES MELLITUS WITH DIABETIC NEPHROPATHY, WITH LONG-TERM CURRENT USE OF INSULIN (HCC): ICD-10-CM

## 2024-07-03 DIAGNOSIS — F32.5 MAJOR DEPRESSIVE DISORDER WITH SINGLE EPISODE, IN FULL REMISSION (HCC): ICD-10-CM

## 2024-07-03 DIAGNOSIS — E78.2 MIXED HYPERLIPIDEMIA: ICD-10-CM

## 2024-07-03 DIAGNOSIS — E66.01 SEVERE OBESITY (BMI 35.0-39.9) WITH COMORBIDITY (HCC): ICD-10-CM

## 2024-07-03 PROBLEM — N18.4 CKD (CHRONIC KIDNEY DISEASE) STAGE 4, GFR 15-29 ML/MIN (HCC): Status: ACTIVE | Noted: 2024-07-03

## 2024-07-03 LAB
ANION GAP SERPL CALCULATED.3IONS-SCNC: 11 MMOL/L (ref 3–16)
BASOPHILS # BLD: 0 K/UL (ref 0–0.2)
BASOPHILS NFR BLD: 0.4 %
BUN SERPL-MCNC: 29 MG/DL (ref 7–20)
CALCIUM SERPL-MCNC: 9.7 MG/DL (ref 8.3–10.6)
CHLORIDE SERPL-SCNC: 104 MMOL/L (ref 99–110)
CO2 SERPL-SCNC: 29 MMOL/L (ref 21–32)
CREAT SERPL-MCNC: 1.5 MG/DL (ref 0.6–1.2)
DEPRECATED RDW RBC AUTO: 16.6 % (ref 12.4–15.4)
EOSINOPHIL # BLD: 0.2 K/UL (ref 0–0.6)
EOSINOPHIL NFR BLD: 3.3 %
GFR SERPLBLD CREATININE-BSD FMLA CKD-EPI: 34 ML/MIN/{1.73_M2}
GLUCOSE SERPL-MCNC: 166 MG/DL (ref 70–99)
HBA1C MFR BLD: 8.2 %
HCT VFR BLD AUTO: 40.2 % (ref 36–48)
HGB BLD-MCNC: 13.4 G/DL (ref 12–16)
LYMPHOCYTES # BLD: 2.1 K/UL (ref 1–5.1)
LYMPHOCYTES NFR BLD: 29.3 %
MCH RBC QN AUTO: 28.9 PG (ref 26–34)
MCHC RBC AUTO-ENTMCNC: 33.3 G/DL (ref 31–36)
MCV RBC AUTO: 86.6 FL (ref 80–100)
MONOCYTES # BLD: 0.7 K/UL (ref 0–1.3)
MONOCYTES NFR BLD: 9.3 %
NEUTROPHILS # BLD: 4.1 K/UL (ref 1.7–7.7)
NEUTROPHILS NFR BLD: 57.7 %
PLATELET # BLD AUTO: 238 K/UL (ref 135–450)
PLATELET BLD QL SMEAR: ADEQUATE
PMV BLD AUTO: 10.7 FL (ref 5–10.5)
POTASSIUM SERPL-SCNC: 4.7 MMOL/L (ref 3.5–5.1)
RBC # BLD AUTO: 4.64 M/UL (ref 4–5.2)
SLIDE REVIEW: ABNORMAL
SODIUM SERPL-SCNC: 144 MMOL/L (ref 136–145)
VIT B12 SERPL-MCNC: 457 PG/ML (ref 211–911)
WBC # BLD AUTO: 7.1 K/UL (ref 4–11)

## 2024-07-03 ASSESSMENT — PATIENT HEALTH QUESTIONNAIRE - PHQ9
SUM OF ALL RESPONSES TO PHQ QUESTIONS 1-9: 3
2. FEELING DOWN, DEPRESSED OR HOPELESS: NOT AT ALL
4. FEELING TIRED OR HAVING LITTLE ENERGY: NOT AT ALL
6. FEELING BAD ABOUT YOURSELF - OR THAT YOU ARE A FAILURE OR HAVE LET YOURSELF OR YOUR FAMILY DOWN: NOT AT ALL
9. THOUGHTS THAT YOU WOULD BE BETTER OFF DEAD, OR OF HURTING YOURSELF: NOT AT ALL
1. LITTLE INTEREST OR PLEASURE IN DOING THINGS: NOT AT ALL
7. TROUBLE CONCENTRATING ON THINGS, SUCH AS READING THE NEWSPAPER OR WATCHING TELEVISION: SEVERAL DAYS
SUM OF ALL RESPONSES TO PHQ QUESTIONS 1-9: 3
SUM OF ALL RESPONSES TO PHQ QUESTIONS 1-9: 3
SUM OF ALL RESPONSES TO PHQ9 QUESTIONS 1 & 2: 0
3. TROUBLE FALLING OR STAYING ASLEEP: NOT AT ALL
8. MOVING OR SPEAKING SO SLOWLY THAT OTHER PEOPLE COULD HAVE NOTICED. OR THE OPPOSITE, BEING SO FIGETY OR RESTLESS THAT YOU HAVE BEEN MOVING AROUND A LOT MORE THAN USUAL: NOT AT ALL
10. IF YOU CHECKED OFF ANY PROBLEMS, HOW DIFFICULT HAVE THESE PROBLEMS MADE IT FOR YOU TO DO YOUR WORK, TAKE CARE OF THINGS AT HOME, OR GET ALONG WITH OTHER PEOPLE: NOT DIFFICULT AT ALL
5. POOR APPETITE OR OVEREATING: MORE THAN HALF THE DAYS
SUM OF ALL RESPONSES TO PHQ QUESTIONS 1-9: 3

## 2024-07-03 NOTE — PATIENT INSTRUCTIONS
INSTRUCTIONS  NEXT APPOINTMENT: Please schedule fasting annual physical (30 minutes) in 3 months.  OK to have water and medications (except for diabetes medicines)    PLEASE TAKE THIS FORM TO CHECK-OUT WINDOW TO SCHEDULE NEXT VISIT.   PLEASE GET BLOODWORK DRAWN TODAY ON FIRST FLOOR in 170.  Take orders with you.   Please get annual flu and covid vaccines when available in fall.  Can get at stores such as [employer] and [employer].  See Nephrologist soon.  A1c is 8.2. A little high still.   Set reminders to take insulin with lunch.  Contact diabetic pharmacist about switching over to twice a day mixture of insulin instead of current 2 insulins to simplify dosing.  Contact palliative care to see what else we can do to make things easier and healthier for you at home.  Memory test was good.  Worried about you being lonely. [facility] may be able to provide [facility] to [facility] or other activities.  [facility]  [phone]   [email or web address]

## 2024-07-03 NOTE — PROGRESS NOTES
(L) 10/03/2023    ALKPHOS 128 10/03/2023    BILITOT 0.6 10/03/2023     Lab Results   Component Value Date     10/03/2023    K 4.6 10/03/2023    CREATININE 1.6 (H) 10/03/2023     Lab Results   Component Value Date    LABGLOM 32 (A) 10/03/2023    LABGLOM 28 (A) 02/03/2023    LABGLOM 24 (A) 12/06/2022     CrCl cannot be calculated (Patient's most recent lab result is older than the maximum 180 days allowed.).  Lab Results   Component Value Date    WBC 6.1 10/03/2023    HGB 12.9 10/03/2023     10/03/2023     TSH (uIU/mL)   Date Value   09/03/2020 2.02     Glucose   Date Value Ref Range Status   10/03/2023 199 (H) 70 - 99 mg/dL Final   07/19/2011 230 (H) 70 - 110 mg/dL Final     POC Glucose   Date Value Ref Range Status   09/06/2020 355 (H) 70 - 99 mg/dl Final     Lab Results   Component Value Date    LABA1C 8.2 07/03/2024    LABA1C 8.1 12/28/2023    LABA1C 9.3 09/27/2023     Objective:   PHYSICAL EXAM   /78   Pulse 78   Ht 1.6 m (5' 3\")   Wt 89.7 kg (197 lb 12.8 oz)   LMP  (LMP Unknown)   SpO2 93%   BMI 35.04 kg/m²   BP Readings from Last 5 Encounters:   07/03/24 136/78   12/28/23 (!) 150/82   10/26/23 136/64   09/27/23 132/66   04/17/23 (!) 140/70     Wt Readings from Last 5 Encounters:   07/03/24 89.7 kg (197 lb 12.8 oz)   12/28/23 92.7 kg (204 lb 6.4 oz)   10/26/23 89.8 kg (198 lb)   09/27/23 89.8 kg (198 lb)   04/17/23 88.9 kg (196 lb)   Weight assessment: weight loss   GENERAL:   Weight: obese  well-developed, alert, no distress.        LUNGS:    Breathing unlabored  clear to auscultation bilaterally and good air movement  CARDIOVASC:   regular rate and rhythm  SKIN: warm and dry  Mini mental exam performed. Score 28/30. See scanned form.

## 2024-08-28 ENCOUNTER — TELEPHONE (OUTPATIENT)
Dept: PHARMACY | Age: 84
End: 2024-08-28

## 2024-08-28 NOTE — TELEPHONE ENCOUNTER
New referral for Diabetes Services.    Reached out to schedule the initial appointment.     Left message to please return my call. This is our 3rd attempt. Attempted to reach contact daughter Sylvia. No answer and no voice mail.     Will follow along.     Dorinda Lanza, PharmD  [facility]   [facility]  Diabetes Service  [phone]    For Pharmacy Admin Tracking Only    Program: Medication Management  CPA in place:  Yes  Recommendation Provided To:   Intervention Detail:   Intervention Accepted By:   Gap Closed?:    Time Spent (min): 5

## 2024-09-24 ENCOUNTER — TELEPHONE (OUTPATIENT)
Dept: PHARMACY | Age: 84
End: 2024-09-24

## 2024-09-25 ENCOUNTER — TELEPHONE (OUTPATIENT)
Dept: FAMILY MEDICINE CLINIC | Age: 84
End: 2024-09-25

## 2024-10-16 ENCOUNTER — TELEPHONE (OUTPATIENT)
Dept: FAMILY MEDICINE CLINIC | Age: 84
End: 2024-10-16

## 2024-10-16 DIAGNOSIS — F32.5 MAJOR DEPRESSIVE DISORDER WITH SINGLE EPISODE, IN FULL REMISSION (HCC): ICD-10-CM

## 2024-10-16 DIAGNOSIS — Z79.4 TYPE 2 DIABETES MELLITUS WITH DIABETIC NEPHROPATHY, WITH LONG-TERM CURRENT USE OF INSULIN (HCC): ICD-10-CM

## 2024-10-16 DIAGNOSIS — E11.21 TYPE 2 DIABETES MELLITUS WITH DIABETIC NEPHROPATHY, WITH LONG-TERM CURRENT USE OF INSULIN (HCC): ICD-10-CM

## 2024-10-17 RX ORDER — FLUOXETINE 40 MG/1
CAPSULE ORAL
Qty: 90 CAPSULE | Refills: 0 | Status: SHIPPED | OUTPATIENT
Start: 2024-10-17 | End: 2024-12-18

## 2024-10-21 SDOH — ECONOMIC STABILITY: FOOD INSECURITY: WITHIN THE PAST 12 MONTHS, THE FOOD YOU BOUGHT JUST DIDN'T LAST AND YOU DIDN'T HAVE MONEY TO GET MORE.: NEVER TRUE

## 2024-10-21 SDOH — ECONOMIC STABILITY: FOOD INSECURITY: WITHIN THE PAST 12 MONTHS, YOU WORRIED THAT YOUR FOOD WOULD RUN OUT BEFORE YOU GOT MONEY TO BUY MORE.: NEVER TRUE

## 2024-10-21 SDOH — ECONOMIC STABILITY: INCOME INSECURITY: HOW HARD IS IT FOR YOU TO PAY FOR THE VERY BASICS LIKE FOOD, HOUSING, MEDICAL CARE, AND HEATING?: NOT HARD AT ALL

## 2024-10-24 ENCOUNTER — TELEPHONE (OUTPATIENT)
Dept: FAMILY MEDICINE CLINIC | Age: 84
End: 2024-10-24

## 2024-10-24 ENCOUNTER — OFFICE VISIT (OUTPATIENT)
Dept: FAMILY MEDICINE CLINIC | Age: 84
End: 2024-10-24

## 2024-10-24 VITALS
OXYGEN SATURATION: 96 % | DIASTOLIC BLOOD PRESSURE: 82 MMHG | WEIGHT: 199 LBS | BODY MASS INDEX: 35.26 KG/M2 | HEIGHT: 63 IN | HEART RATE: 70 BPM | SYSTOLIC BLOOD PRESSURE: 136 MMHG | RESPIRATION RATE: 18 BRPM

## 2024-10-24 DIAGNOSIS — I67.9 CEREBRAL VASCULAR DISEASE: ICD-10-CM

## 2024-10-24 DIAGNOSIS — E66.01 SEVERE OBESITY (BMI 35.0-39.9) WITH COMORBIDITY: ICD-10-CM

## 2024-10-24 DIAGNOSIS — L85.3 DRY SKIN: ICD-10-CM

## 2024-10-24 DIAGNOSIS — E11.65 UNCONTROLLED TYPE 2 DIABETES MELLITUS WITH HYPERGLYCEMIA (HCC): Primary | ICD-10-CM

## 2024-10-24 DIAGNOSIS — E11.21 TYPE 2 DIABETES MELLITUS WITH DIABETIC NEPHROPATHY, WITH LONG-TERM CURRENT USE OF INSULIN (HCC): ICD-10-CM

## 2024-10-24 DIAGNOSIS — Z79.4 TYPE 2 DIABETES MELLITUS WITH DIABETIC NEPHROPATHY, WITH LONG-TERM CURRENT USE OF INSULIN (HCC): ICD-10-CM

## 2024-10-24 DIAGNOSIS — E11.42 DIABETIC POLYNEUROPATHY ASSOCIATED WITH TYPE 2 DIABETES MELLITUS (HCC): ICD-10-CM

## 2024-10-24 DIAGNOSIS — L29.9 PRURITIC CONDITION: ICD-10-CM

## 2024-10-24 DIAGNOSIS — F32.5 MAJOR DEPRESSIVE DISORDER WITH SINGLE EPISODE, IN FULL REMISSION (HCC): ICD-10-CM

## 2024-10-24 DIAGNOSIS — N18.32 STAGE 3B CHRONIC KIDNEY DISEASE (HCC): ICD-10-CM

## 2024-10-24 DIAGNOSIS — E78.2 MIXED HYPERLIPIDEMIA: ICD-10-CM

## 2024-10-24 DIAGNOSIS — Z23 NEEDS FLU SHOT: ICD-10-CM

## 2024-10-24 DIAGNOSIS — M25.532 WRIST PAIN, LEFT: ICD-10-CM

## 2024-10-24 PROBLEM — N18.4 CKD (CHRONIC KIDNEY DISEASE) STAGE 4, GFR 15-29 ML/MIN (HCC): Status: RESOLVED | Noted: 2024-07-03 | Resolved: 2024-10-24

## 2024-10-24 LAB — HBA1C MFR BLD: 8.4 %

## 2024-10-24 RX ORDER — INSULIN LISPRO 100 [IU]/ML
6-30 INJECTION, SOLUTION INTRAVENOUS; SUBCUTANEOUS
Qty: 27 ML | Refills: 2 | Status: SHIPPED | OUTPATIENT
Start: 2024-10-24 | End: 2024-10-24 | Stop reason: SDUPTHER

## 2024-10-24 RX ORDER — INSULIN GLARGINE 100 [IU]/ML
45 INJECTION, SOLUTION SUBCUTANEOUS NIGHTLY
Qty: 5 ADJUSTABLE DOSE PRE-FILLED PEN SYRINGE | Refills: 4 | Status: SHIPPED | OUTPATIENT
Start: 2024-10-24 | End: 2025-01-22

## 2024-10-24 RX ORDER — INSULIN LISPRO 100 [IU]/ML
INJECTION, SOLUTION INTRAVENOUS; SUBCUTANEOUS
Qty: 30 ML | Refills: 2 | Status: SHIPPED | OUTPATIENT
Start: 2024-10-24 | End: 2024-10-25 | Stop reason: SDUPTHER

## 2024-10-24 RX ORDER — INSULIN LISPRO 100 [IU]/ML
INJECTION, SOLUTION INTRAVENOUS; SUBCUTANEOUS
Qty: 27 ML | Refills: 2 | Status: SHIPPED | OUTPATIENT
Start: 2024-10-24 | End: 2024-10-24 | Stop reason: SDUPTHER

## 2024-10-24 SDOH — ECONOMIC STABILITY: INCOME INSECURITY: HOW HARD IS IT FOR YOU TO PAY FOR THE VERY BASICS LIKE FOOD, HOUSING, MEDICAL CARE, AND HEATING?: NOT HARD AT ALL

## 2024-10-24 SDOH — ECONOMIC STABILITY: FOOD INSECURITY: WITHIN THE PAST 12 MONTHS, THE FOOD YOU BOUGHT JUST DIDN'T LAST AND YOU DIDN'T HAVE MONEY TO GET MORE.: NEVER TRUE

## 2024-10-24 SDOH — ECONOMIC STABILITY: FOOD INSECURITY: WITHIN THE PAST 12 MONTHS, YOU WORRIED THAT YOUR FOOD WOULD RUN OUT BEFORE YOU GOT MONEY TO BUY MORE.: NEVER TRUE

## 2024-10-24 NOTE — PROGRESS NOTES
CHRONIC CONDITION FOLLOW-UP     Assessment and Plan:      Diagnosis Orders   1. Uncontrolled type 2 diabetes mellitus with hyperglycemia ([facility])  POCT glycosylated hemoglobin (Hb A1C)      2. Mixed hyperlipidemia  Comprehensive Metabolic Panel      3. Stage 3b chronic kidney disease ([facility])  Comprehensive Metabolic Panel      4. Cerebral vascular disease        5. Diabetic polyneuropathy associated with type 2 diabetes mellitus ([facility])        6. Severe obesity (BMI 35.0-39.9) with comorbidity        7. Major depressive disorder with single episode, in full remission ([facility])        8. Needs flu shot  Influenza, FLUAD Trivalent, (age 65 y+), IM, Preservative Free, 0.5mL      9. Type 2 diabetes mellitus with diabetic nephropathy, with long-term current use of insulin ([facility])  Lipid Panel    TSH with Reflex    insulin lispro, 1 Unit Dial, (HUMALOG KWIKPEN) 100 UNIT/ML SOPN    insulin glargine (BASAGLAR KWIKPEN) 100 UNIT/ML injection pen      10. Dry skin  TSH with Reflex      11. Pruritic condition  TSH with Reflex      12. Wrist pain, left        Try heat and stretch. May need x-ray and PT if persists.  Plan as above and below.     Continue current Tx plan. Any changes marked below.    INSTRUCTIONS (extra copy for daughter)  NEXT APPOINTMENT: Please schedule fasting annual physical (30 minutes) in 3 months.  OK to have water and medications (except for diabetes medicines)    PLEASE TAKE THIS FORM TO CHECK-OUT WINDOW TO SCHEDULE NEXT VISIT.   PLEASE GET FASTING BLOODWORK DRAWN SOON.  Lab is on first floor in [address]. Hours Monday to Friday 6:30 AM to 4 PM AND Saturday 8-12.    Please get annual covid vaccine in 2 weeks or so.  Can get at stores such as [employer] and [employer].  Please call to schedule with diabetic pharmacist. Dorinda Lanza, PharmD [phone]  Try Eucerin lotion on skin every day. Read handout.  A1c worse than last visit.  DECREASE Basaglar to 45 units nightly.  INCREASE sliding scale before meals:   Glucose < 70

## 2024-10-24 NOTE — PATIENT INSTRUCTIONS
INSTRUCTIONS (extra copy for daughter)  NEXT APPOINTMENT: Please schedule fasting annual physical (30 minutes) in 3 months.  OK to have water and medications (except for diabetes medicines)    PLEASE TAKE THIS FORM TO CHECK-OUT WINDOW TO SCHEDULE NEXT VISIT.   PLEASE GET FASTING BLOODWORK DRAWN SOON.  Lab is on first floor in [address]. Hours Monday to Friday 6:30 AM to 4 PM AND Saturday 8-12.    Please get annual covid vaccine in 2 weeks or so.  Can get at stores such as [employer] and [employer].  Please call to schedule with diabetic pharmacist. Dorinda Lanza, PharmD [phone]  Try Eucerin lotion on skin every day. Read handout.  A1c worse than last visit.  DECREASE Basaglar to 45 units nightly.  INCREASE sliding scale before meals:   Glucose < 70  none   take 10 U  101-150 take 15 U  151-200 take 20 U  201-250 take 25 U  251-300 take 30 U  301-350 take 35 U  > 350 take 40 U  Patient Education      TOP 10 TIPS FOR RELIEVING DRY SKIN    Bathe daily. A daily bath or shower can add much-needed moisture to the skin.  To hydrate the skin with a daily bath or shower, follow these guidelines:   Keep it short. A 5- to 10-minute bath or shower adds moisture. Spend more time in the water and the skin begins to dry.   Use warm, not hot, water. Hot water removes natural oils from the skin more quickly than warm water. The more natural oils removed, the drier the skin becomes.   Close the bathroom door. This keeps the much-needed humidity in the room.   Use a mild cleanser. Deodorant bars, fragrance in soaps, and products containing alcohol strip natural oils from the skin, which dries the skin. Look for a mild, fragrance-free cleanser that moisturizes.   Gently pat the skin dry. Gently blotting the skin helps retain moisture and is less irritating to dry, sensitive skin.   Apply moisturizer within 3 minutes of getting out of the bath or shower. Contrary to popular belief, moisturizer does not add moisture to the skin.

## 2024-10-25 RX ORDER — INSULIN LISPRO 100 [IU]/ML
INJECTION, SOLUTION INTRAVENOUS; SUBCUTANEOUS
Qty: 30 ML | Refills: 2 | Status: SHIPPED | OUTPATIENT
Start: 2024-10-25

## 2024-10-25 NOTE — TELEPHONE ENCOUNTER
W[facility]ley called stating that this patients prescription of LISPRO needs to state the following:    It needs to be more specific for the meals  3X a day or however many times she needs to take this. It cannot just say before meals    And it needs to say max daily unit    This is so the insurance will cover this    [employer] can be reached at [phone]    Please Advise

## 2024-10-28 DIAGNOSIS — L29.9 PRURITIC CONDITION: ICD-10-CM

## 2024-10-28 DIAGNOSIS — Z79.4 TYPE 2 DIABETES MELLITUS WITH DIABETIC NEPHROPATHY, WITH LONG-TERM CURRENT USE OF INSULIN (HCC): ICD-10-CM

## 2024-10-28 DIAGNOSIS — E11.21 TYPE 2 DIABETES MELLITUS WITH DIABETIC NEPHROPATHY, WITH LONG-TERM CURRENT USE OF INSULIN (HCC): ICD-10-CM

## 2024-10-28 DIAGNOSIS — N18.32 STAGE 3B CHRONIC KIDNEY DISEASE (HCC): ICD-10-CM

## 2024-10-28 DIAGNOSIS — E78.2 MIXED HYPERLIPIDEMIA: ICD-10-CM

## 2024-10-28 DIAGNOSIS — L85.3 DRY SKIN: ICD-10-CM

## 2024-10-28 LAB
ALBUMIN SERPL-MCNC: 4.4 G/DL (ref 3.4–5)
ALBUMIN/GLOB SERPL: 2 {RATIO} (ref 1.1–2.2)
ALP SERPL-CCNC: 102 U/L (ref 40–129)
ALT SERPL-CCNC: 7 U/L (ref 10–40)
ANION GAP SERPL CALCULATED.3IONS-SCNC: 12 MMOL/L (ref 3–16)
AST SERPL-CCNC: 18 U/L (ref 15–37)
BILIRUB SERPL-MCNC: 0.5 MG/DL (ref 0–1)
BUN SERPL-MCNC: 31 MG/DL (ref 7–20)
CALCIUM SERPL-MCNC: 10.1 MG/DL (ref 8.3–10.6)
CHLORIDE SERPL-SCNC: 105 MMOL/L (ref 99–110)
CHOLEST SERPL-MCNC: 152 MG/DL (ref 0–199)
CO2 SERPL-SCNC: 26 MMOL/L (ref 21–32)
CREAT SERPL-MCNC: 1.8 MG/DL (ref 0.6–1.2)
GFR SERPLBLD CREATININE-BSD FMLA CKD-EPI: 27 ML/MIN/{1.73_M2}
GLUCOSE SERPL-MCNC: 136 MG/DL (ref 70–99)
HDLC SERPL-MCNC: 51 MG/DL (ref 40–60)
LDLC SERPL CALC-MCNC: 84 MG/DL
POTASSIUM SERPL-SCNC: 4.8 MMOL/L (ref 3.5–5.1)
PROT SERPL-MCNC: 6.6 G/DL (ref 6.4–8.2)
SODIUM SERPL-SCNC: 143 MMOL/L (ref 136–145)
T4 FREE SERPL-MCNC: 1.3 NG/DL (ref 0.9–1.8)
TRIGL SERPL-MCNC: 87 MG/DL (ref 0–150)
TSH SERPL DL<=0.005 MIU/L-ACNC: 5.75 UIU/ML (ref 0.27–4.2)
VLDLC SERPL CALC-MCNC: 17 MG/DL

## 2024-11-07 ENCOUNTER — PATIENT MESSAGE (OUTPATIENT)
Dept: FAMILY MEDICINE CLINIC | Age: 84
End: 2024-11-07

## 2024-11-07 DIAGNOSIS — E11.21 TYPE 2 DIABETES MELLITUS WITH DIABETIC NEPHROPATHY, WITH LONG-TERM CURRENT USE OF INSULIN (HCC): ICD-10-CM

## 2024-11-07 DIAGNOSIS — Z79.4 TYPE 2 DIABETES MELLITUS WITH DIABETIC NEPHROPATHY, WITH LONG-TERM CURRENT USE OF INSULIN (HCC): ICD-10-CM

## 2024-11-07 RX ORDER — INSULIN LISPRO 100 [IU]/ML
INJECTION, SOLUTION INTRAVENOUS; SUBCUTANEOUS
Qty: 30 ML | Refills: 2 | Status: CANCELLED | OUTPATIENT
Start: 2024-11-07

## 2024-11-07 RX ORDER — INSULIN GLARGINE 100 [IU]/ML
45 INJECTION, SOLUTION SUBCUTANEOUS NIGHTLY
Qty: 45 ML | Refills: 3 | Status: SHIPPED | OUTPATIENT
Start: 2024-11-07 | End: 2025-12-12

## 2024-11-07 RX ORDER — INSULIN GLARGINE 100 [IU]/ML
45 INJECTION, SOLUTION SUBCUTANEOUS NIGHTLY
Qty: 5 ADJUSTABLE DOSE PRE-FILLED PEN SYRINGE | Refills: 4 | Status: CANCELLED | OUTPATIENT
Start: 2024-11-07 | End: 2025-02-05

## 2024-12-17 DIAGNOSIS — K21.9 GASTROESOPHAGEAL REFLUX DISEASE WITHOUT ESOPHAGITIS: ICD-10-CM

## 2024-12-17 DIAGNOSIS — I65.21 STENOSIS OF RIGHT CAROTID ARTERY: ICD-10-CM

## 2024-12-17 DIAGNOSIS — E78.2 MIXED HYPERLIPIDEMIA: ICD-10-CM

## 2024-12-17 DIAGNOSIS — I67.9 CEREBRAL VASCULAR DISEASE: ICD-10-CM

## 2024-12-17 RX ORDER — PANTOPRAZOLE SODIUM 40 MG/1
40 TABLET, DELAYED RELEASE ORAL DAILY
Qty: 90 TABLET | Refills: 3 | Status: SHIPPED | OUTPATIENT
Start: 2024-12-17

## 2024-12-17 RX ORDER — ATORVASTATIN CALCIUM 40 MG/1
TABLET, FILM COATED ORAL
Qty: 90 TABLET | Refills: 3 | Status: SHIPPED | OUTPATIENT
Start: 2024-12-17

## 2024-12-17 RX ORDER — CLOPIDOGREL BISULFATE 75 MG/1
TABLET ORAL
Qty: 90 TABLET | Refills: 3 | Status: SHIPPED | OUTPATIENT
Start: 2024-12-17

## 2024-12-18 DIAGNOSIS — F32.5 MAJOR DEPRESSIVE DISORDER WITH SINGLE EPISODE, IN FULL REMISSION (HCC): ICD-10-CM

## 2024-12-18 RX ORDER — FLUOXETINE 40 MG/1
CAPSULE ORAL
Qty: 90 CAPSULE | Refills: 0 | Status: SHIPPED | OUTPATIENT
Start: 2024-12-18

## 2024-12-25 ENCOUNTER — TELEPHONE (OUTPATIENT)
Dept: FAMILY MEDICINE CLINIC | Age: 84
End: 2024-12-25

## 2024-12-25 DIAGNOSIS — Z79.4 TYPE 2 DIABETES MELLITUS WITH DIABETIC NEPHROPATHY, WITH LONG-TERM CURRENT USE OF INSULIN (HCC): ICD-10-CM

## 2024-12-25 DIAGNOSIS — E11.21 TYPE 2 DIABETES MELLITUS WITH DIABETIC NEPHROPATHY, WITH LONG-TERM CURRENT USE OF INSULIN (HCC): ICD-10-CM

## 2024-12-31 ENCOUNTER — TELEPHONE (OUTPATIENT)
Dept: INTERNAL MEDICINE CLINIC | Age: 84
End: 2024-12-31

## 2024-12-31 NOTE — TELEPHONE ENCOUNTER
Need a new prescription in the chart the [facility] application was not signed they are wanting a verbal   The [facility]la crowe         [phone]  [facility]

## 2025-01-01 DIAGNOSIS — E78.2 MIXED HYPERLIPIDEMIA: ICD-10-CM

## 2025-01-02 RX ORDER — BLOOD SUGAR DIAGNOSTIC
STRIP MISCELLANEOUS
Qty: 400 STRIP | Refills: 2 | Status: SHIPPED | OUTPATIENT
Start: 2025-01-02

## 2025-01-02 RX ORDER — AMLODIPINE BESYLATE 10 MG/1
10 TABLET ORAL DAILY
Qty: 100 TABLET | Refills: 1 | Status: SHIPPED | OUTPATIENT
Start: 2025-01-02

## 2025-02-05 ENCOUNTER — OFFICE VISIT (OUTPATIENT)
Dept: FAMILY MEDICINE CLINIC | Age: 85
End: 2025-02-05

## 2025-02-05 VITALS
RESPIRATION RATE: 18 BRPM | DIASTOLIC BLOOD PRESSURE: 72 MMHG | OXYGEN SATURATION: 98 % | HEART RATE: 64 BPM | BODY MASS INDEX: 35.77 KG/M2 | WEIGHT: 194.4 LBS | SYSTOLIC BLOOD PRESSURE: 130 MMHG | HEIGHT: 62 IN

## 2025-02-05 DIAGNOSIS — Z79.4 TYPE 2 DIABETES MELLITUS WITH DIABETIC NEPHROPATHY, WITH LONG-TERM CURRENT USE OF INSULIN (HCC): ICD-10-CM

## 2025-02-05 DIAGNOSIS — Z00.00 MEDICARE ANNUAL WELLNESS VISIT, SUBSEQUENT: Primary | ICD-10-CM

## 2025-02-05 DIAGNOSIS — R79.89 ABNORMAL TSH: ICD-10-CM

## 2025-02-05 DIAGNOSIS — E11.65 UNCONTROLLED TYPE 2 DIABETES MELLITUS WITH HYPERGLYCEMIA (HCC): ICD-10-CM

## 2025-02-05 DIAGNOSIS — E11.21 TYPE 2 DIABETES MELLITUS WITH DIABETIC NEPHROPATHY, WITH LONG-TERM CURRENT USE OF INSULIN (HCC): ICD-10-CM

## 2025-02-05 DIAGNOSIS — R79.89 HIGH SERUM THYROID STIMULATING HORMONE (TSH): ICD-10-CM

## 2025-02-05 DIAGNOSIS — N18.32 STAGE 3B CHRONIC KIDNEY DISEASE (HCC): ICD-10-CM

## 2025-02-05 DIAGNOSIS — E66.01 MORBID (SEVERE) OBESITY DUE TO EXCESS CALORIES: ICD-10-CM

## 2025-02-05 LAB
ANION GAP SERPL CALCULATED.3IONS-SCNC: 9 MMOL/L (ref 3–16)
BASOPHILS # BLD: 0 K/UL (ref 0–0.2)
BASOPHILS NFR BLD: 0.6 %
BUN SERPL-MCNC: 26 MG/DL (ref 7–20)
CALCIUM SERPL-MCNC: 9.7 MG/DL (ref 8.3–10.6)
CHLORIDE SERPL-SCNC: 104 MMOL/L (ref 99–110)
CO2 SERPL-SCNC: 27 MMOL/L (ref 21–32)
CREAT SERPL-MCNC: 1.5 MG/DL (ref 0.6–1.2)
CREAT UR-MCNC: 120 MG/DL (ref 28–259)
DEPRECATED RDW RBC AUTO: 17.9 % (ref 12.4–15.4)
EOSINOPHIL # BLD: 0.2 K/UL (ref 0–0.6)
EOSINOPHIL NFR BLD: 3.4 %
GFR SERPLBLD CREATININE-BSD FMLA CKD-EPI: 34 ML/MIN/{1.73_M2}
GLUCOSE SERPL-MCNC: 107 MG/DL (ref 70–99)
HBA1C MFR BLD: 8.7 %
HCT VFR BLD AUTO: 41.3 % (ref 36–48)
HGB BLD-MCNC: 13.5 G/DL (ref 12–16)
LYMPHOCYTES # BLD: 2.1 K/UL (ref 1–5.1)
LYMPHOCYTES NFR BLD: 35 %
MCH RBC QN AUTO: 28.6 PG (ref 26–34)
MCHC RBC AUTO-ENTMCNC: 32.7 G/DL (ref 31–36)
MCV RBC AUTO: 87.5 FL (ref 80–100)
MICROALBUMIN UR DL<=1MG/L-MCNC: 31.5 MG/DL
MICROALBUMIN/CREAT UR: 262.5 MG/G (ref 0–30)
MONOCYTES # BLD: 0.6 K/UL (ref 0–1.3)
MONOCYTES NFR BLD: 9.6 %
NEUTROPHILS # BLD: 3.1 K/UL (ref 1.7–7.7)
NEUTROPHILS NFR BLD: 51.4 %
PLATELET # BLD AUTO: 224 K/UL (ref 135–450)
PMV BLD AUTO: 10.1 FL (ref 5–10.5)
POTASSIUM SERPL-SCNC: 4.4 MMOL/L (ref 3.5–5.1)
RBC # BLD AUTO: 4.71 M/UL (ref 4–5.2)
SODIUM SERPL-SCNC: 140 MMOL/L (ref 136–145)
TSH SERPL DL<=0.005 MIU/L-ACNC: 3.56 UIU/ML (ref 0.27–4.2)
WBC # BLD AUTO: 5.9 K/UL (ref 4–11)

## 2025-02-05 SDOH — ECONOMIC STABILITY: FOOD INSECURITY: WITHIN THE PAST 12 MONTHS, YOU WORRIED THAT YOUR FOOD WOULD RUN OUT BEFORE YOU GOT MONEY TO BUY MORE.: NEVER TRUE

## 2025-02-05 SDOH — ECONOMIC STABILITY: FOOD INSECURITY: WITHIN THE PAST 12 MONTHS, THE FOOD YOU BOUGHT JUST DIDN'T LAST AND YOU DIDN'T HAVE MONEY TO GET MORE.: NEVER TRUE

## 2025-02-05 SDOH — ECONOMIC STABILITY: INCOME INSECURITY: IN THE LAST 12 MONTHS, WAS THERE A TIME WHEN YOU WERE NOT ABLE TO PAY THE MORTGAGE OR RENT ON TIME?: NO

## 2025-02-05 SDOH — ECONOMIC STABILITY: TRANSPORTATION INSECURITY
IN THE PAST 12 MONTHS, HAS THE LACK OF TRANSPORTATION KEPT YOU FROM MEDICAL APPOINTMENTS OR FROM GETTING MEDICATIONS?: NO

## 2025-02-05 SDOH — HEALTH STABILITY: PHYSICAL HEALTH: ON AVERAGE, HOW MANY DAYS PER WEEK DO YOU ENGAGE IN MODERATE TO STRENUOUS EXERCISE (LIKE A BRISK WALK)?: 0 DAYS

## 2025-02-05 ASSESSMENT — PATIENT HEALTH QUESTIONNAIRE - PHQ9
SUM OF ALL RESPONSES TO PHQ QUESTIONS 1-9: 1
9. THOUGHTS THAT YOU WOULD BE BETTER OFF DEAD, OR OF HURTING YOURSELF: NOT AT ALL
SUM OF ALL RESPONSES TO PHQ QUESTIONS 1-9: 1
SUM OF ALL RESPONSES TO PHQ9 QUESTIONS 1 & 2: 0
3. TROUBLE FALLING OR STAYING ASLEEP: NOT AT ALL
1. LITTLE INTEREST OR PLEASURE IN DOING THINGS: NOT AT ALL
7. TROUBLE CONCENTRATING ON THINGS, SUCH AS READING THE NEWSPAPER OR WATCHING TELEVISION: NOT AT ALL
8. MOVING OR SPEAKING SO SLOWLY THAT OTHER PEOPLE COULD HAVE NOTICED. OR THE OPPOSITE, BEING SO FIGETY OR RESTLESS THAT YOU HAVE BEEN MOVING AROUND A LOT MORE THAN USUAL: NOT AT ALL
2. FEELING DOWN, DEPRESSED OR HOPELESS: NOT AT ALL
4. FEELING TIRED OR HAVING LITTLE ENERGY: NOT AT ALL
SUM OF ALL RESPONSES TO PHQ QUESTIONS 1-9: 1
SUM OF ALL RESPONSES TO PHQ QUESTIONS 1-9: 1
5. POOR APPETITE OR OVEREATING: SEVERAL DAYS
6. FEELING BAD ABOUT YOURSELF - OR THAT YOU ARE A FAILURE OR HAVE LET YOURSELF OR YOUR FAMILY DOWN: NOT AT ALL
10. IF YOU CHECKED OFF ANY PROBLEMS, HOW DIFFICULT HAVE THESE PROBLEMS MADE IT FOR YOU TO DO YOUR WORK, TAKE CARE OF THINGS AT HOME, OR GET ALONG WITH OTHER PEOPLE: NOT DIFFICULT AT ALL

## 2025-02-05 ASSESSMENT — LIFESTYLE VARIABLES
HOW OFTEN DO YOU HAVE A DRINK CONTAINING ALCOHOL: NEVER
HOW OFTEN DO YOU HAVE SIX OR MORE DRINKS ON ONE OCCASION: 1
HOW MANY STANDARD DRINKS CONTAINING ALCOHOL DO YOU HAVE ON A TYPICAL DAY: 0
HOW MANY STANDARD DRINKS CONTAINING ALCOHOL DO YOU HAVE ON A TYPICAL DAY: PATIENT DOES NOT DRINK
HOW OFTEN DO YOU HAVE A DRINK CONTAINING ALCOHOL: 1

## 2025-02-05 NOTE — PATIENT INSTRUCTIONS
Patient Education        Learning About Emotional Support  When do you need emotional support?     You might find getting support from others helpful when you have a long-term health problem. Often people feel alone, confused, or scared when coping with an illness. But you aren't alone. Other people are going through the same thing you are and know how you feel.  Talking with others about your feelings can help you feel better.  Your family and friends can give you support. So can your doctor, a support group, or a Confucianism. If you have a support network, you will not feel as alone. You will learn new ways to deal with your situation, and you may try harder to overcome it.  Where you can get support  Family and friends: They can help you cope by giving you comfort and encouragement.  Counseling: Professional counseling can help you cope with situations that interfere with your life and cause stress. Counseling can help you understand and deal with your illness.  Your doctor: Find a doctor you trust and feel comfortable with. Be open and honest about your fears and concerns. Your doctor can help you get the right medical treatments, including counseling.  Spiritual or Buddhist groups: They can provide comfort and may be able to help you find counseling or other social support services.  Social groups: They can help you meet new people and get involved in activities you enjoy.  Community support groups: In a support group, you can talk to others who have dealt with the same problems or illness as you. You can encourage one another and learn ways to cope with tough emotions.  How can you find a support group?  Finding a support group that works for you may take time. There are many options. Some groups have a  who helps lead discussions or shares information. Others are less formal. Some meet in person, while others meet online.  Try using these resources to help you find the best support group for

## 2025-02-05 NOTE — PROGRESS NOTES
Medicare Annual Wellness Visit     Assessment and Plan:      Diagnosis Orders   1. Medicare annual wellness visit, subsequent        2. Type 2 diabetes mellitus with diabetic nephropathy, with long-term current use of insulin (HCC)  POCT glycosylated hemoglobin (Hb A1C)     DIABETES EYE EXAM    TSH reflex to FT4    Basic Metabolic Panel    Albumin/Creatinine Ratio, Urine      3. High serum thyroid stimulating hormone (TSH)  TSH reflex to FT4      4. Stage 3b chronic kidney disease (HCC)  Basic Metabolic Panel    CBC with Auto Differential      5. Uncontrolled type 2 diabetes mellitus with hyperglycemia (HCC)  Increase insulin      6. Morbid (severe) obesity due to excess calories (E66.01)        7. Abnormal TSH  TSH reflex to FT4        Plan as above and below.    FYI: While Medicare provides you with a FREE ANNUAL PREVENTIVE PHYSICAL, this visit does NOT include management of chronic medical problems or physical examination.  Dr. Lofton usually does a combination visit if you have other medical problems so you don't have to come back for another visit.  However, this means that there will be a co-pay.    INSTRUCTIONS  NEXT APPOINTMENT: Please schedule check-up in 3 months     PLEASE TAKE THIS FORM TO CHECK-OUT WINDOW TO SCHEDULE NEXT VISIT.  PLEASE GET BLOODWORK DRAWN TODAY and urine sample ON FIRST FLOOR in 170.  Take orders with you.   Please get annual dilated eye exam to screen for diabetic retinopathy which can lead to vision loss.  Ask for report to be faxed to [phone].   Look into making living will and medical POA. Info included in back of this packet. We would like a notarized copy for our records.  Medicare part D patients:  Get Shingrix shingles vaccine at pharmacy (such as [facility] or [facility]). Need second dose in 2-6 months. Medicare starts covering it in 2023.   Due for tetanus booster which prevents lockjaw from injuries.  Medicare only covers for injury, so call to be seen for tetanus booster if

## 2025-02-16 DIAGNOSIS — E11.21 TYPE 2 DIABETES MELLITUS WITH DIABETIC NEPHROPATHY, WITH LONG-TERM CURRENT USE OF INSULIN (HCC): ICD-10-CM

## 2025-02-16 DIAGNOSIS — Z79.4 TYPE 2 DIABETES MELLITUS WITH DIABETIC NEPHROPATHY, WITH LONG-TERM CURRENT USE OF INSULIN (HCC): ICD-10-CM

## 2025-02-17 RX ORDER — INSULIN LISPRO 100 [IU]/ML
INJECTION, SOLUTION INTRAVENOUS; SUBCUTANEOUS
Qty: 105 ML | Refills: 0 | Status: SHIPPED | OUTPATIENT
Start: 2025-02-17

## 2025-02-21 DIAGNOSIS — Z79.4 TYPE 2 DIABETES MELLITUS WITH DIABETIC NEPHROPATHY, WITH LONG-TERM CURRENT USE OF INSULIN (HCC): ICD-10-CM

## 2025-02-21 DIAGNOSIS — E11.21 TYPE 2 DIABETES MELLITUS WITH DIABETIC NEPHROPATHY, WITH LONG-TERM CURRENT USE OF INSULIN (HCC): ICD-10-CM

## 2025-05-06 DIAGNOSIS — F32.5 MAJOR DEPRESSIVE DISORDER WITH SINGLE EPISODE, IN FULL REMISSION: ICD-10-CM

## 2025-05-06 RX ORDER — FLUOXETINE HYDROCHLORIDE 40 MG/1
40 CAPSULE ORAL DAILY
Qty: 90 CAPSULE | Refills: 3 | Status: SHIPPED | OUTPATIENT
Start: 2025-05-06

## 2025-05-13 DIAGNOSIS — Z79.4 TYPE 2 DIABETES MELLITUS WITH DIABETIC NEPHROPATHY, WITH LONG-TERM CURRENT USE OF INSULIN (HCC): ICD-10-CM

## 2025-05-13 DIAGNOSIS — E11.21 TYPE 2 DIABETES MELLITUS WITH DIABETIC NEPHROPATHY, WITH LONG-TERM CURRENT USE OF INSULIN (HCC): ICD-10-CM

## 2025-05-13 RX ORDER — SITAGLIPTIN 25 MG/1
25 TABLET, FILM COATED ORAL DAILY
Qty: 100 TABLET | Refills: 2 | Status: SHIPPED | OUTPATIENT
Start: 2025-05-13

## 2025-05-24 DIAGNOSIS — E78.2 MIXED HYPERLIPIDEMIA: ICD-10-CM

## 2025-05-27 RX ORDER — AMLODIPINE BESYLATE 10 MG/1
10 TABLET ORAL DAILY
Qty: 100 TABLET | Refills: 2 | Status: SHIPPED | OUTPATIENT
Start: 2025-05-27

## 2025-07-08 ENCOUNTER — TELEPHONE (OUTPATIENT)
Dept: FAMILY MEDICINE CLINIC | Age: 85
End: 2025-07-08

## 2025-07-08 ENCOUNTER — OFFICE VISIT (OUTPATIENT)
Dept: FAMILY MEDICINE CLINIC | Age: 85
End: 2025-07-08

## 2025-07-08 ENCOUNTER — CARE COORDINATION (OUTPATIENT)
Dept: CARE COORDINATION | Age: 85
End: 2025-07-08

## 2025-07-08 VITALS
DIASTOLIC BLOOD PRESSURE: 62 MMHG | WEIGHT: 190 LBS | BODY MASS INDEX: 34.96 KG/M2 | HEART RATE: 74 BPM | SYSTOLIC BLOOD PRESSURE: 134 MMHG | OXYGEN SATURATION: 95 % | HEIGHT: 62 IN

## 2025-07-08 DIAGNOSIS — R94.6 ABNORMAL RESULTS OF THYROID FUNCTION STUDIES: ICD-10-CM

## 2025-07-08 DIAGNOSIS — I10 ESSENTIAL HYPERTENSION: ICD-10-CM

## 2025-07-08 DIAGNOSIS — E11.21 TYPE 2 DIABETES MELLITUS WITH DIABETIC NEPHROPATHY, WITH LONG-TERM CURRENT USE OF INSULIN (HCC): Primary | ICD-10-CM

## 2025-07-08 DIAGNOSIS — N18.32 STAGE 3B CHRONIC KIDNEY DISEASE (HCC): ICD-10-CM

## 2025-07-08 DIAGNOSIS — F32.5 MAJOR DEPRESSIVE DISORDER WITH SINGLE EPISODE, IN FULL REMISSION: ICD-10-CM

## 2025-07-08 DIAGNOSIS — E78.2 MIXED HYPERLIPIDEMIA: ICD-10-CM

## 2025-07-08 DIAGNOSIS — R79.89 ABNORMAL TSH: ICD-10-CM

## 2025-07-08 DIAGNOSIS — Z79.4 TYPE 2 DIABETES MELLITUS WITH DIABETIC NEPHROPATHY, WITH LONG-TERM CURRENT USE OF INSULIN (HCC): Primary | ICD-10-CM

## 2025-07-08 LAB — HBA1C MFR BLD: 8.1 %

## 2025-07-08 RX ORDER — FUROSEMIDE 40 MG/1
40 TABLET ORAL 2 TIMES DAILY
Qty: 180 TABLET | Refills: 0 | Status: SHIPPED | OUTPATIENT
Start: 2025-07-08

## 2025-07-08 NOTE — CARE COORDINATION
Ambulatory Care Coordination Note     7/8/2025 3:30 PM     Patient outreach attempt by this ACM today to offer care management services. ACM was unable to reach the patient by telephone today;   left voice message requesting a return phone call to this ACM.     ACM: Erendira Carey RN     Care Summary Note: ACM received provider referral for CC and RPM, for HTN management.     PCP/Specialist follow up:   Future Appointments         Provider Specialty Dept Phone    11/12/2025 10:40 AM Jim Lofton MD Family Medicine [phone]            Follow Up:   Plan for next ACM outreach in approximately 1-2 days  to complete:  - outreach attempt to offer care management services.

## 2025-07-08 NOTE — PATIENT INSTRUCTIONS
INSTRUCTIONS  NEXT APPOINTMENT: Please schedule check-up in 4 months    PLEASE TAKE THIS FORM TO CHECK-OUT WINDOW TO SCHEDULE NEXT VISIT.   PLEASE GET BLOODWORK DRAWN TODAY ON FIRST FLOOR in 170.  Take orders with you.  RESULTS- most blood tests back in couple days.  We will call you if any problems.  If bloodwork good, you will get letter in mail or notified thru MyChart (if signed up) within 2 weeks.  If you do not, please call office.   Please get annual flu and covid vaccine when available in fall.  Can get at stores such as [employer] and [employer].  Danika Carey will call you about remote monitoring of blood pressure. They will mail you the kit.  A1c good at 8.1

## 2025-07-08 NOTE — PROGRESS NOTES
CHRONIC CONDITION FOLLOW-UP     Assessment and Plan:      Diagnosis Orders   1. Type 2 diabetes mellitus with diabetic nephropathy, with long-term current use of insulin (HCC)  POCT glycosylated hemoglobin (Hb A1C)    Continuous Glucose Sensor (FREESTYLE AVELINA 2 SENSOR) [facility] [place]      2. Major depressive disorder with single episode, in full remission        3. Stage 3b chronic kidney disease (HCC)  Basic Metabolic Panel      4. Mixed hyperlipidemia        5. Essential hypertension  furosemide (LASIX) 40 MG tablet    Basic Metabolic Panel      6. Abnormal TSH  TSH reflex to FT4      7. Abnormal results of thyroid function studies  TSH reflex to FT4      Continue current Tx plan. Any changes marked below.  Pt is using Continuous Glucose Monitor and benefiting from hypoglycemia alerts and better information for controlling sugars.      INSTRUCTIONS  NEXT APPOINTMENT: Please schedule check-up in 4 months    PLEASE TAKE THIS FORM TO CHECK-OUT WINDOW TO SCHEDULE NEXT VISIT.   PLEASE GET BLOODWORK DRAWN TODAY ON FIRST FLOOR in 170.  Take orders with you.  RESULTS- most blood tests back in couple days.  We will call you if any problems.  If bloodwork good, you will get letter in mail or notified thru MyChart (if signed up) within 2 weeks.  If you do not, please call office.   Please get annual flu and covid vaccine when available in fall.  Can get at stores such as [employer] and [employer].  Danika Carey will call you about remote monitoring of blood pressure. They will mail you the kit.  A1c good at 8.1    Subjective:      Chief Complaint   Patient presents with    Diabetes     5 mo f/u DM     Vandana Pickard is an 85 y.o. female who presents for follow up    Complaints:   Rare low sugars.  Feeling good.    CHART REVIEW  Health Maintenance Due   Topic Date Due    Respiratory Syncytial Virus (RSV) Pregnant or age 60 yrs+ (1 - 1-dose 75+ series) Never done    Shingles vaccine (2 of 3) 02/06/2017    DTaP/Tdap/Td vaccine (3 - Td or

## 2025-07-09 ENCOUNTER — CARE COORDINATION (OUTPATIENT)
Dept: CARE COORDINATION | Age: 85
End: 2025-07-09

## 2025-07-09 LAB
ANION GAP SERPL CALCULATED.3IONS-SCNC: 14 MMOL/L (ref 3–16)
BUN SERPL-MCNC: 31 MG/DL (ref 7–20)
CALCIUM SERPL-MCNC: 10 MG/DL (ref 8.3–10.6)
CHLORIDE SERPL-SCNC: 105 MMOL/L (ref 99–110)
CO2 SERPL-SCNC: 24 MMOL/L (ref 21–32)
CREAT SERPL-MCNC: 1.5 MG/DL (ref 0.6–1.2)
GFR SERPLBLD CREATININE-BSD FMLA CKD-EPI: 34 ML/MIN/{1.73_M2}
GLUCOSE SERPL-MCNC: 101 MG/DL (ref 70–99)
POTASSIUM SERPL-SCNC: 4.4 MMOL/L (ref 3.5–5.1)
SODIUM SERPL-SCNC: 143 MMOL/L (ref 136–145)
TSH SERPL DL<=0.005 MIU/L-ACNC: 3.35 UIU/ML (ref 0.27–4.2)

## 2025-07-09 NOTE — CARE COORDINATION
Ambulatory Care Coordination Note     7/9/2025 1:37 PM     Patient outreach attempt by this ACM today to offer care management services. ACM was unable to reach the patient by telephone today;   left voice message requesting a return phone call to this ACM.     ACM: Erendira Carey RN     Care Summary Note: UTRx2, Provider referral for CC and RPM- HTN management. [employer] message sent on this date as well.     PCP/Specialist follow up:   Future Appointments         Provider Specialty Dept Phone    11/12/2025 10:40 AM Jim Lofton MD Family Medicine [phone]            Follow Up:   Plan for next ACM outreach in approximately 1-2 days  to complete:  - outreach attempt to offer care management services.

## 2025-07-10 DIAGNOSIS — Z79.4 TYPE 2 DIABETES MELLITUS WITH DIABETIC NEPHROPATHY, WITH LONG-TERM CURRENT USE OF INSULIN (HCC): ICD-10-CM

## 2025-07-10 DIAGNOSIS — E11.21 TYPE 2 DIABETES MELLITUS WITH DIABETIC NEPHROPATHY, WITH LONG-TERM CURRENT USE OF INSULIN (HCC): ICD-10-CM

## 2025-07-10 RX ORDER — INSULIN LISPRO 100 [IU]/ML
INJECTION, SOLUTION INTRAVENOUS; SUBCUTANEOUS
Qty: 105 ML | Refills: 0 | Status: SHIPPED | OUTPATIENT
Start: 2025-07-10

## 2025-07-10 RX ORDER — INSULIN GLARGINE 100 [IU]/ML
45 INJECTION, SOLUTION SUBCUTANEOUS NIGHTLY
Qty: 45 ML | Refills: 3 | Status: SHIPPED | OUTPATIENT
Start: 2025-07-10 | End: 2026-08-14

## 2025-07-11 ENCOUNTER — CARE COORDINATION (OUTPATIENT)
Dept: CARE COORDINATION | Age: 85
End: 2025-07-11

## 2025-07-11 NOTE — CARE COORDINATION
Ambulatory Care Coordination Note     7/11/2025 12:04 PM     patient outreach attempt by this AC today to offer care management services. ACM was unable to reach the patient by telephone today;   left voice message requesting a return phone call to this ACM.     Patient closed (unable to reach patient) from the High Risk Care Management program on 7/11/2025.

## 2025-08-21 ENCOUNTER — PATIENT MESSAGE (OUTPATIENT)
Dept: FAMILY MEDICINE CLINIC | Age: 85
End: 2025-08-21

## 2025-08-21 DIAGNOSIS — E11.21 TYPE 2 DIABETES MELLITUS WITH DIABETIC NEPHROPATHY, WITH LONG-TERM CURRENT USE OF INSULIN (HCC): Primary | ICD-10-CM

## 2025-08-21 DIAGNOSIS — Z79.4 TYPE 2 DIABETES MELLITUS WITH DIABETIC NEPHROPATHY, WITH LONG-TERM CURRENT USE OF INSULIN (HCC): Primary | ICD-10-CM

## 2025-08-21 RX ORDER — HYDROCHLOROTHIAZIDE 12.5 MG/1
1 CAPSULE ORAL
Qty: 6 EACH | Refills: 3 | Status: SHIPPED | OUTPATIENT
Start: 2025-08-21

## 2025-09-01 DIAGNOSIS — I10 ESSENTIAL HYPERTENSION: ICD-10-CM

## 2025-09-03 RX ORDER — FUROSEMIDE 40 MG/1
40 TABLET ORAL 2 TIMES DAILY
Qty: 180 TABLET | Refills: 1 | Status: SHIPPED | OUTPATIENT
Start: 2025-09-03